# Patient Record
Sex: FEMALE | Race: WHITE | NOT HISPANIC OR LATINO | Employment: OTHER | URBAN - METROPOLITAN AREA
[De-identification: names, ages, dates, MRNs, and addresses within clinical notes are randomized per-mention and may not be internally consistent; named-entity substitution may affect disease eponyms.]

---

## 2017-12-12 NOTE — H&P
[facility]  TOTAL EYE CARE  [address]  [facility] [address]  [phone]  Dept: [phone]    OPHTHALMOLOGY PRE-OPERATIVE  HISTORY AND PHYSICAL    DATE OF H/P:  2017    DATE OF SURGERY:  2017  PROCEDURE:  Procedure(s):  PHACOEMULSIFICATION WITH STANDARD INTRAOCULAR LENS IMPLANT, Left Eye  LENS IMPLANT:  ZCB00 +24.0  REFRACTIVE GOAL:  PL Sph  SURGEON:  James Melendez MD    ANESTHESIA:  TOPICAL / MAC    OR CASE REQUIREMENTS:    DEMOGRAPHICS:  Demographic Information on Vy Dooley:    Vy Dooley  Gender: female  : 1950  [address] [facility] [address]  [phone] (home)     Medical Record: [id number]  Social Security Number: [id number]  Pharmacy: [facility] [address] [place] [address]  Primary Care Provider: Yael Calvin    Parent's names are: Data Unavailable (mother) and Data Unavailable (father).    Insurance: Payor: MEDICARE / Plan: MEDICARE FOR HB SUPPLEMENT / Product Type: Medicare /     OCULAR HISTORY:  Cataracts.    HISTORIES:  Past Medical History:   Diagnosis Date     COPD (chronic obstructive pulmonary disease) (H)        Past Surgical History:   Procedure Laterality Date     COLONOSCOPY  2011    Procedure:COLONOSCOPY; Colonoscopy  ; Surgeon:JACOB SCHULTZ; Location:[facility]       Family History   Problem Relation Age of Onset     Colon Cancer Father      LUNG DISEASE Mother      DIABETES Sister      DIABETES Brother        Social History   Substance Use Topics     Smoking status: Former Smoker     Packs/day: 2.00     Types: Cigarettes     Quit date: 2015     Smokeless tobacco: Not on file     Alcohol use 0.0 oz/week     0 Standard drinks or equivalent per week      Comment: rare       MEDICATIONS:  No current facility-administered medications for this encounter.      Current Outpatient Prescriptions   Medication Sig     Ipratropium-Albuterol (COMBIVENT RESPIMAT)  MCG/ACT inhaler Inhale  1 puff into the lungs 4 times daily     ESCITALOPRAM OXALATE PO      order for DME Oxygen 2 Li/min  continous     albuterol (PROAIR HFA, PROVENTIL HFA, VENTOLIN HFA) 108 (90 BASE) MCG/ACT inhaler Inhale 2 puffs into the lungs every 6 hours     omeprazole (PRILOSEC) 40 MG capsule Take by mouth daily     Cholecalciferol (VITAMIN D3) 2000 UNITS TABS      Pregabalin (LYRICA PO) Take 150 mg by mouth daily     Zolpidem Tartrate (AMBIEN PO) Take 5 mg by mouth nightly as needed for sleep     ALBUTEROL IN Inhale  into the lungs.     Fluticasone-Salmeterol (ADVAIR DISKUS IN) Inhale  into the lungs.     ranitidine (ZANTAC) 150 MG tablet Take 150 mg by mouth 2 times daily.       ALLERGIES:     Allergies   Allergen Reactions     Erythromycin Shortness Of Breath     Amoxicillin-Pot Clavulanate Itching       PERTINENT SYSTEMS REVIEW:    1. No - Do you have a history of heart attack, stroke, stent, bypass or surgery on an artery in the head, neck, heart or legs?  2. No - Do you ever have any pain or discomfort in your chest?  3. No - Do you have a history of  Heart Failure?  4. Yes: COPD - home oxygen - Are you troubled by shortness of breath when walking: On the level, up a slight hill or at night?  5. No - Do you currently have a cold, bronchitis or other respiratory infection?  6. Yes: COPD, home oxygen - Do you have a cough, shortness of breath or wheezing?  7. No - Do you sometimes get pains in the calves of your legs when you walk?  8. No - Do you or anyone in your family have previous history of blood clots?  9. No - Do you or does anyone in your family have a serious bleeding problem such as prolonged bleeding following surgeries or cuts?  10. No - Have you ever had problems with anemia or been told to take iron pills?  11. No - Have you had any abnormal blood loss such as black, tarry or bloody stools, or abnormal vaginal bleeding?  12. No - Have you ever had a blood transfusion?  13. No - Have you or any of your relatives  ever had problems with anesthesia?  14. No - Do you have sleep apnea, excessive snoring or daytime drowsiness?  15. No - Do you have any prosthetic heart valves?  16. No - Do you have prosthetic joints?    EXAMINATION:  Vitals were reviewed                       Vison:  Va, right - 20/160; left - 20/160  HEENT:  Cataract, otherwise unremarkable.  LUNGS:  Clear  CV:  Regular rate and rhythm without murmur  ABD:  Soft and nontender  NEURO:  Alert and nonfocal    IMPRESSION:  Patient cleared for ophthalmic surgery.  Low risk with monitored, light sedation.  I have assessed the patient's DVT risk, and no additional orders necessary.    PLAN:  Procedure(s):  PHACOEMULSIFICATION WITH STANDARD INTRAOCULAR LENS IMPLANT, Left Eye      James Melendez MD

## 2017-12-15 ENCOUNTER — ANESTHESIA EVENT (OUTPATIENT)
Dept: SURGERY | Facility: CLINIC | Age: 67
End: 2017-12-15
Payer: MEDICARE

## 2017-12-15 ASSESSMENT — COPD QUESTIONNAIRES: COPD: 1

## 2017-12-15 ASSESSMENT — LIFESTYLE VARIABLES: TOBACCO_USE: 1

## 2017-12-15 NOTE — ANESTHESIA PREPROCEDURE EVALUATION
Anesthesia Evaluation     .             ROS/MED HX    ENT/Pulmonary:     (+)tobacco use, Past use asthma moderate COPD, , . .    Neurologic:       Cardiovascular:         METS/Exercise Tolerance:     Hematologic:         Musculoskeletal:         GI/Hepatic:     (+) GERD       Renal/Genitourinary:         Endo:         Psychiatric:     (+) psychiatric history depression      Infectious Disease:         Malignancy:         Other:                     Physical Exam  Normal systems: cardiovascular, pulmonary and dental    Airway   Mallampati: II  TM distance: >3 FB  Neck ROM: full    Dental     Cardiovascular       Pulmonary                     Anesthesia Plan      History & Physical Review  History and physical reviewed and following examination; no interval change.    ASA Status:  3 .    NPO Status:  > 6 hours    Plan for MAC Reason for MAC:  Deep or markedly invasive procedure (G8)         Postoperative Care      Consents  Anesthetic plan, risks, benefits and alternatives discussed with:  Patient..                          .

## 2017-12-18 ENCOUNTER — SURGERY (OUTPATIENT)
Age: 67
End: 2017-12-18

## 2017-12-18 ENCOUNTER — HOSPITAL ENCOUNTER (OUTPATIENT)
Facility: CLINIC | Age: 67
Discharge: HOME | End: 2017-12-18
Attending: OPHTHALMOLOGY | Admitting: OPHTHALMOLOGY
Payer: MEDICARE

## 2017-12-18 ENCOUNTER — ANESTHESIA (OUTPATIENT)
Dept: SURGERY | Facility: CLINIC | Age: 67
End: 2017-12-18
Payer: MEDICARE

## 2017-12-18 VITALS
HEIGHT: 70 IN | RESPIRATION RATE: 16 BRPM | HEART RATE: 100 BPM | OXYGEN SATURATION: 93 % | BODY MASS INDEX: 24.34 KG/M2 | DIASTOLIC BLOOD PRESSURE: 67 MMHG | TEMPERATURE: 98.2 F | WEIGHT: 170 LBS | SYSTOLIC BLOOD PRESSURE: 123 MMHG

## 2017-12-18 PROCEDURE — 25000125 ZZHC RX 250: Performed by: NURSE ANESTHETIST, CERTIFIED REGISTERED

## 2017-12-18 PROCEDURE — 25000125 ZZHC RX 250: Performed by: OPHTHALMOLOGY

## 2017-12-18 PROCEDURE — 37000012 ZZH ANESTHESIA CATARACT PACKAGE: Performed by: OPHTHALMOLOGY

## 2017-12-18 PROCEDURE — 25000128 H RX IP 250 OP 636: Performed by: NURSE ANESTHETIST, CERTIFIED REGISTERED

## 2017-12-18 PROCEDURE — 71000022 ZZH RECOVERY CATRACT PACKAGE: Performed by: OPHTHALMOLOGY

## 2017-12-18 PROCEDURE — 36000025 ZZH CATARACT SURGICAL PACKAGE: Performed by: OPHTHALMOLOGY

## 2017-12-18 PROCEDURE — 25000128 H RX IP 250 OP 636: Performed by: OPHTHALMOLOGY

## 2017-12-18 PROCEDURE — V2632 POST CHMBR INTRAOCULAR LENS: HCPCS | Performed by: OPHTHALMOLOGY

## 2017-12-18 DEVICE — EYE IMP IOL AMO PCL TECNIS ZCB00 24.0: Type: IMPLANTABLE DEVICE | Site: EYE | Status: FUNCTIONAL

## 2017-12-18 RX ORDER — CYCLOPENTOLATE HYDROCHLORIDE 10 MG/ML
1 SOLUTION/ DROPS OPHTHALMIC
Status: COMPLETED | OUTPATIENT
Start: 2017-12-18 | End: 2017-12-18

## 2017-12-18 RX ORDER — TROPICAMIDE 10 MG/ML
1 SOLUTION/ DROPS OPHTHALMIC
Status: COMPLETED | OUTPATIENT
Start: 2017-12-18 | End: 2017-12-18

## 2017-12-18 RX ORDER — SODIUM CHLORIDE, SODIUM LACTATE, POTASSIUM CHLORIDE, CALCIUM CHLORIDE 600; 310; 30; 20 MG/100ML; MG/100ML; MG/100ML; MG/100ML
INJECTION, SOLUTION INTRAVENOUS CONTINUOUS
Status: DISCONTINUED | OUTPATIENT
Start: 2017-12-18 | End: 2017-12-18 | Stop reason: HOSPADM

## 2017-12-18 RX ORDER — LIDOCAINE 40 MG/G
CREAM TOPICAL
Status: DISCONTINUED | OUTPATIENT
Start: 2017-12-18 | End: 2017-12-18 | Stop reason: HOSPADM

## 2017-12-18 RX ORDER — DIPHENOXYLATE HYDROCHLORIDE AND ATROPINE SULFATE 2.5; .025 MG/1; MG/1
1 TABLET ORAL
COMMUNITY
Start: 2016-11-03

## 2017-12-18 RX ORDER — PHENYLEPHRINE HYDROCHLORIDE 25 MG/ML
1 SOLUTION/ DROPS OPHTHALMIC
Status: COMPLETED | OUTPATIENT
Start: 2017-12-18 | End: 2017-12-18

## 2017-12-18 RX ADMIN — SODIUM CHLORIDE, POTASSIUM CHLORIDE, SODIUM LACTATE AND CALCIUM CHLORIDE: 600; 310; 30; 20 INJECTION, SOLUTION INTRAVENOUS at 12:53

## 2017-12-18 RX ADMIN — TROPICAMIDE 1 DROP: 10 SOLUTION/ DROPS OPHTHALMIC at 12:53

## 2017-12-18 RX ADMIN — TROPICAMIDE 1 DROP: 10 SOLUTION/ DROPS OPHTHALMIC at 13:03

## 2017-12-18 RX ADMIN — PHENYLEPHRINE HYDROCHLORIDE 1 DROP: 25 SOLUTION/ DROPS OPHTHALMIC at 12:47

## 2017-12-18 RX ADMIN — MIDAZOLAM 2 MG: 1 INJECTION INTRAMUSCULAR; INTRAVENOUS at 13:32

## 2017-12-18 RX ADMIN — SODIUM CHLORIDE, POTASSIUM CHLORIDE, SODIUM LACTATE AND CALCIUM CHLORIDE: 600; 310; 30; 20 INJECTION, SOLUTION INTRAVENOUS at 13:32

## 2017-12-18 RX ADMIN — PHENYLEPHRINE HYDROCHLORIDE 1 DROP: 25 SOLUTION/ DROPS OPHTHALMIC at 12:54

## 2017-12-18 RX ADMIN — CYCLOPENTOLATE HYDROCHLORIDE 1 DROP: 10 SOLUTION OPHTHALMIC at 13:03

## 2017-12-18 RX ADMIN — TROPICAMIDE 1 DROP: 10 SOLUTION/ DROPS OPHTHALMIC at 12:46

## 2017-12-18 RX ADMIN — EPINEPHRINE 0.8 ML: 1 INJECTION, SOLUTION INTRAMUSCULAR; SUBCUTANEOUS at 13:46

## 2017-12-18 RX ADMIN — CYCLOPENTOLATE HYDROCHLORIDE 1 DROP: 10 SOLUTION OPHTHALMIC at 12:53

## 2017-12-18 RX ADMIN — LIDOCAINE HYDROCHLORIDE 1 TUBE: 20 JELLY TOPICAL at 13:46

## 2017-12-18 RX ADMIN — LIDOCAINE HYDROCHLORIDE 1 ML: 10 INJECTION, SOLUTION EPIDURAL; INFILTRATION; INTRACAUDAL; PERINEURAL at 12:48

## 2017-12-18 RX ADMIN — CYCLOPENTOLATE HYDROCHLORIDE 1 DROP: 10 SOLUTION OPHTHALMIC at 12:46

## 2017-12-18 RX ADMIN — PHENYLEPHRINE HYDROCHLORIDE 1 DROP: 25 SOLUTION/ DROPS OPHTHALMIC at 13:04

## 2017-12-18 ASSESSMENT — COPD QUESTIONNAIRES: CAT_SEVERITY: MODERATE

## 2017-12-18 NOTE — ANESTHESIA CARE TRANSFER NOTE
Patient: Vy Dooley    Procedure(s):  Left Cataract Removal with Implant - Wound Class: I-Clean    Diagnosis: cataract  Diagnosis Additional Information: No value filed.    Anesthesia Type:   MAC     Note:    Patient transferred to:Phase II  Handoff Report: Identifed the Patient, Identified the Reponsible Provider, Reviewed the pertinent medical history, Discussed the surgical course, Reviewed Intra-OP anesthesia mangement and issues during anesthesia, Set expectations for post-procedure period and Allowed opportunity for questions and acknowledgement of understanding      Vitals: (Last set prior to Anesthesia Care Transfer)    CRNA VITALS  12/18/2017 1321 - 12/18/2017 1351      12/18/2017             Pulse: 90    SpO2: (!)  84 %                Electronically Signed By: Nasima Murray CRNA, APRN CRNA  December 18, 2017  1:51 PM

## 2017-12-18 NOTE — OP NOTE
CATARACT OPERATIVE NOTE    PATIENT: Vy Dooley  DATE OF SURGERY: 12/18/2017  PREOPERATIVE DIAGNOSIS:  Senile Nuclear Cataract, Left eye  POSTOPERATIVE DIAGNOSIS:  Senile Nuclear Cataract, Left eye  OPERATIVE PROCEDURE:  Phacoemulsification and placement of intraocular lens  SURGEON:  James Melendez MD  ANESTHESIA:  Topical / MAC  EBL:  None  SPECIMENS:  None  COMPLICATIONS:  None    PROCEDURE:  The patient was brought to the operating room at [facility].  The left eye was prepped and draped in the usual fashion for cataract surgery.  A wire lid speculum was inserted.  A super sharp blade was used to make a paracentesis at the 5 O'clock position.  The super sharp blade was used to make a partial thickness temporal groove, which was 3 mm in length.  0.8 mL of non-preserved epi-Shugarcaine was injected into the anterior chamber.  Viscoelastic was used to inflate the anterior chamber through a cannula.  A 2.5 mm microkeratome was used to make a temporal clear corneal incision in a two-plane fashion.  A cystotome needle and forceps were used to make a capsulorrhexis.  Hydrodissection and hydrodelineation were performed with Balance Salt Solution.  The lens was then phacoemulsified and removed without complications.  The cortical material was removed with bimanual irrigation and aspiration.  The capsular bag was filled with viscoelastic.  A posterior chamber intraocular lens, preselected and recorded, was folded and inserted into the capsular bag.  The viscoelastic was removed with the irrigation and aspiration tip.  Balanced Salt Solution was used to refill the anterior chamber.  The wounds were checked for water tightness and required no suture.  The wire lid speculum was removed.  The patient's left eye was cleaned and a drop of each post-operative drop was placed, followed by a beavers shield.  The patient tolerated the procedure well, and there were no complications.      James STEINBERG  MD Nora

## 2017-12-18 NOTE — ANESTHESIA POSTPROCEDURE EVALUATION
Patient: Vy Dooley    Procedure(s):  Left Cataract Removal with Implant - Wound Class: I-Clean    Diagnosis:cataract  Diagnosis Additional Information: No value filed.    Anesthesia Type:  MAC    Note:  Anesthesia Post Evaluation    Patient location during evaluation: Bedside  Patient participation: Able to fully participate in evaluation  Level of consciousness: awake and alert  Pain management: adequate  Airway patency: patent  Cardiovascular status: acceptable  Respiratory status: acceptable  Hydration status: acceptable  PONV: none     Anesthetic complications: None          Last vitals:  Vitals:    12/18/17 1227   BP: 112/70   Pulse: 100   Resp: 20   Temp: 36.8  C (98.2  F)   SpO2: 92%         Electronically Signed By: Nasima Murray CRNA, APRN CRNA  December 18, 2017  1:51 PM

## 2018-01-16 NOTE — H&P
[facility]  TOTAL EYE CARE  [address]  [facility] [address]  [phone]  Dept: [phone]    OPHTHALMOLOGY PRE-OPERATIVE  HISTORY AND PHYSICAL    DATE OF H/P:  2018    DATE OF SURGERY:  2018  PROCEDURE:  Procedure(s):  PHACOEMULSIFICATION WITH STANDARD INTRAOCULAR LENS IMPLANT, Right Eye  LENS IMPLANT:  ZCB00 +24.5  REFRACTIVE GOAL:  PL Sph  SURGEON:  James Melendez MD    ANESTHESIA:  TOPICAL / MAC    OR CASE REQUIREMENTS:    DEMOGRAPHICS:  Demographic Information on Vy Freirend:    Vy LUCAS Renlund  Gender: female  : 1950  [address] [place] [address]  [phone] (home)     Medical Record: [id number]  Social Security Number: [id number]  Pharmacy: [id number] [address]  Primary Care Provider: Yael Calvin    Parent's names are: Data Unavailable (mother) and Data Unavailable (father).    Insurance: Payor: MEDICARE / Plan: MEDICARE FOR HB SUPPLEMENT / Product Type: Medicare /     OCULAR HISTORY:  Cataracts, s/p IOL OS.    HISTORIES:  Past Medical History:   Diagnosis Date     COPD (chronic obstructive pulmonary disease) (H)        Past Surgical History:   Procedure Laterality Date     COLONOSCOPY  2011    Procedure:COLONOSCOPY; Colonoscopy  ; Surgeon:JACOB SCHULTZ; Location:[facility]     EYE SURGERY       PHACOEMULSIFICATION WITH STANDARD INTRAOCULAR LENS IMPLANT Left 2017    Procedure: PHACOEMULSIFICATION WITH STANDARD INTRAOCULAR LENS IMPLANT;  Left Cataract Removal with Implant;  Surgeon: James Melendez MD;  Location: [facility]       Family History   Problem Relation Age of Onset     Colon Cancer Father      LUNG DISEASE Mother      DIABETES Sister      DIABETES Brother        Social History   Substance Use Topics     Smoking status: Former Smoker     Packs/day: 2.00     Types: Cigarettes     Quit date: 2015     Smokeless tobacco: Not on file     Alcohol use 0.0 oz/week      0 Standard drinks or equivalent per week      Comment: rare       MEDICATIONS:  No current facility-administered medications for this encounter.      Current Outpatient Prescriptions   Medication Sig     Multiple Vitamin (MULTI-VITAMINS) TABS Take 1 tablet by mouth     DULoxetine HCl (CYMBALTA PO) Take 60 mg by mouth daily     GABAPENTIN PO Take 300 mg by mouth At Bedtime     Eszopiclone (LUNESTA PO) Take 3 mg by mouth nightly as needed for sleep     Fexofenadine HCl (MUCINEX ALLERGY PO)      order for DME Oxygen 2 Li/min  continous     albuterol (PROAIR HFA, PROVENTIL HFA, VENTOLIN HFA) 108 (90 BASE) MCG/ACT inhaler Inhale 2 puffs into the lungs every 6 hours     omeprazole (PRILOSEC) 40 MG capsule Take by mouth daily     Fluticasone-Salmeterol (ADVAIR DISKUS IN) Inhale  into the lungs.     ranitidine (ZANTAC) 150 MG tablet Take 150 mg by mouth 2 times daily.       ALLERGIES:     Allergies   Allergen Reactions     Erythromycin Shortness Of Breath     Amoxicillin-Pot Clavulanate Itching       PERTINENT SYSTEMS REVIEW:    1. No - Do you have a history of heart attack, stroke, stent, bypass or surgery on an artery in the head, neck, heart or legs?  2. No - Do you ever have any pain or discomfort in your chest?  3. No - Do you have a history of  Heart Failure?  4. Yes: COPD, home oxygen - Are you troubled by shortness of breath when walking: On the level, up a slight hill or at night?  5. No - Do you currently have a cold, bronchitis or other respiratory infection?  6. Yes: COPD - Do you have a cough, shortness of breath or wheezing?  7. No - Do you sometimes get pains in the calves of your legs when you walk?  8. No - Do you or anyone in your family have previous history of blood clots?  9. No - Do you or does anyone in your family have a serious bleeding problem such as prolonged bleeding following surgeries or cuts?  10. No - Have you ever had problems with anemia or been told to take iron pills?  11. No - Have you  had any abnormal blood loss such as black, tarry or bloody stools, or abnormal vaginal bleeding?  12. No - Have you ever had a blood transfusion?  13. No - Have you or any of your relatives ever had problems with anesthesia?  14. Yes: daytime drowsiness - Do you have sleep apnea, excessive snoring or daytime drowsiness?  15. No - Do you have any prosthetic heart valves?  16. No - Do you have prosthetic joints?    EXAMINATION:  Vitals were reviewed                       Vison:  Va, right - 20/160;  HEENT:  Cataract, otherwise unremarkable.  LUNGS:  Clear  CV:  Regular rate and rhythm without murmur  ABD:  Soft and nontender  NEURO:  Alert and nonfocal    IMPRESSION:  Patient cleared for ophthalmic surgery.  Low risk with monitored, light sedation.  I have assessed the patient's DVT risk, and no additional orders necessary.    PLAN:  Procedure(s):  PHACOEMULSIFICATION WITH STANDARD INTRAOCULAR LENS IMPLANT, Right Eye      James Melendez MD

## 2018-01-18 ENCOUNTER — ANESTHESIA EVENT (OUTPATIENT)
Dept: SURGERY | Facility: CLINIC | Age: 68
End: 2018-01-18
Payer: MEDICARE

## 2018-01-18 ASSESSMENT — LIFESTYLE VARIABLES: TOBACCO_USE: 1

## 2018-01-18 ASSESSMENT — COPD QUESTIONNAIRES: COPD: 1

## 2018-01-18 NOTE — ANESTHESIA PREPROCEDURE EVALUATION
Anesthesia Evaluation     . Pt has had prior anesthetic. Type: MAC and General           ROS/MED HX    ENT/Pulmonary:     (+)tobacco use, Past use Mild Persistent asthma Treatment: Inhaler daily and Inhaler prn,  moderate COPD, O2 dependent, during Both 3-4 liters/min,  , . .    Neurologic:  - neg neurologic ROS     Cardiovascular:  - neg cardiovascular ROS       METS/Exercise Tolerance:     Hematologic:  - neg hematologic  ROS       Musculoskeletal:   (+) , , other musculoskeletal- Osteopenia      GI/Hepatic:     (+) GERD       Renal/Genitourinary:  - ROS Renal section negative       Endo:     (+) thyroid problem  Thyroid disease - Other Thyroid nodule, .      Psychiatric:  - neg psychiatric ROS       Infectious Disease:  - neg infectious disease ROS       Malignancy:      - no malignancy   Other:    - neg other ROS                 Physical Exam  Normal systems: cardiovascular, pulmonary and dental    Airway   Mallampati: I  TM distance: >3 FB  Neck ROM: full    Dental     Cardiovascular       Pulmonary                     Anesthesia Plan      History & Physical Review  History and physical reviewed and following examination; no interval change.    ASA Status:  3 .    NPO Status:  > 8 hours    Plan for MAC Reason for MAC:  Deep or markedly invasive procedure (G8)  PONV prophylaxis:  Ondansetron (or other 5HT-3) and Dexamethasone or Solumedrol       Postoperative Care  Postoperative pain management:  IV analgesics, Oral pain medications and Multi-modal analgesia.      Consents  Anesthetic plan, risks, benefits and alternatives discussed with:  Patient..                          .

## 2018-01-22 ENCOUNTER — HOSPITAL ENCOUNTER (OUTPATIENT)
Facility: CLINIC | Age: 68
Discharge: HOME | End: 2018-01-22
Attending: OPHTHALMOLOGY | Admitting: OPHTHALMOLOGY
Payer: MEDICARE

## 2018-01-22 ENCOUNTER — ANESTHESIA (OUTPATIENT)
Dept: SURGERY | Facility: CLINIC | Age: 68
End: 2018-01-22
Payer: MEDICARE

## 2018-01-22 ENCOUNTER — SURGERY (OUTPATIENT)
Age: 68
End: 2018-01-22

## 2018-01-22 VITALS
DIASTOLIC BLOOD PRESSURE: 75 MMHG | RESPIRATION RATE: 16 BRPM | HEIGHT: 69 IN | BODY MASS INDEX: 25.18 KG/M2 | SYSTOLIC BLOOD PRESSURE: 121 MMHG | WEIGHT: 170 LBS | TEMPERATURE: 96.8 F | HEART RATE: 112 BPM | OXYGEN SATURATION: 92 %

## 2018-01-22 PROCEDURE — 37000012 ZZH ANESTHESIA CATARACT PACKAGE: Performed by: OPHTHALMOLOGY

## 2018-01-22 PROCEDURE — 25000125 ZZHC RX 250: Performed by: NURSE ANESTHETIST, CERTIFIED REGISTERED

## 2018-01-22 PROCEDURE — 25000128 H RX IP 250 OP 636: Performed by: OPHTHALMOLOGY

## 2018-01-22 PROCEDURE — 25000125 ZZHC RX 250: Performed by: OPHTHALMOLOGY

## 2018-01-22 PROCEDURE — 36000025 ZZH CATARACT SURGICAL PACKAGE: Performed by: OPHTHALMOLOGY

## 2018-01-22 PROCEDURE — 71000022 ZZH RECOVERY CATRACT PACKAGE: Performed by: OPHTHALMOLOGY

## 2018-01-22 PROCEDURE — V2632 POST CHMBR INTRAOCULAR LENS: HCPCS | Performed by: OPHTHALMOLOGY

## 2018-01-22 PROCEDURE — 25000128 H RX IP 250 OP 636: Performed by: NURSE ANESTHETIST, CERTIFIED REGISTERED

## 2018-01-22 DEVICE — EYE IMP IOL AMO PCL TECNIS ZCB00 24.5: Type: IMPLANTABLE DEVICE | Site: POSTERIOR CHAMBER | Status: FUNCTIONAL

## 2018-01-22 RX ORDER — BALANCED SALT SOLUTION 6.4; .75; .48; .3; 3.9; 1.7 MG/ML; MG/ML; MG/ML; MG/ML; MG/ML; MG/ML
SOLUTION OPHTHALMIC PRN
Status: DISCONTINUED | OUTPATIENT
Start: 2018-01-22 | End: 2018-01-22 | Stop reason: HOSPADM

## 2018-01-22 RX ORDER — PROPARACAINE HYDROCHLORIDE 5 MG/ML
SOLUTION/ DROPS OPHTHALMIC PRN
Status: DISCONTINUED | OUTPATIENT
Start: 2018-01-22 | End: 2018-01-22 | Stop reason: HOSPADM

## 2018-01-22 RX ORDER — CYCLOPENTOLATE HYDROCHLORIDE 10 MG/ML
1 SOLUTION/ DROPS OPHTHALMIC
Status: COMPLETED | OUTPATIENT
Start: 2018-01-22 | End: 2018-01-22

## 2018-01-22 RX ORDER — PHENYLEPHRINE HYDROCHLORIDE 25 MG/ML
1 SOLUTION/ DROPS OPHTHALMIC
Status: COMPLETED | OUTPATIENT
Start: 2018-01-22 | End: 2018-01-22

## 2018-01-22 RX ORDER — SODIUM CHLORIDE, SODIUM LACTATE, POTASSIUM CHLORIDE, CALCIUM CHLORIDE 600; 310; 30; 20 MG/100ML; MG/100ML; MG/100ML; MG/100ML
INJECTION, SOLUTION INTRAVENOUS CONTINUOUS
Status: DISCONTINUED | OUTPATIENT
Start: 2018-01-22 | End: 2018-01-22 | Stop reason: HOSPADM

## 2018-01-22 RX ORDER — TROPICAMIDE 10 MG/ML
1 SOLUTION/ DROPS OPHTHALMIC
Status: COMPLETED | OUTPATIENT
Start: 2018-01-22 | End: 2018-01-22

## 2018-01-22 RX ORDER — LIDOCAINE 40 MG/G
CREAM TOPICAL
Status: DISCONTINUED | OUTPATIENT
Start: 2018-01-22 | End: 2018-01-22 | Stop reason: HOSPADM

## 2018-01-22 RX ADMIN — PHENYLEPHRINE HYDROCHLORIDE 1 DROP: 25 SOLUTION/ DROPS OPHTHALMIC at 09:52

## 2018-01-22 RX ADMIN — PHENYLEPHRINE HYDROCHLORIDE 1 DROP: 25 SOLUTION/ DROPS OPHTHALMIC at 09:58

## 2018-01-22 RX ADMIN — LIDOCAINE HYDROCHLORIDE 1 ML: 10 INJECTION, SOLUTION EPIDURAL; INFILTRATION; INTRACAUDAL; PERINEURAL at 09:53

## 2018-01-22 RX ADMIN — BALANCED SALT SOLUTION 15 ML: 6.4; .75; .48; .3; 3.9; 1.7 SOLUTION OPHTHALMIC at 11:00

## 2018-01-22 RX ADMIN — EPINEPHRINE 0.7 ML: 1 INJECTION, SOLUTION INTRAMUSCULAR; SUBCUTANEOUS at 10:59

## 2018-01-22 RX ADMIN — TROPICAMIDE 1 DROP: 10 SOLUTION/ DROPS OPHTHALMIC at 09:58

## 2018-01-22 RX ADMIN — MOXIFLOXACIN HYDROCHLORIDE 0.7 ML: 5 SOLUTION/ DROPS OPHTHALMIC at 11:08

## 2018-01-22 RX ADMIN — PROPARACAINE HYDROCHLORIDE 2 DROP: 5 SOLUTION/ DROPS OPHTHALMIC at 10:51

## 2018-01-22 RX ADMIN — LIDOCAINE HYDROCHLORIDE 1 TUBE: 20 JELLY TOPICAL at 11:00

## 2018-01-22 RX ADMIN — Medication 1.4 ML GIVEN: at 11:01

## 2018-01-22 RX ADMIN — CYCLOPENTOLATE HYDROCHLORIDE 1 DROP: 10 SOLUTION/ DROPS OPHTHALMIC at 09:57

## 2018-01-22 RX ADMIN — TROPICAMIDE 1 DROP: 10 SOLUTION/ DROPS OPHTHALMIC at 09:53

## 2018-01-22 RX ADMIN — PHENYLEPHRINE HYDROCHLORIDE 1 DROP: 25 SOLUTION/ DROPS OPHTHALMIC at 10:06

## 2018-01-22 RX ADMIN — BALANCED SALT SOLUTION 250 ML: 6.4; .75; .48; .3; 3.9; 1.7 SOLUTION OPHTHALMIC at 11:00

## 2018-01-22 RX ADMIN — CYCLOPENTOLATE HYDROCHLORIDE 1 DROP: 10 SOLUTION/ DROPS OPHTHALMIC at 10:05

## 2018-01-22 RX ADMIN — CYCLOPENTOLATE HYDROCHLORIDE 1 DROP: 10 SOLUTION/ DROPS OPHTHALMIC at 09:52

## 2018-01-22 RX ADMIN — TROPICAMIDE 1 DROP: 10 SOLUTION/ DROPS OPHTHALMIC at 10:07

## 2018-01-22 RX ADMIN — MIDAZOLAM 2 MG: 1 INJECTION INTRAMUSCULAR; INTRAVENOUS at 10:52

## 2018-01-22 ASSESSMENT — COPD QUESTIONNAIRES: CAT_SEVERITY: MODERATE

## 2018-01-22 NOTE — ANESTHESIA POSTPROCEDURE EVALUATION
Patient: Vy Dooley    Procedure(s):  Right Cataract Removal with Implant - Wound Class: I-Clean    Diagnosis:cataract  Diagnosis Additional Information: No value filed.    Anesthesia Type:  MAC    Note:  Anesthesia Post Evaluation    Patient location during evaluation: Phase 2 and Bedside  Patient participation: Able to fully participate in evaluation  Level of consciousness: awake and alert  Pain management: adequate  Airway patency: patent  Cardiovascular status: acceptable and hemodynamically stable  Respiratory status: acceptable and room air  Hydration status: acceptable  PONV: none     Anesthetic complications: None          Last vitals:  Vitals:    01/22/18 0933   BP: 149/83   Pulse: 112   Resp: 18   Temp: 36.4  C (97.5  F)   SpO2: 95%         Electronically Signed By: SHEREE Campos CRNA  January 22, 2018  11:12 AM

## 2018-01-22 NOTE — ANESTHESIA CARE TRANSFER NOTE
Patient: Vy Dooley    Procedure(s):  Right Cataract Removal with Implant - Wound Class: I-Clean    Diagnosis: cataract  Diagnosis Additional Information: No value filed.    Anesthesia Type:   MAC     Note:  Airway :Room Air  Patient transferred to:Phase II  Handoff Report: Identifed the Patient, Identified the Reponsible Provider, Reviewed the pertinent medical history, Discussed the surgical course, Reviewed Intra-OP anesthesia mangement and issues during anesthesia, Set expectations for post-procedure period and Allowed opportunity for questions and acknowledgement of understanding      Vitals: (Last set prior to Anesthesia Care Transfer)    CRNA VITALS  1/22/2018 1042 - 1/22/2018 1112      1/22/2018             Pulse: 88    SpO2: 96 %                Electronically Signed By: SHEREE Campos CRNA  January 22, 2018  11:12 AM

## 2018-01-22 NOTE — OP NOTE
OPHTHALMOLOGY OPERATIVE NOTE    PATIENT: Vy Dooley  DATE OF SURGERY: 1/22/2018  PREOPERATIVE DIAGNOSIS:  Senile Nuclear Cataract, Right eye  POSTOPERATIVE DIAGNOSIS:  Senile Nuclear Cataract, Right eye  OPERATIVE PROCEDURE:  Phacoemulsification with placement of intraocular lens  SURGEON:  James Melendez MD  ANESTHESIA:  Topical / MAC  EBL:  None  SPECIMENS:  None  COMPLICATIONS:  None    PROCEDURE:  The patient was brought to the operating room at [facility].  The right eye was prepped and draped in the usual fashion for cataract surgery.  A wire lid speculum was inserted.  A super sharp blade was used to make a paracentesis at the 11 O'clock position.  The super sharp blade was used to make a partial thickness temporal groove, which was 3 mm in length.  0.8 mL of non-preserved epi-Shugarcaine was injected into the anterior chamber.  Viscoelastic was used to inflate the anterior chamber through a cannula.  A 2.5 mm microkeratome was used to make a temporal clear corneal incision in a two-plane fashion.  A cystotome needle and forceps were used to make a capsulorrhexis.  Hydrodissection and hydrodelineation were performed with Balance Salt Solution.  The lens was then phacoemulsified and removed without complications.  The cortical material was removed with bimanual irrigation and aspiration.  The capsular bag was filled with viscoelastic.  A posterior chamber intraocular lens, preselected and recorded, was folded and inserted into the capsular bag.  The viscoelastic was removed with the irrigation and aspiration tip.  Balanced Salt Solution with Vigamox was used to refill the anterior chamber.  The wounds were checked for water tightness and required no suture.  The wire lid speculum was removed.  The patient's right eye was cleaned and a drop of each post-operative drop was placed, followed by a beavers shield.  The patient tolerated the procedure well, and there were no  complications.      James Melendez MD

## 2019-06-18 ENCOUNTER — HOSPITAL ENCOUNTER (OUTPATIENT)
Dept: CARDIAC REHAB | Facility: CLINIC | Age: 69
End: 2019-06-18
Attending: INTERNAL MEDICINE
Payer: COMMERCIAL

## 2019-06-18 PROCEDURE — G0238 OTH RESP PROC, INDIV: HCPCS

## 2019-06-18 PROCEDURE — 40000244 ZZH STATISTIC VISIT PULM REHAB

## 2019-06-25 ENCOUNTER — HOSPITAL ENCOUNTER (OUTPATIENT)
Dept: CARDIAC REHAB | Facility: CLINIC | Age: 69
End: 2019-06-25
Attending: INTERNAL MEDICINE
Payer: COMMERCIAL

## 2019-06-25 PROCEDURE — G0239 OTH RESP PROC, GROUP: HCPCS

## 2019-06-25 PROCEDURE — 40000244 ZZH STATISTIC VISIT PULM REHAB

## 2019-06-27 ENCOUNTER — HOSPITAL ENCOUNTER (OUTPATIENT)
Dept: CARDIAC REHAB | Facility: CLINIC | Age: 69
End: 2019-06-27
Attending: INTERNAL MEDICINE
Payer: COMMERCIAL

## 2019-06-27 PROCEDURE — 40000244 ZZH STATISTIC VISIT PULM REHAB

## 2019-06-27 PROCEDURE — G0239 OTH RESP PROC, GROUP: HCPCS

## 2019-07-30 ENCOUNTER — TELEPHONE (OUTPATIENT)
Dept: CARDIAC REHAB | Facility: CLINIC | Age: 69
End: 2019-07-30

## 2019-08-13 NOTE — PROGRESS NOTES
Respiratory Therapy Discharge Summary    Reason for discharge:    Patient/family request discontinuation of services.    Progress towards goals:  Goals not met.  Barriers to achieving goals:   discharge from facility. Pt attended 3 sessions and felt she was too weak to continue the program at this time.     Recommendation(s):    Continue home exercise program.

## 2019-08-13 NOTE — ADDENDUM NOTE
Encounter addended by: Mignon Villavicencio EP on: 8/13/2019 2:46 PM   Actions taken: Episode resolved, Sign clinical note

## 2020-12-03 ENCOUNTER — RECORDS - HEALTHEAST (OUTPATIENT)
Dept: ADMINISTRATIVE | Facility: OTHER | Age: 70
End: 2020-12-03

## 2020-12-03 ENCOUNTER — AMBULATORY - HEALTHEAST (OUTPATIENT)
Dept: SURGERY | Facility: HOSPITAL | Age: 70
End: 2020-12-03

## 2020-12-03 DIAGNOSIS — Z11.59 ENCOUNTER FOR SCREENING FOR OTHER VIRAL DISEASES: ICD-10-CM

## 2022-01-04 ENCOUNTER — APPOINTMENT (OUTPATIENT)
Dept: GENERAL RADIOLOGY | Facility: CLINIC | Age: 72
End: 2022-01-04
Attending: FAMILY MEDICINE
Payer: COMMERCIAL

## 2022-01-04 ENCOUNTER — HOSPITAL ENCOUNTER (EMERGENCY)
Facility: CLINIC | Age: 72
Discharge: HOME | End: 2022-01-04
Attending: FAMILY MEDICINE | Admitting: FAMILY MEDICINE
Payer: COMMERCIAL

## 2022-01-04 ENCOUNTER — NURSE TRIAGE (OUTPATIENT)
Dept: NURSING | Facility: CLINIC | Age: 72
End: 2022-01-04
Payer: COMMERCIAL

## 2022-01-04 VITALS
RESPIRATION RATE: 20 BRPM | HEART RATE: 73 BPM | DIASTOLIC BLOOD PRESSURE: 51 MMHG | SYSTOLIC BLOOD PRESSURE: 103 MMHG | OXYGEN SATURATION: 98 %

## 2022-01-04 DIAGNOSIS — J96.01 ACUTE RESPIRATORY FAILURE WITH HYPOXIA (H): ICD-10-CM

## 2022-01-04 DIAGNOSIS — J12.82 PNEUMONIA DUE TO 2019 NOVEL CORONAVIRUS: ICD-10-CM

## 2022-01-04 DIAGNOSIS — U07.1 PNEUMONIA DUE TO 2019 NOVEL CORONAVIRUS: ICD-10-CM

## 2022-01-04 LAB
ALBUMIN SERPL-MCNC: 2.9 G/DL (ref 3.4–5)
ALP SERPL-CCNC: 54 U/L (ref 40–150)
ALT SERPL W P-5'-P-CCNC: 30 U/L (ref 0–50)
ANION GAP SERPL CALCULATED.3IONS-SCNC: 8 MMOL/L (ref 3–14)
AST SERPL W P-5'-P-CCNC: 32 U/L (ref 0–45)
BASE EXCESS BLDV CALC-SCNC: 0.9 MMOL/L (ref -7.7–1.9)
BASOPHILS # BLD AUTO: 0 10E3/UL (ref 0–0.2)
BASOPHILS NFR BLD AUTO: 0 %
BILIRUB SERPL-MCNC: 0.3 MG/DL (ref 0.2–1.3)
BUN SERPL-MCNC: 18 MG/DL (ref 7–30)
CALCIUM SERPL-MCNC: 8.2 MG/DL (ref 8.5–10.1)
CHLORIDE BLD-SCNC: 109 MMOL/L (ref 94–109)
CO2 SERPL-SCNC: 25 MMOL/L (ref 20–32)
CREAT SERPL-MCNC: 1.23 MG/DL (ref 0.52–1.04)
CRP SERPL-MCNC: 56.4 MG/L (ref 0–8)
EOSINOPHIL # BLD AUTO: 0 10E3/UL (ref 0–0.7)
EOSINOPHIL NFR BLD AUTO: 1 %
ERYTHROCYTE [DISTWIDTH] IN BLOOD BY AUTOMATED COUNT: 12.4 % (ref 10–15)
FLUAV RNA SPEC QL NAA+PROBE: NEGATIVE
FLUBV RNA RESP QL NAA+PROBE: NEGATIVE
GFR SERPL CREATININE-BSD FRML MDRD: 47 ML/MIN/1.73M2
GLUCOSE BLD-MCNC: 110 MG/DL (ref 70–99)
HCO3 BLDV-SCNC: 28 MMOL/L (ref 21–28)
HCT VFR BLD AUTO: 40.7 % (ref 35–47)
HGB BLD-MCNC: 13 G/DL (ref 11.7–15.7)
IMM GRANULOCYTES # BLD: 0 10E3/UL
IMM GRANULOCYTES NFR BLD: 0 %
LACTATE SERPL-SCNC: 1 MMOL/L (ref 0.7–2)
LYMPHOCYTES # BLD AUTO: 1.3 10E3/UL (ref 0.8–5.3)
LYMPHOCYTES NFR BLD AUTO: 33 %
MAGNESIUM SERPL-MCNC: 1.9 MG/DL (ref 1.6–2.3)
MCH RBC QN AUTO: 30.8 PG (ref 26.5–33)
MCHC RBC AUTO-ENTMCNC: 31.9 G/DL (ref 31.5–36.5)
MCV RBC AUTO: 96 FL (ref 78–100)
MONOCYTES # BLD AUTO: 0.3 10E3/UL (ref 0–1.3)
MONOCYTES NFR BLD AUTO: 9 %
NEUTROPHILS # BLD AUTO: 2.2 10E3/UL (ref 1.6–8.3)
NEUTROPHILS NFR BLD AUTO: 57 %
NRBC # BLD AUTO: 0 10E3/UL
NRBC BLD AUTO-RTO: 0 /100
O2/TOTAL GAS SETTING VFR VENT: 30 %
PCO2 BLDV: 57 MM HG (ref 40–50)
PH BLDV: 7.31 [PH] (ref 7.32–7.43)
PLATELET # BLD AUTO: 148 10E3/UL (ref 150–450)
PO2 BLDV: 25 MM HG (ref 25–47)
POTASSIUM BLD-SCNC: 3.5 MMOL/L (ref 3.4–5.3)
PROCALCITONIN SERPL-MCNC: 0.05 NG/ML
PROT SERPL-MCNC: 6.7 G/DL (ref 6.8–8.8)
RBC # BLD AUTO: 4.22 10E6/UL (ref 3.8–5.2)
SARS-COV-2 RNA RESP QL NAA+PROBE: POSITIVE
SODIUM SERPL-SCNC: 142 MMOL/L (ref 133–144)
TROPONIN I SERPL HS-MCNC: 20 NG/L
WBC # BLD AUTO: 3.8 10E3/UL (ref 4–11)

## 2022-01-04 PROCEDURE — 99284 EMERGENCY DEPT VISIT MOD MDM: CPT | Performed by: FAMILY MEDICINE

## 2022-01-04 PROCEDURE — 36415 COLL VENOUS BLD VENIPUNCTURE: CPT | Performed by: FAMILY MEDICINE

## 2022-01-04 PROCEDURE — 83735 ASSAY OF MAGNESIUM: CPT | Performed by: FAMILY MEDICINE

## 2022-01-04 PROCEDURE — 84460 ALANINE AMINO (ALT) (SGPT): CPT | Performed by: FAMILY MEDICINE

## 2022-01-04 PROCEDURE — 84484 ASSAY OF TROPONIN QUANT: CPT | Performed by: FAMILY MEDICINE

## 2022-01-04 PROCEDURE — 96360 HYDRATION IV INFUSION INIT: CPT | Performed by: FAMILY MEDICINE

## 2022-01-04 PROCEDURE — 85025 COMPLETE CBC W/AUTO DIFF WBC: CPT | Performed by: FAMILY MEDICINE

## 2022-01-04 PROCEDURE — 86140 C-REACTIVE PROTEIN: CPT | Performed by: FAMILY MEDICINE

## 2022-01-04 PROCEDURE — 99284 EMERGENCY DEPT VISIT MOD MDM: CPT | Mod: 25 | Performed by: FAMILY MEDICINE

## 2022-01-04 PROCEDURE — 87636 SARSCOV2 & INF A&B AMP PRB: CPT | Performed by: FAMILY MEDICINE

## 2022-01-04 PROCEDURE — 82803 BLOOD GASES ANY COMBINATION: CPT | Performed by: FAMILY MEDICINE

## 2022-01-04 PROCEDURE — 71045 X-RAY EXAM CHEST 1 VIEW: CPT

## 2022-01-04 PROCEDURE — 258N000003 HC RX IP 258 OP 636: Performed by: FAMILY MEDICINE

## 2022-01-04 PROCEDURE — 84145 PROCALCITONIN (PCT): CPT | Performed by: FAMILY MEDICINE

## 2022-01-04 PROCEDURE — 250N000009 HC RX 250: Performed by: FAMILY MEDICINE

## 2022-01-04 PROCEDURE — C9803 HOPD COVID-19 SPEC COLLECT: HCPCS | Performed by: FAMILY MEDICINE

## 2022-01-04 PROCEDURE — 80053 COMPREHEN METABOLIC PANEL: CPT | Performed by: FAMILY MEDICINE

## 2022-01-04 PROCEDURE — 83605 ASSAY OF LACTIC ACID: CPT | Performed by: FAMILY MEDICINE

## 2022-01-04 RX ORDER — DEXAMETHASONE SODIUM PHOSPHATE 4 MG/ML
10 VIAL (ML) INJECTION ONCE
Status: COMPLETED | OUTPATIENT
Start: 2022-01-04 | End: 2022-01-04

## 2022-01-04 RX ORDER — DEXAMETHASONE 6 MG/1
6 TABLET ORAL
Qty: 5 TABLET | Refills: 0 | Status: SHIPPED | OUTPATIENT
Start: 2022-01-04 | End: 2024-09-26

## 2022-01-04 RX ADMIN — SODIUM CHLORIDE 1000 ML: 9 INJECTION, SOLUTION INTRAVENOUS at 20:12

## 2022-01-04 RX ADMIN — DEXAMETHASONE SODIUM PHOSPHATE 10 MG: 4 INJECTION, SOLUTION INTRA-ARTICULAR; INTRALESIONAL; INTRAMUSCULAR; INTRAVENOUS; SOFT TISSUE at 21:46

## 2022-01-04 NOTE — TELEPHONE ENCOUNTER
Has had a severe cough and other virus symptoms 1 week ago     Son has virus symptoms  -covid positive x2    Confused   Coughing fits   Very short of breath   Not eating or drinking   Chills  Bedwetting  Fatigue   Sore throat    History of COPD    Triaged to a disposition of Call . Patient and son are agreeable.     COVID 19 Nurse Triage Plan/Patient Instructions    Please be aware that novel coronavirus (COVID-19) may be circulating in the community. If you develop symptoms such as fever, cough, or SOB or if you have concerns about the presence of another infection including coronavirus (COVID-19), please contact your health care provider or visit [email or web address].     Disposition/Instructions    Call to EMS/911 recommended. Follow protocol based instructions.     Bring Your Own Device:  Please also bring your smart device(s) (smart phones, tablets, laptops) and their charging cables for your personal use and to communicate with your care team during your visit.    Thank you for taking steps to prevent the spread of this virus.  o Limit your contact with others.  o Wear a simple mask to cover your cough.  o Wash your hands well and often.    Resources    [facility]: About COVID-19: [email or web address]    CDC: What to Do If You're Sick: www.cdc.gov/coronavirus/2019-ncov/about/steps-when-sick.html    CDC: Ending Home Isolation: www.cdc.gov/coronavirus/2019-ncov/hcp/disposition-in-home-patients.html     CDC: Caring for Someone: www.cdc.gov/coronavirus/2019-ncov/if-you-are-sick/care-for-someone.html     [facility]: Interim Guidance for Hospital Discharge to Home: www.health.[facility].mn.us/diseases/coronavirus/hcp/hospdischarge.pdf    [facility] clinical trials (COVID-19 research studies): [email or web address]     Below are the COVID-19 hotlines at the [place] Department of Health ([facility]). Interpreters are available.   o For health questions: Call  210.483.7447 or 1-874.815.7433 (7 a.m. to 7 p.m.)  o For questions about schools and childcare: Call 122-157-7251 or 1-170.246.4637 (7 a.m. to 7 p.m.)     Reason for Disposition    SEVERE difficulty breathing (e.g., struggling for each breath, speaks in single words)    Difficult to awaken or acting confused (e.g., disoriented, slurred speech)    Protocols used: CORONAVIRUS (COVID-19) DIAGNOSED OR [id number] 8.25.2021    Keke Velasco RN on 1/4/2022 at 3:29 PM

## 2022-01-05 ENCOUNTER — PATIENT OUTREACH (OUTPATIENT)
Dept: CARE COORDINATION | Facility: CLINIC | Age: 72
End: 2022-01-05
Payer: COMMERCIAL

## 2022-01-05 NOTE — PROGRESS NOTES
Clinic Care Coordination Contact    Care Coordination ED Discharge Follow up Note    Patient referred for Virtual Home Monitoring Program for COVID-19.     Called and left message for patient encouraging them to schedule virtual visit with PCP and to watch for invitation from [employer]. Phone number to reach [facility] primary scheduling and Nurse Advisor line reviewed on message.      Amanda Bergman RN  [facility]  - Clinic Care Coordinator

## 2022-01-05 NOTE — DISCHARGE INSTRUCTIONS
"Return to the Emergency Room if the following occurs:     Worsened breathing, dehydration, or for any concern at anytime.    Medications:  Decadron.  First dose was given in the ED tonight.  Start the prescription tomorrow.  Ibuprofen / tylenol alternating every three hours for comfort, as needed.    Follow up:  Consider calling the [facility] to discuss available medications like ivermectin and hydroxychloroquine, etc.  See phone number and website below.   [phone]   [email or web address]  2. Consider calling the [facility] to discuss other treatment options.  See the web site below for contact information and education.   [email or web address]    Discharge Instructions for COVID-19 Patients  You have--or may have--COVID-19. Please follow the instructions listed below.   If you have a weakened immune system, discuss with your doctor any other actions you need to take.  How can I protect others?  If you have symptoms (fever, cough, body aches or trouble breathing):  Stay home and away from others (self-isolate) until:  At least 10 days have passed since your symptoms started, And   You've had no fever--and no medicine that reduces fever--for 1 full day (24 hours), And    Your other symptoms have resolved (gotten better).  If you don't show symptoms, but testing showed that you have COVID-19:  Stay home and away from others (self-isolate). Follow the tips under \"How do I self-isolate?\" below for 10 days (20 days if you have a weak immune system).  You don't need to be retested for COVID-19 before going back to school or work. As long as you're fever-free and feeling better, you can go back to school, work and other activities after waiting the 10 or 20 days.   How do I self-isolate?  Stay in your own room, even for meals. Use your own bathroom if you can.  Stay away from others in your home. No hugging, kissing or shaking hands. No " "visitors.  Don't go to work, school or anywhere else.  Clean \"high touch\" surfaces often (doorknobs, counters, handles). Use household cleaning spray or wipes. You'll find a full list of  on the EPA website: www.epa.gov/pesticide-registration/list-n-disinfectants-use-against-sars-cov-2.  Cover your mouth and nose with a mask or other face covering to avoid spreading germs.  Wash your hands and face often. Use soap and water.  Caregivers in these groups are at risk for severe illness due to COVID-19:  People 65 years and older  People who live in a nursing home or long-term care facility  People with chronic disease (lung, heart, cancer, diabetes, kidney, liver, immunologic)  People who have a weakened immune system, including those who:  Are in cancer treatment  Take medicine that weakens the immune system, such as corticosteroids  Had a bone marrow or organ transplant  Have an immune deficiency  Have poorly controlled HIV or AIDS  Are obese (body mass index of 40 or higher)  Smoke regularly  Caregivers should wear gloves while washing dishes, handling laundry and cleaning bedrooms and bathrooms.  Use caution when washing and drying laundry: Don't shake dirty laundry and use the warmest water setting that you can.  For more tips on managing your health at home, go to www.cdc.gov/coronavirus/2019-ncov/downloads/10Things.pdf.  How can I take care of myself at home?  Get lots of rest. Drink extra fluids (unless a doctor has told you not to).    Take Tylenol (acetaminophen) for fever or pain. If you have liver or kidney problems, ask your family doctor if it's okay to take Tylenol.     Adults can take either:  650 mg (two 325 mg pills) every 4 to 6 hours, or   1,000 mg (two 500 mg pills) every 8 hours as needed.  Note: Don't take more than 3,000 mg in one day. Acetaminophen is found in many medicines (both prescribed and over-the-counter medicines). Read all labels to be sure you don't take too much.   For " children, check the Tylenol bottle for the right dose. The dose is based on the child's age or weight.  If you have other health problems (like cancer, heart failure, an organ transplant or severe kidney disease): Call your specialty clinic if you don't feel better in the next 2 days.    Know when to call 911. Emergency warning signs include:  Trouble breathing or shortness of breath  Pain or pressure in the chest that doesn't go away  Feeling confused like you haven't felt before, or not being able to wake up  Bluish-colored lips or face    Your doctor may have prescribed a blood thinner medicine. Follow their instructions.  Where can I get more information?  [facility] - About COVID-19: [email or web address]  CDC - What to Do If You're Sick: www.cdc.gov/coronavirus/2019-ncov/about/steps-when-sick.html  [facility] - Ending Home Isolation: www.cdc.gov/coronavirus/2019-ncov/hcp/disposition-in-home-patients.html  CDC - Caring for Someone: www.cdc.gov/coronavirus/2019-ncov/if-you-are-sick/care-for-someone.html  [facility] - Interim Guidance for Hospital Discharge to Home: www.health.[facility].mn.us/diseases/coronavirus/hcp/hospdischarge.pdf  [facility] clinical trials (COVID-19 research studies): [email or web address].[facility]-clinical-trials  Below are the COVID-19 hotlines at the [place] Department of Health ([facility]). Interpreters are available.  For health questions: Call 853-165-2361 or 1-221.619.8472 (7 a.m. to 7 p.m.)  For questions about schools and childcare: Call 741-509-9843 or 1-170.881.7787 (7 a.m. to 7 p.m.)    For informational purposes only. Not to replace the advice of your health care provider. Clinically reviewed by the Infection Prevention Team. Copyright   2020 [employer]. All rights reserved. [employer] 610236 - REV 08/04/20.

## 2022-01-05 NOTE — ED TRIAGE NOTES
This RN received a call from the patient's sister alivia. She wanted to inform us that the patient is confused and has been incontinent over the past 2 weeks. The patient refuses to wear her oxygen at home.

## 2022-01-05 NOTE — ED PROVIDER NOTES
"  HPI   The patient is a 71-year-old female presenting by private car with her son for a cough and shortness of breath.  She did receive 2 Covid vaccinations, the last being in March, 2021.  She has not received her booster.  She did not receive the influenza vaccine.  She does have COPD and is supposed to be oxygen dependent, 3 L during the day and 2 L at night but she tells me that she does not use it anymore.  She stopped smoking 5 years ago.  She does not take her COPD medication on a regular basis.  She says that she stopped using most of her medicine over the past couple of weeks.  She does report an increased cough with production.  No chest pain.  She does feel more short of breath.  She does acknowledge that she does not feel herself.  No headache.  No sore throat.  No myalgia reported.  No nausea or vomiting.  She does report a couple of bouts of diarrhea.  No hematochezia or melena reported.  No dysuria but she does report increased urinary frequency.  No recent falls or injury.  No skin rash.  Decreased appetite.  I did speak with the patient's son after receiving approval from the patient.        Allergies:  Allergies   Allergen Reactions     Erythromycin Shortness Of Breath     Amoxicillin-Pot Clavulanate Itching     Problem List:    Patient Active Problem List    Diagnosis Date Noted     Chronic obstructive pulmonary disease (H) 12/09/2015     Priority: Medium     Overview:   Last PFTs 2002; pt has declined further PFTs b/c difficult for her to be off of inhalers for 8-10hrs as they require.         Gastroesophageal reflux disease 12/09/2015     Priority: Medium     Restless legs syndrome 12/09/2015     Priority: Medium     Osteopenia 01/22/2015     Priority: Medium     Overview:   DEXA 10/8/13 -> \"repeat in 2yrs\"       Vitamin D deficiency 10/09/2013     Priority: Medium     Family history of colon cancer 10/08/2013     Priority: Medium     Overview:   Father  Nml colonoscopy 9/2011 -> \"repeat in " "5yrs\"       Human papilloma virus (HPV) infection 10/01/2013     Priority: Medium     Overview:   Pt had [facility] bx in 1972. Pap 6/2012: ASCUS/+HPV. [place] 7/2012: \"normal. Repeat pap in 1yr\". Pap 10/2013: ASCUS/+HPV. [place] 11/2013: bx showed cervicitis. \"repeat pap in 1yr\". Pap 1/22/15: ASCUS/+HPV.  Colposcopy 2/3/15: HPV changes -> \"repeat pap and HPV in one year\"       Insomnia 06/05/2012     Priority: Medium     Overview:   Temazepam -> felt hungover  Seroquel -> \"wet the bed\"       Thyroid nodule 07/19/2010     Priority: Medium     Overview:   3mm right nodule -> repeat u/s in 1/2015 stable        Environmental allergies 06/02/2009     Priority: Medium     Nicotine dependence 10/15/2008     Priority: Medium     Impaired glucose tolerance 09/23/2008     Priority: Medium     Pure hyperglyceridemia 03/25/2008     Priority: Medium     Asthma 03/25/2008     Priority: Medium     Overview:   inactive icd-9 diagnosis auto replaced with icd-10, display name retained//mporz       Mild persistent asthma 03/25/2008     Priority: Medium     Overview:   PFTs 12/2015: Moderate Airflow Obstruction with reversibility and air-trapping       Dysthymic disorder 03/19/2008     Priority: Medium     Overview:   Lexapro, Paxil, Zoloft, Prozac, Wellbutrin, Effexor, Cymbalta and Pristiq without any benefit   -> referral to Psychiatry        Past Medical History:    Past Medical History:   Diagnosis Date     COPD (chronic obstructive pulmonary disease) (H)      Past Surgical History:    Past Surgical History:   Procedure Laterality Date     COLONOSCOPY  9/26/2011    Procedure:COLONOSCOPY; Colonoscopy  ; Surgeon:JACOB SCHULTZ; Location:[facility]     EYE SURGERY       PHACOEMULSIFICATION WITH STANDARD INTRAOCULAR LENS IMPLANT Left 12/18/2017    Procedure: PHACOEMULSIFICATION WITH STANDARD INTRAOCULAR LENS IMPLANT;  Left Cataract Removal with Implant;  Surgeon: James Melendez MD;  Location: [facility]     PHACOEMULSIFICATION WITH STANDARD " INTRAOCULAR LENS IMPLANT Right 2018    Procedure: PHACOEMULSIFICATION WITH STANDARD INTRAOCULAR LENS IMPLANT;  Right Cataract Removal with Implant;  Surgeon: James Melendez MD;  Location: [facility]     Family History:    Family History   Problem Relation Age of Onset     Colon Cancer Father      LUNG DISEASE Mother      Diabetes Sister      Diabetes Brother      Social History:  Marital Status:  Single [1]  Social History     Tobacco Use     Smoking status: Former Smoker     Packs/day: 2.00     Types: Cigarettes     Quit date: 2015     Years since quittin.9     Smokeless tobacco: Not on file   Substance Use Topics     Alcohol use: Yes     Alcohol/week: 0.0 standard drinks     Comment: rare     Drug use: Not on file      Medications:    dexamethasone (DECADRON) 6 MG tablet  albuterol (PROAIR HFA, PROVENTIL HFA, VENTOLIN HFA) 108 (90 BASE) MCG/ACT inhaler  DULoxetine HCl (CYMBALTA PO)  Eszopiclone (LUNESTA PO)  Fexofenadine HCl (MUCINEX ALLERGY PO)  Fluticasone-Salmeterol (ADVAIR DISKUS IN)  GABAPENTIN PO  Multiple Vitamin (MULTI-VITAMINS) TABS  omeprazole (PRILOSEC) 40 MG capsule  order for DME  ranitidine (ZANTAC) 150 MG tablet      Review of Systems   All other systems reviewed and are negative.      PE   BP: 103/51  Pulse: 72  Resp: 18  SpO2: (!) 86 % (79 with ambulating from WC to chair.  Placed on oxygen 2LPM via NC with sats up to 93)  Physical Exam  Vitals reviewed.   Constitutional:       Appearance: She is well-developed.      Comments: Cooperative, smiling intermittently.  Answering questions well.  She does appear tired.  She does appear almost flippant with her answers and her being here.   HENT:      Head: Normocephalic and atraumatic.      Right Ear: External ear normal.      Left Ear: External ear normal.      Nose: Nose normal.      Mouth/Throat:      Mouth: Mucous membranes are moist.      Pharynx: Oropharynx is clear.   Eyes:      Extraocular Movements: Extraocular movements  intact.      Conjunctiva/sclera: Conjunctivae normal.      Pupils: Pupils are equal, round, and reactive to light.   Cardiovascular:      Rate and Rhythm: Normal rate and regular rhythm.   Pulmonary:      Effort: Pulmonary effort is normal.      Comments: Congested cough.  Mild rhonchi.  Musculoskeletal:         General: Normal range of motion.      Cervical back: Normal range of motion.   Skin:     General: Skin is warm and dry.   Neurological:      Mental Status: She is alert and oriented to person, place, and time.      Cranial Nerves: No cranial nerve deficit.   Psychiatric:         Behavior: Behavior normal.         ED COURSE and [facility]   2000.  The patient has a cough with hypoxemia on arrival.  This may be worsened compared to her baseline.  She is not using oxygen at home though she has it.  She has been with diarrhea.  She has had some confusion.  The patient's son tells me that she filled the toilet with toilet paper and continue to use it and ended up flooding the bathroom which is very unlike her.  She has been repeating herself.  She was slow to answer his questions tonight.  She may wet her bed twice this past week.  Lab values pending.  Portable chest x-ray.  Covid.  Urine.  Fluid bolus.    2109.  The patient has COVID-19.  Likely pneumonia present on the x-ray.  Patient is at 96% O2 saturation with 3 L/min by nasal cannula.  She is very clear and answering questions well while on oxygen.  I suspect her confusion was related to her hypoxemia.  She has oxygen at home.  Her son lives with the patient.  I will provide Decadron here and then a prescription for more.  I will provide the Covid referral package.  I reviewed this with the patient's son and he is in agreement with the plan to keep an eye on the patient and bring her back for any worsening.    LABS  Labs Ordered and Resulted from Time of ED Arrival to Time of ED Departure   INFLUENZA A/B & SARS-COV2 PCR MULTIPLEX - Abnormal       Result Value     Influenza A PCR Negative      Influenza B PCR Negative      SARS CoV2 PCR Positive (*)    COMPREHENSIVE METABOLIC PANEL - Abnormal    Sodium 142      Potassium 3.5      Chloride 109      Carbon Dioxide (CO2) 25      Anion Gap 8      Urea Nitrogen 18      Creatinine 1.23 (*)     Calcium 8.2 (*)     Glucose 110 (*)     Alkaline Phosphatase 54      AST 32      ALT 30      Protein Total 6.7 (*)     Albumin 2.9 (*)     Bilirubin Total 0.3      GFR Estimate 47 (*)    CRP INFLAMMATION - Abnormal    CRP Inflammation 56.4 (*)    CBC WITH PLATELETS AND DIFFERENTIAL - Abnormal    WBC Count 3.8 (*)     RBC Count 4.22      Hemoglobin 13.0      Hematocrit 40.7      MCV 96      MCH 30.8      MCHC 31.9      RDW 12.4      Platelet Count 148 (*)     % Neutrophils 57      % Lymphocytes 33      % Monocytes 9      % Eosinophils 1      % Basophils 0      % Immature Granulocytes 0      NRBCs per 100 WBC 0      Absolute Neutrophils 2.2      Absolute Lymphocytes 1.3      Absolute Monocytes 0.3      Absolute Eosinophils 0.0      Absolute Basophils 0.0      Absolute Immature Granulocytes 0.0      Absolute NRBCs 0.0     BLOOD GAS VENOUS - Abnormal    pH Venous 7.31 (*)     pCO2 Venous 57 (*)     pO2 Venous 25      Bicarbonate Venous 28      Base Excess/Deficit (+/-) 0.9      FIO2 30     MAGNESIUM - Normal    Magnesium 1.9     LACTIC ACID WHOLE BLOOD - Normal    Lactic Acid 1.0     TROPONIN I - Normal    Troponin I High Sensitivity 20     PROCALCITONIN   ROUTINE UA WITH MICROSCOPIC REFLEX TO CULTURE       IMAGING  Images reviewed by me.  Radiology report also reviewed.  XR Chest Port 1 View   Preliminary Result   IMPRESSION: Mild interstitial prominence in both lower lungs may be related to infection or fibrosis. Hyperinflation both lungs is likely related to COPD. The lungs are otherwise clear. Pulmonary vascularity is within normal limits.          Procedures    Medications   sodium chloride (PF) 0.9% PF flush 3 mL (has no administration in  time range)   sodium chloride (PF) 0.9% PF flush 3 mL (has no administration in time range)   dexamethasone (DECADRON) alcohol-free oral solution 10 mg (has no administration in time range)   0.9% sodium chloride BOLUS (1,000 mLs Intravenous New Bag 1/4/22 2012)         IMPRESSION       ICD-10-CM    1. Pneumonia due to 2019 novel coronavirus  U07.1 COVID-19 GetWell Loop Referral    J12.82 Care Coordination Referral   2. Acute respiratory failure with hypoxia (H)  J96.01             Medication List      Started    dexamethasone 6 MG tablet  Commonly known as: DECADRON  6 mg, Oral, DAILY WITH BREAKFAST            Critical Care Documentation  My initial assessment included review of prehospital provider report, review of nursing observations, review of vital signs, focused history, physical exam and review of cardiac rhythm monitor.  It was determined that the patient had respiratory failure.  This required immediate intervention and critical care decision-making.     Critical care time: 72 minutes.                    Vijay Morgan MD  01/04/22 [id number]

## 2022-01-05 NOTE — ED NOTES
RN educated patient and pt son.  patient verbalized understanding.  No further questions or concerns.

## 2023-10-12 ENCOUNTER — LAB REQUISITION (OUTPATIENT)
Dept: LAB | Facility: CLINIC | Age: 73
End: 2023-10-12
Payer: COMMERCIAL

## 2023-10-12 DIAGNOSIS — N39.46 MIXED INCONTINENCE: ICD-10-CM

## 2023-10-12 PROCEDURE — 87086 URINE CULTURE/COLONY COUNT: CPT | Mod: ORL | Performed by: OBSTETRICS & GYNECOLOGY

## 2023-10-14 LAB
BACTERIA UR CULT: ABNORMAL
BACTERIA UR CULT: ABNORMAL

## 2024-09-23 ENCOUNTER — DOCUMENTATION ONLY (OUTPATIENT)
Dept: GERIATRICS | Facility: CLINIC | Age: 74
End: 2024-09-23
Payer: COMMERCIAL

## 2024-09-24 ENCOUNTER — TRANSITIONAL CARE UNIT VISIT (OUTPATIENT)
Dept: GERIATRICS | Facility: CLINIC | Age: 74
End: 2024-09-24
Payer: COMMERCIAL

## 2024-09-24 VITALS
OXYGEN SATURATION: 95 % | DIASTOLIC BLOOD PRESSURE: 52 MMHG | BODY MASS INDEX: 21.18 KG/M2 | SYSTOLIC BLOOD PRESSURE: 128 MMHG | HEART RATE: 66 BPM | TEMPERATURE: 97.5 F | RESPIRATION RATE: 20 BRPM | WEIGHT: 143 LBS | HEIGHT: 69 IN

## 2024-09-24 DIAGNOSIS — J44.9 CHRONIC OBSTRUCTIVE PULMONARY DISEASE, UNSPECIFIED COPD TYPE (H): Chronic | ICD-10-CM

## 2024-09-24 DIAGNOSIS — F51.02 ADJUSTMENT INSOMNIA: Chronic | ICD-10-CM
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
## 2024-09-24 DIAGNOSIS — S22.41XD CLOSED FRACTURE OF MULTIPLE RIBS OF RIGHT SIDE WITH ROUTINE HEALING, SUBSEQUENT ENCOUNTER: Primary | ICD-10-CM

## 2024-09-24 DIAGNOSIS — K21.9 GASTROESOPHAGEAL REFLUX DISEASE WITHOUT ESOPHAGITIS: Chronic | ICD-10-CM

## 2024-09-24 DIAGNOSIS — G25.81 RESTLESS LEGS SYNDROME: Chronic | ICD-10-CM

## 2024-09-24 DIAGNOSIS — J96.11 CHRONIC RESPIRATORY FAILURE WITH HYPOXIA (H): ICD-10-CM

## 2024-09-24 DIAGNOSIS — F33.1 MAJOR DEPRESSIVE DISORDER, RECURRENT, MODERATE (H): ICD-10-CM

## 2024-09-24 PROCEDURE — 99309 SBSQ NF CARE MODERATE MDM 30: CPT | Performed by: NURSE PRACTITIONER

## 2024-09-24 RX ORDER — ROSUVASTATIN CALCIUM 20 MG/1
20 TABLET, COATED ORAL DAILY
COMMUNITY

## 2024-09-24 RX ORDER — TRAZODONE HYDROCHLORIDE 50 MG/1
50 TABLET, FILM COATED ORAL DAILY PRN
COMMUNITY

## 2024-09-24 RX ORDER — OXYBUTYNIN CHLORIDE 10 MG/1
10 TABLET, EXTENDED RELEASE ORAL DAILY
COMMUNITY

## 2024-09-24 RX ORDER — LIDOCAINE 4 G/G
1 PATCH TOPICAL EVERY 24 HOURS
COMMUNITY

## 2024-09-24 NOTE — LETTER
9/24/2024      Vy Dooley  [address] [facility] [address]        [facility] [place] TCU Admission  PCP & CLINIC: Physician No Ref-Primary, No address on file  Chief Complaint   Patient presents with     Hospital F/U    [place] MRN: [id number]. Place of Service where encounter took place:  [facility] (TCU) [[id number]] Vy Dooley  is a 74 year old  (1950), admitted to the above facility from  [facility]. Hospital stay 9/16 through 9/21. Admitted to this facility for  rehab, medical management, and nursing care. HPI information obtained from: facility chart records, facility staff, patient report, [facility] chart review, and Care Everywhere [facility] chart review.     Brief Summary of Hospital Course: Vy presented to [place] on 9/16 s/p fall at home. She was found to have right side 10th & 11th rib fractures. She was treated for UTI despite negative culture.     Updates since admission to transitional care unit: Vy presented to TCU on 9/21 s/p the above hospitalization. Today, Vy reports that she is no longer having burning with urination.  Her appetite is not very good, and she notes that she does have headaches on and off a couple times a week.  She has a chronic cough and uses oxygen at home.  She reports having constipation before, and this has now resolved.  She denies any new shortness of breath, chest pain or palpitations.  She denies any new swelling.    CODE STATUS/ADVANCE DIRECTIVES DISCUSSION: CPR/Full code. Patient's living condition: lives with family, (son) . ALLERGIES: Erythromycin and Amoxicillin-pot clavulanate PAST MEDICAL HISTORY:  has a past medical history of COPD (chronic obstructive pulmonary disease) (H).. PAST SURGICAL HISTORY:   has a past surgical history that includes Colonoscopy (9/26/2011); Eye surgery; Phacoemulsification with standard intraocular lens implant (Left, 12/18/2017); and Phacoemulsification with standard intraocular lens implant  "(Right, 1/22/2018).. FAMILY HISTORY: family history includes Colon Cancer in her father; Diabetes in her brother and sister; LUNG DISEASE in her mother.. SOCIAL HISTORY:   reports that she quit smoking about 9 years ago. Her smoking use included cigarettes. She does not have any smokeless tobacco history on file. She reports current alcohol use.  Post Discharge Medication Reconciliation Status: discharge medications reconciled and changed, per note/orders.  Current Outpatient Medications   Medication Sig Dispense Refill     albuterol (PROAIR HFA, PROVENTIL HFA, VENTOLIN HFA) 108 (90 BASE) MCG/ACT inhaler Inhale 2 puffs into the lungs every 6 hours       GABAPENTIN PO Take 300 mg by mouth At Bedtime       Lidocaine (LIDOCARE) 4 % Patch Place 1 patch onto the skin every 24 hours. To prevent lidocaine toxicity, patient should be patch free for 12 hrs daily.       Multiple Vitamin (MULTI-VITAMINS) TABS Take 1 tablet by mouth       omeprazole (PRILOSEC) 20 MG DR capsule Take 20 mg by mouth daily.       oxyBUTYnin ER (DITROPAN XL) 10 MG 24 hr tablet Take 10 mg by mouth daily.       rosuvastatin (CRESTOR) 20 MG tablet Take 20 mg by mouth daily.       tiotropium (SPIRIVA RESPIMAT) 2.5 MCG/ACT inhaler Inhale 2 puffs into the lungs daily.       traZODone (DESYREL) 50 MG tablet Take 50 mg by mouth daily as needed for sleep.       ROS: 10 point ROS of systems including Constitutional, Eyes, Respiratory, Cardiovascular, Gastroenterology, Genitourinary, Integumentary, Musculoskeletal, Psychiatric were all negative except for pertinent positives noted in my HPI.  Vitals: /52   Pulse 66   Temp 97.5  F (36.4  C)   Resp 20   Ht 1.753 m (5' 9\")   Wt 64.9 kg (143 lb)   SpO2 95%   BMI 21.12 kg/m    Exam:  GENERAL APPEARANCE: Alert, in no distress, cooperative.   ENT: Mouth/posterior oropharynx intact w/ moist mucous membranes, hearing acuity [place].  EYES: EOM, conjunctivae, lids, pupils and irises normal, PERRL2.   RESP: " Respiratory effort good, no respiratory distress, Lung sounds clear. On 2LO2 via NC.  CV: Auscultation of heart reveals S1, S2, rate and rhythm regular, no murmur, no rub or gallop, Edema 0+ BLE. Peripheral pulses are 2+.  ABDOMEN: Normal bowel sounds, soft, non-tender abdomen, and no masses palpated.  SKIN: Inspection/Palpation of skin and subcutaneous tissue baseline w/ fragility. No wounds/rashes noted.   NEURO: CN II-XII at patient's baseline, sensation baseline PPS.  Noting very mild tremors in upper extremities intermittently.  PSYCH: Insight, judgement, and memory are impaired at likely baseline, affect and mood are happy/engaged, somewhat inattentive.    Lab/Diagnostic data: Recent labs in [facility] reviewed by me today.     ASSESSMENT/PLAN:  Closed fracture of multiple ribs of right side with routine healing, subsequent encounter  Fall, subsequent encounter  Major depressive disorder, recurrent, moderate (H)  Chronic obstructive pulmonary disease, unspecified COPD type (H)  Chronic respiratory failure w/ hypoxia (H)  Gastroesophageal reflux disease without esophagitis  Restless legs syndrome  Adjustment insomnia  Acute on chronic. Complex/Tenuous.   Provider reviewed records from hospitalization, facility, and interpreted most recent imaging/lab work (CBC, BMP), and vital signs, each with unique characteristics requiring MDM.   Provider discussed care and management with nursing, , and rehab team:  Rehab team reports slums of 20/30, and MoCA of 17/30.  They request further evaluation.  Consult SLP to identify functional abilities further.   notes that patient's son lives with her and the patient's son's girlfriend also lives in the dwelling.  They apparently help her with IADLs.   Nursing has no new concerns.  Noting polypharmacy, and potential prescription cascade.  Underlying major depressive disorder, but this may be superimposed on a mild cognitive impairment/early dementia.   Using Wellbutrin which can be stimulating, cause tremors (noted above), contribute to a curbed appetite, and cause insomnia.  After discussion of these items, Vy was agreeable to a trial discontinuation of Wellbutrin.  Of note, polypharmacy is known to contribute to fall risk.  Vy admittedly did not really know what she was taking.  Discontinue Wellbutrin XL.  Reduce trazodone, hopefully this will not be needed moving forward.  Noted initial PRN orders for non antipsychotic psychotropic medications are limited to 14 days. Start new psychotropic medication x 14 days for occasional insomnia.   Discontinue B complex, she is not sure why she is on this and there is no documented B vitamin deficiency.  Reduce omeprazole to lowest, most effective dose.  It may be possible to completely discontinue this after further investigation.  Switch gabapentin to bedtime dosing, this is how she takes this at home.  Blood work reviewed and appropriate, no need to recheck at this time.  Pain from rib fractures is improving.  Continue with regimen.  Provider personally initiated a CODE STATUS and advance care planning discussion.  Detailed chest compressions, defibrillation, intubation, and Vy elects to remain a full code at this time.  POLST signed at facility, and order entered into epic.    Follow up w/in 1 week or as needed.    Orders:  SLP eval/tx x1, routine. Dx: cognitive impairment.   Decrease Trazodone to 50mg PO at bedtime PRN x 14 days. Dx: insomnia.   Discontinue B-complex.   Decrease Omeprazole to 20mg PO Qday. Dx: GERD.   Change Gabapentin to HS dosing. Dx: neuropathy.   Discontinue Wellbutrin.    Electronically signed by:  SHEREE Cleveland CNP [facility]                        Sincerely,        SHEREE Kramer CNP

## 2024-09-24 NOTE — PROGRESS NOTES
[facility] [place] TCU Admission  PCP & CLINIC: Physician No Ref-Primary, No address on file  Chief Complaint   Patient presents with    Hospital F/U    [place] MRN: [id number]. Place of Service where encounter took place:  [facility] (TCU) [[id number]] Vy Dooley  is a 74 year old  (1950), admitted to the above facility from  [facility]. Hospital stay 9/16 through 9/21. Admitted to this facility for  rehab, medical management, and nursing care. HPI information obtained from: facility chart records, facility staff, patient report, [facility] chart review, and Care Everywhere [facility] chart review.     Brief Summary of Hospital Course: Vy presented to [place] on 9/16 s/p fall at home. She was found to have right side 10th & 11th rib fractures. She was treated for UTI despite negative culture.     Updates since admission to transitional care unit: Vy presented to TCU on 9/21 s/p the above hospitalization. Today, Vy reports that she is no longer having burning with urination.  Her appetite is not very good, and she notes that she does have headaches on and off a couple times a week.  She has a chronic cough and uses oxygen at home.  She reports having constipation before, and this has now resolved.  She denies any new shortness of breath, chest pain or palpitations.  She denies any new swelling.    CODE STATUS/ADVANCE DIRECTIVES DISCUSSION: CPR/Full code. Patient's living condition: lives with family, (son) . ALLERGIES: Erythromycin and Amoxicillin-pot clavulanate PAST MEDICAL HISTORY:  has a past medical history of COPD (chronic obstructive pulmonary disease) (H).. PAST SURGICAL HISTORY:   has a past surgical history that includes Colonoscopy (9/26/2011); Eye surgery; Phacoemulsification with standard intraocular lens implant (Left, 12/18/2017); and Phacoemulsification with standard intraocular lens implant (Right, 1/22/2018).. FAMILY HISTORY: family history includes Colon Cancer in her father;  "Diabetes in her brother and sister; LUNG DISEASE in her mother.. SOCIAL HISTORY:   reports that she quit smoking about 9 years ago. Her smoking use included cigarettes. She does not have any smokeless tobacco history on file. She reports current alcohol use.  Post Discharge Medication Reconciliation Status: discharge medications reconciled and changed, per note/orders.  Current Outpatient Medications   Medication Sig Dispense Refill    albuterol (PROAIR HFA, PROVENTIL HFA, VENTOLIN HFA) 108 (90 BASE) MCG/ACT inhaler Inhale 2 puffs into the lungs every 6 hours      GABAPENTIN PO Take 300 mg by mouth At Bedtime      Lidocaine (LIDOCARE) 4 % Patch Place 1 patch onto the skin every 24 hours. To prevent lidocaine toxicity, patient should be patch free for 12 hrs daily.      Multiple Vitamin (MULTI-VITAMINS) TABS Take 1 tablet by mouth      omeprazole (PRILOSEC) 20 MG DR capsule Take 20 mg by mouth daily.      oxyBUTYnin ER (DITROPAN XL) 10 MG 24 hr tablet Take 10 mg by mouth daily.      rosuvastatin (CRESTOR) 20 MG tablet Take 20 mg by mouth daily.      tiotropium (SPIRIVA RESPIMAT) 2.5 MCG/ACT inhaler Inhale 2 puffs into the lungs daily.      traZODone (DESYREL) 50 MG tablet Take 50 mg by mouth daily as needed for sleep.       ROS: 10 point ROS of systems including Constitutional, Eyes, Respiratory, Cardiovascular, Gastroenterology, Genitourinary, Integumentary, Musculoskeletal, Psychiatric were all negative except for pertinent positives noted in my HPI.  Vitals: /52   Pulse 66   Temp 97.5  F (36.4  C)   Resp 20   Ht 1.753 m (5' 9\")   Wt 64.9 kg (143 lb)   SpO2 95%   BMI 21.12 kg/m    Exam:  GENERAL APPEARANCE: Alert, in no distress, cooperative.   ENT: Mouth/posterior oropharynx intact w/ moist mucous membranes, hearing acuity [place].  EYES: EOM, conjunctivae, lids, pupils and irises normal, PERRL2.   RESP: Respiratory effort good, no respiratory distress, Lung sounds clear. On 2LO2 via NC.  CV: Auscultation " of heart reveals S1, S2, rate and rhythm regular, no murmur, no rub or gallop, Edema 0+ BLE. Peripheral pulses are 2+.  ABDOMEN: Normal bowel sounds, soft, non-tender abdomen, and no masses palpated.  SKIN: Inspection/Palpation of skin and subcutaneous tissue baseline w/ fragility. No wounds/rashes noted.   NEURO: CN II-XII at patient's baseline, sensation baseline PPS.  Noting very mild tremors in upper extremities intermittently.  PSYCH: Insight, judgement, and memory are impaired at likely baseline, affect and mood are happy/engaged, somewhat inattentive.    Lab/Diagnostic data: Recent labs in [facility] reviewed by me today.     ASSESSMENT/PLAN:  Closed fracture of multiple ribs of right side with routine healing, subsequent encounter  Fall, subsequent encounter  Major depressive disorder, recurrent, moderate (H)  Chronic obstructive pulmonary disease, unspecified COPD type (H)  Chronic respiratory failure w/ hypoxia (H)  Gastroesophageal reflux disease without esophagitis  Restless legs syndrome  Adjustment insomnia  Acute on chronic. Complex/Tenuous.   Provider reviewed records from hospitalization, facility, and interpreted most recent imaging/lab work (CBC, BMP), and vital signs, each with unique characteristics requiring MDM.   Provider discussed care and management with nursing, , and rehab team:  Rehab team reports slums of 20/30, and MoCA of 17/30.  They request further evaluation.  Consult SLP to identify functional abilities further.   notes that patient's son lives with her and the patient's son's girlfriend also lives in the dwelling.  They apparently help her with IADLs.   Nursing has no new concerns.  Noting polypharmacy, and potential prescription cascade.  Underlying major depressive disorder, but this may be superimposed on a mild cognitive impairment/early dementia.  Using Wellbutrin which can be stimulating, cause tremors (noted above), contribute to a curbed appetite,  and cause insomnia.  After discussion of these items, Vy was agreeable to a trial discontinuation of Wellbutrin.  Of note, polypharmacy is known to contribute to fall risk.  Vy admittedly did not really know what she was taking.  Discontinue Wellbutrin XL.  Reduce trazodone, hopefully this will not be needed moving forward.  Noted initial PRN orders for non antipsychotic psychotropic medications are limited to 14 days. Start new psychotropic medication x 14 days for occasional insomnia.   Discontinue B complex, she is not sure why she is on this and there is no documented B vitamin deficiency.  Reduce omeprazole to lowest, most effective dose.  It may be possible to completely discontinue this after further investigation.  Switch gabapentin to bedtime dosing, this is how she takes this at home.  Blood work reviewed and appropriate, no need to recheck at this time.  Pain from rib fractures is improving.  Continue with regimen.  Provider personally initiated a CODE STATUS and advance care planning discussion.  Detailed chest compressions, defibrillation, intubation, and Vy elects to remain a full code at this time.  POLST signed at facility, and order entered into epic.    Follow up w/in 1 week or as needed.    Orders:  SLP eval/tx x1, routine. Dx: cognitive impairment.   Decrease Trazodone to 50mg PO at bedtime PRN x 14 days. Dx: insomnia.   Discontinue B-complex.   Decrease Omeprazole to 20mg PO Qday. Dx: GERD.   Change Gabapentin to HS dosing. Dx: neuropathy.   Discontinue Wellbutrin.    Electronically signed by:  Dr. Olga Wei, APRN CNP DNP

## 2024-09-26 PROBLEM — J96.11 CHRONIC RESPIRATORY FAILURE WITH HYPOXIA (H): Status: ACTIVE | Noted: 2024-09-26

## 2024-09-26 PROBLEM — F33.1 MAJOR DEPRESSIVE DISORDER, RECURRENT, MODERATE (H): Status: ACTIVE | Noted: 2024-09-26

## 2024-09-30 ENCOUNTER — TRANSITIONAL CARE UNIT VISIT (OUTPATIENT)
Dept: GERIATRICS | Facility: CLINIC | Age: 74
End: 2024-09-30
Payer: COMMERCIAL

## 2024-09-30 VITALS
WEIGHT: 146.1 LBS | HEART RATE: 87 BPM | OXYGEN SATURATION: 94 % | SYSTOLIC BLOOD PRESSURE: 120 MMHG | TEMPERATURE: 97.1 F | BODY MASS INDEX: 21.64 KG/M2 | RESPIRATION RATE: 19 BRPM | DIASTOLIC BLOOD PRESSURE: 70 MMHG | HEIGHT: 69 IN

## 2024-09-30 DIAGNOSIS — J96.11 CHRONIC RESPIRATORY FAILURE WITH HYPOXIA (H): ICD-10-CM

## 2024-09-30 DIAGNOSIS — F33.1 MAJOR DEPRESSIVE DISORDER, RECURRENT, MODERATE (H): ICD-10-CM

## 2024-09-30 DIAGNOSIS — G25.81 RESTLESS LEGS SYNDROME: ICD-10-CM
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
## 2024-09-30 DIAGNOSIS — S22.41XD CLOSED FRACTURE OF MULTIPLE RIBS OF RIGHT SIDE WITH ROUTINE HEALING, SUBSEQUENT ENCOUNTER: Primary | ICD-10-CM

## 2024-09-30 DIAGNOSIS — J44.9 CHRONIC OBSTRUCTIVE PULMONARY DISEASE, UNSPECIFIED COPD TYPE (H): ICD-10-CM

## 2024-09-30 PROBLEM — N18.31 CKD STAGE 3A, GFR 45-59 ML/MIN (H): Status: RESOLVED | Noted: 2024-01-09 | Resolved: 2024-09-30

## 2024-09-30 PROBLEM — E78.2 MIXED HYPERLIPIDEMIA: Status: ACTIVE | Noted: 2020-09-21

## 2024-09-30 PROBLEM — M47.812 SPONDYLOSIS OF CERVICAL REGION WITHOUT MYELOPATHY OR RADICULOPATHY: Status: ACTIVE | Noted: 2020-07-06

## 2024-09-30 PROBLEM — R53.81 PHYSICAL DECONDITIONING: Status: ACTIVE | Noted: 2024-09-17

## 2024-09-30 PROBLEM — N17.9 ACUTE KIDNEY INJURY (H): Status: ACTIVE | Noted: 2024-08-31

## 2024-09-30 PROBLEM — N18.31 CKD STAGE 3A, GFR 45-59 ML/MIN (H): Status: ACTIVE | Noted: 2024-01-09

## 2024-09-30 PROBLEM — E55.9 VITAMIN D INSUFFICIENCY: Status: ACTIVE | Noted: 2019-04-24
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
## 2024-09-30 PROBLEM — A41.9 SEPSIS DUE TO URINARY TRACT INFECTION (H): Status: ACTIVE | Noted: 2024-08-31

## 2024-09-30 PROBLEM — N18.2 CHRONIC KIDNEY DISEASE (CKD), STAGE II (MILD): Status: ACTIVE | Noted: 2022-05-02

## 2024-09-30 PROBLEM — G47.34 NOCTURNAL HYPOXEMIA: Status: ACTIVE | Noted: 2024-09-30

## 2024-09-30 PROBLEM — N30.00 ACUTE CYSTITIS WITHOUT HEMATURIA: Status: ACTIVE | Noted: 2024-08-30

## 2024-09-30 PROBLEM — Z87.891 FORMER SMOKER: Status: ACTIVE | Noted: 2024-09-30

## 2024-09-30 PROBLEM — R91.1 SOLITARY PULMONARY NODULE: Status: ACTIVE | Noted: 2019-07-18

## 2024-09-30 PROBLEM — G31.84 MCI (MILD COGNITIVE IMPAIRMENT): Status: ACTIVE | Noted: 2022-10-06

## 2024-09-30 PROBLEM — G93.40 ENCEPHALOPATHY ACUTE: Status: ACTIVE | Noted: 2024-08-30

## 2024-09-30 PROBLEM — N39.0 SEPSIS DUE TO URINARY TRACT INFECTION (H): Status: ACTIVE | Noted: 2024-08-31

## 2024-09-30 PROCEDURE — 99316 NF DSCHRG MGMT 30 MIN+: CPT | Performed by: NURSE PRACTITIONER

## 2024-09-30 NOTE — PROGRESS NOTES
[facility] TCU DISCHARGE SUMMARY  PATIENT'S NAME: Vy Dooley : 1950 MRN: [id number] Place of Service where encounter took place:  [facility] (TCU) [[id number]] PRIMARY CARE PROVIDER AND CLINIC RESPONSIBLE AFTER TRANSFER: Yael Calvin [facility] Provider     Transferring providers: Dr. Olga Wei, APRN CNP DNP.  Recent Hospitalization/ED: [place] stay  to .  Date of TCU Admission: 24.  Date of TCU (anticipated) Discharge: 10/4/24.  Discharged to: previous home and with family (son).   Cognitive Scores: SLUMS 20/30, MOCA 17/30, ACL 4.4.   Physical Function: WBAT.   DME: None. O2 at home already.  CODE STATUS/ADVANCE DIRECTIVES DISCUSSION:  Full Code.  ALLERGIES: Erythromycin, Amoxicillin, and Amoxicillin-pot clavulanate    DISCHARGE DIAGNOSIS/NURSING FACILITY COURSE:   Closed fracture of multiple ribs of right side with routine healing, subsequent encounter  Fall, subsequent encounter  Chronic obstructive pulmonary disease, unspecified COPD type (H)  Chronic respiratory failure with hypoxia (H)  Major depressive disorder, recurrent, moderate (H)  Restless legs syndrome    Hospitalization:  Vy presented to [place] on  s/p fall at home. She was found to have right side 10th & 11th rib fractures. She was treated for UTI despite negative culture.     Rehab: Vy presented to TCU on  s/p the above hospitalization. Upon her initial evaluation, it was obvious that she didn't know her medication list, and initial cognitive scores correlated w/ MCI or mild dementia. There was some mild prescription cascade with Wellbutrin causing tremors, insomnia, and poor appetite, but then supplementing/adjuncting w/ trazodone, B vitamins/MVI. Throughout her stay, nursing reported her wandering w/o a walker, and taking off her O2 w/o good awareness of tubing location.     Today, Vy feels fine.  She is working on getting her oxygen saturation up during our visit, because she had her oxygen off.  " She is not really feeling shortness of breath except for this moment, and quickly regains her breath and her oxygen saturation.  She denies chest pain, new cough, new fever, wheezing.  She says she is sleeping well.  She denies headache, dizziness, nausea.  Her appetite is better when she is at home.    Recommendations to PCP:  Vy should not drive given her cognitive impairment and poor conditioning.  She is at high risk for falling.  Monitor mental health closely given the discontinuation of Wellbutrin.  Consider the discontinuation of trazodone given sleep is likely to improve without Wellbutrin.    Past Medical History:  has a past medical history of COPD (chronic obstructive pulmonary disease) (H).  Discharge Medications:  Current Outpatient Medications   Medication Sig Dispense Refill    albuterol (PROAIR HFA, PROVENTIL HFA, VENTOLIN HFA) 108 (90 BASE) MCG/ACT inhaler Inhale 2 puffs into the lungs every 6 hours      GABAPENTIN PO Take 300 mg by mouth At Bedtime      Lidocaine (LIDOCARE) 4 % Patch Place 1 patch onto the skin every 24 hours. To prevent lidocaine toxicity, patient should be patch free for 12 hrs daily.      Multiple Vitamin (MULTI-VITAMINS) TABS Take 1 tablet by mouth      omeprazole (PRILOSEC) 20 MG DR capsule Take 20 mg by mouth every other day.      oxyBUTYnin ER (DITROPAN XL) 10 MG 24 hr tablet Take 10 mg by mouth daily.      rosuvastatin (CRESTOR) 20 MG tablet Take 20 mg by mouth daily.      tiotropium (SPIRIVA RESPIMAT) 2.5 MCG/ACT inhaler Inhale 2 puffs into the lungs daily.      traZODone (DESYREL) 50 MG tablet Take 50 mg by mouth daily as needed for sleep.       ROS: Limited secondary to aphasia impairment but today pt reports the above and 4 point ROS including Respiratory, CV, GI and , other than that noted in the HPI,  is negative    Physical Exam: Vitals: /70   Pulse 87   Temp 97.1  F (36.2  C)   Resp 19   Ht 1.753 m (5' 9\")   Wt 66.3 kg (146 lb 1.6 oz)   SpO2 94%   " BMI 21.58 kg/m    GENERAL APPEARANCE: Alert, in no distress, cooperative.   ENT: Mouth/posterior oropharynx intact w/ moist mucous membranes, hearing acuity [place].  EYES: EOM, conjunctivae, lids, pupils and irises normal, PERRL2.   RESP: Respiratory effort good, no respiratory distress, Lung sounds clear/diminished. On 2LO2 via NC.  CV: Auscultation of heart reveals S1, S2, rate and rhythm regular, no murmur, no rub or gallop, Edema 0+ BLE. Peripheral pulses are 2+.  ABDOMEN: Normal bowel sounds, soft, non-tender abdomen, and no masses palpated.  SKIN: Inspection/Palpation of skin and subcutaneous tissue baseline w/ fragility. No wounds/rashes noted.   NEURO: CN II-XII at patient's baseline, sensation baseline PPS.  PSYCH: Insight, judgement, and memory are impaired at baseline, affect and mood are happy/engaged.    Facility Labs: Labs done in SNF are in [place] EPIC. Please refer to them using [employer]/Care Everywhere.    DISCHARGE PLAN:  Follow up labs: No labs orders/due.  Medical Follow Up:  Follow up with primary care provider in 1-4 weeks  MTM referral needed: Yes, recommended.   Current [place] scheduled appointments:  None.  Discharge Services: Home Care:  Occupational Therapy, Physical Therapy, and Registered Nurse    Orders:  Decrease Omeprazole to 20mg PO every other day. Dx: GERD.   BMP, B12 level x1 on 10/2. Dx: CKD.     TOTAL DISCHARGE TIME:   Greater than 30 minutes    Electronically signed by:  Dr. Olga Wei, APRN CNP DNP  ______________      Documentation of Face-to-Face and Certification for Home Health Services   Patient: Vy Dooley YOB: 1950  MRN: [id number]  Today's Date: 9/30/2024  I certify that patient: Vy Dooley is under my care and that I had a face-to-face encounter that meets the provider  face-to-face encounter requirements with this patient on: 9/30/2024. This encounter with the patient was in whole, or in part, for the following medical condition, which is  the primary reason for home health care: rib fxs. I certify that, based on my findings, the following services are medically necessary home health services: Nursing, Occupational Therapy, and Physical Therapy. My clinical findings support the need for the above services because: Nurse is needed: To provide assessment and oversight required in the home to assure adherence to the medical plan due to: cognitive impairment.., Occupational Therapy Services are needed to assess and treat cognitive ability and address ADL safety due to impairment in mobilty., and Physical Therapy Services are needed to assess and treat the following functional impairments: mobility.    Further, I certify that my clinical findings support that this patient is homebound (i.e. absences from home require considerable and taxing effort and are for medical reasons or Worship services or infrequently or of short duration when for other reasons) because: Requires assistance of another person or specialized equipment to access medical services because patient: Is unable to walk greater than 50 feet without rest...    Based on the above findings. I certify that this patient is confined to the home and needs intermittent skilled nursing care, physical therapy and/or speech therapy.  The patient is under my care, and I have initiated the establishment of the plan of care.  This patient will be followed by a provider who will periodically review the plan of care.    Provider to give follow up care: Yael Calvin    Responsible Medicare certified PECOS Provider: SHEREE Cleveland CNP [facility]  Provider Signature: See electronic signature associated with these discharge orders.  Date: 9/30/2024

## 2024-09-30 NOTE — LETTER
2024      Vy Dooley  [address] [facility] [address]        [facility] TCU DISCHARGE SUMMARY  PATIENT'S NAME: Vy Dooley : 1950 MRN: [id number] Place of Service where encounter took place:  [facility] (TCU) [[id number]] PRIMARY CARE PROVIDER AND CLINIC RESPONSIBLE AFTER TRANSFER: Yael Calvin [facility] Provider     Transferring providers: Dr. Olga Wei, APRN CNP DNP.  Recent Hospitalization/ED: [facility] stay  to .  Date of TCU Admission: 24.  Date of TCU (anticipated) Discharge: 10/4/24.  Discharged to: previous home and with family (son).   Cognitive Scores: SLUMS , MOCA , ACL 4.4.   Physical Function: WBAT.   DME: None. O2 at home already.  CODE STATUS/ADVANCE DIRECTIVES DISCUSSION:  Full Code.  ALLERGIES: Erythromycin, Amoxicillin, and Amoxicillin-pot clavulanate    DISCHARGE DIAGNOSIS/NURSING FACILITY COURSE:   Closed fracture of multiple ribs of right side with routine healing, subsequent encounter  Fall, subsequent encounter  Chronic obstructive pulmonary disease, unspecified COPD type (H)  Chronic respiratory failure with hypoxia (H)  Major depressive disorder, recurrent, moderate (H)  Restless legs syndrome    Hospitalization:  Vy presented to [facility] on  s/p fall at home. She was found to have right side 10th & 11th rib fractures. She was treated for UTI despite negative culture.     Rehab: Vy presented to TCU on  s/p the above hospitalization. Upon her initial evaluation, it was obvious that she didn't know her medication list, and initial cognitive scores correlated w/ MCI or mild dementia. There was some mild prescription cascade with Wellbutrin causing tremors, insomnia, and poor appetite, but then supplementing/adjuncting w/ trazodone, B vitamins/MVI. Throughout her stay, nursing reported her wandering w/o a walker, and taking off her O2 w/o good awareness of tubing location.     Today, Vy feels fine.  She is working  on getting her oxygen saturation up during our visit, because she had her oxygen off.  She is not really feeling shortness of breath except for this moment, and quickly regains her breath and her oxygen saturation.  She denies chest pain, new cough, new fever, wheezing.  She says she is sleeping well.  She denies headache, dizziness, nausea.  Her appetite is better when she is at home.    Recommendations to PCP:  Vy should not drive given her cognitive impairment and poor conditioning.  She is at high risk for falling.  Monitor mental health closely given the discontinuation of Wellbutrin.  Consider the discontinuation of trazodone given sleep is likely to improve without Wellbutrin.    Past Medical History:  has a past medical history of COPD (chronic obstructive pulmonary disease) (H).  Discharge Medications:  Current Outpatient Medications   Medication Sig Dispense Refill     albuterol (PROAIR HFA, PROVENTIL HFA, VENTOLIN HFA) 108 (90 BASE) MCG/ACT inhaler Inhale 2 puffs into the lungs every 6 hours       GABAPENTIN PO Take 300 mg by mouth At Bedtime       Lidocaine (LIDOCARE) 4 % Patch Place 1 patch onto the skin every 24 hours. To prevent lidocaine toxicity, patient should be patch free for 12 hrs daily.       Multiple Vitamin (MULTI-VITAMINS) TABS Take 1 tablet by mouth       omeprazole (PRILOSEC) 20 MG DR capsule Take 20 mg by mouth every other day.       oxyBUTYnin ER (DITROPAN XL) 10 MG 24 hr tablet Take 10 mg by mouth daily.       rosuvastatin (CRESTOR) 20 MG tablet Take 20 mg by mouth daily.       tiotropium (SPIRIVA RESPIMAT) 2.5 MCG/ACT inhaler Inhale 2 puffs into the lungs daily.       traZODone (DESYREL) 50 MG tablet Take 50 mg by mouth daily as needed for sleep.       ROS: Limited secondary to aphasia impairment but today pt reports the above and 4 point ROS including Respiratory, CV, GI and , other than that noted in the HPI,  is negative    Physical Exam: Vitals: /70   Pulse 87   Temp  "97.1  F (36.2  C)   Resp 19   Ht 1.753 m (5' 9\")   Wt 66.3 kg (146 lb 1.6 oz)   SpO2 94%   BMI 21.58 kg/m    GENERAL APPEARANCE: Alert, in no distress, cooperative.   ENT: Mouth/posterior oropharynx intact w/ moist mucous membranes, hearing acuity [place].  EYES: EOM, conjunctivae, lids, pupils and irises normal, PERRL2.   RESP: Respiratory effort good, no respiratory distress, Lung sounds clear/diminished. On 2LO2 via NC.  CV: Auscultation of heart reveals S1, S2, rate and rhythm regular, no murmur, no rub or gallop, Edema 0+ BLE. Peripheral pulses are 2+.  ABDOMEN: Normal bowel sounds, soft, non-tender abdomen, and no masses palpated.  SKIN: Inspection/Palpation of skin and subcutaneous tissue baseline w/ fragility. No wounds/rashes noted.   NEURO: CN II-XII at patient's baseline, sensation baseline PPS.  PSYCH: Insight, judgement, and memory are impaired at baseline, affect and mood are happy/engaged.    Facility Labs: Labs done in SNF are in [place] EPIC. Please refer to them using "[employer]/Care Everywhere.    DISCHARGE PLAN:  Follow up labs: No labs orders/due.  Medical Follow Up:  Follow up with primary care provider in 1-4 weeks  MTM referral needed: Yes, recommended.   Current [place] scheduled appointments:  None.  Discharge Services: Home Care:  Occupational Therapy, Physical Therapy, and Registered Nurse    Orders:  Decrease Omeprazole to 20mg PO every other day. Dx: GERD.   BMP, B12 level x1 on 10/2. Dx: CKD.     TOTAL DISCHARGE TIME:   Greater than 30 minutes    Electronically signed by:  Dr. Olga Wei, APRN CNP DNP  ______________      Documentation of Face-to-Face and Certification for Home Health Services   Patient: Vy Dooley YOB: 1950  MRN: [id number]  Today's Date: 9/30/2024  I certify that patient: Vy Dooley is under my care and that I had a face-to-face encounter that meets the provider  face-to-face encounter requirements with this patient on: 9/30/2024. This " encounter with the patient was in whole, or in part, for the following medical condition, which is the primary reason for home health care: rib fxs. I certify that, based on my findings, the following services are medically necessary home health services: Nursing, Occupational Therapy, and Physical Therapy. My clinical findings support the need for the above services because: Nurse is needed: To provide assessment and oversight required in the home to assure adherence to the medical plan due to: cognitive impairment.., Occupational Therapy Services are needed to assess and treat cognitive ability and address ADL safety due to impairment in mobilty., and Physical Therapy Services are needed to assess and treat the following functional impairments: mobility.    Further, I certify that my clinical findings support that this patient is homebound (i.e. absences from home require considerable and taxing effort and are for medical reasons or Alevism services or infrequently or of short duration when for other reasons) because: Requires assistance of another person or specialized equipment to access medical services because patient: Is unable to walk greater than 50 feet without rest...    Based on the above findings. I certify that this patient is confined to the home and needs intermittent skilled nursing care, physical therapy and/or speech therapy.  The patient is under my care, and I have initiated the establishment of the plan of care.  This patient will be followed by a provider who will periodically review the plan of care.    Provider to give follow up care: Yael Calvin    Responsible Medicare certified PECOS Provider: SHEREE Cleveland CNP [facility]  Provider Signature: See electronic signature associated with these discharge orders.  Date: 9/30/2024                   Sincerely,        SHEREE Kramer CNP

## 2024-10-01 ENCOUNTER — LAB REQUISITION (OUTPATIENT)
Dept: LAB | Facility: CLINIC | Age: 74
End: 2024-10-01

## 2024-10-01 DIAGNOSIS — N18.9 CHRONIC KIDNEY DISEASE, UNSPECIFIED: ICD-10-CM

## 2024-10-02 LAB
ANION GAP SERPL CALCULATED.3IONS-SCNC: 11 MMOL/L (ref 7–15)
BUN SERPL-MCNC: 16.6 MG/DL (ref 8–23)
CALCIUM SERPL-MCNC: 8.8 MG/DL (ref 8.8–10.4)
CHLORIDE SERPL-SCNC: 103 MMOL/L (ref 98–107)
CREAT SERPL-MCNC: 0.85 MG/DL (ref 0.51–0.95)
EGFRCR SERPLBLD CKD-EPI 2021: 72 ML/MIN/1.73M2
GLUCOSE SERPL-MCNC: 76 MG/DL (ref 70–99)
HCO3 SERPL-SCNC: 28 MMOL/L (ref 22–29)
POTASSIUM SERPL-SCNC: 4 MMOL/L (ref 3.4–5.3)
SODIUM SERPL-SCNC: 142 MMOL/L (ref 135–145)
VIT B12 SERPL-MCNC: 836 PG/ML (ref 232–1245)

## 2024-10-02 PROCEDURE — 80048 BASIC METABOLIC PNL TOTAL CA: CPT | Performed by: NURSE PRACTITIONER

## 2024-10-02 PROCEDURE — 82607 VITAMIN B-12: CPT | Performed by: NURSE PRACTITIONER

## 2024-10-02 PROCEDURE — P9603 ONE-WAY ALLOW PRORATED MILES: HCPCS | Performed by: NURSE PRACTITIONER

## 2024-10-02 PROCEDURE — 82310 ASSAY OF CALCIUM: CPT | Performed by: NURSE PRACTITIONER

## 2025-03-21 ENCOUNTER — HOSPITAL ENCOUNTER (INPATIENT)
Facility: HOSPITAL | Age: 75
End: 2025-03-21
Attending: EMERGENCY MEDICINE | Admitting: FAMILY MEDICINE
Payer: COMMERCIAL

## 2025-03-21 ENCOUNTER — APPOINTMENT (OUTPATIENT)
Dept: RADIOLOGY | Facility: HOSPITAL | Age: 75
End: 2025-03-21
Payer: COMMERCIAL

## 2025-03-21 ENCOUNTER — APPOINTMENT (OUTPATIENT)
Dept: RADIOLOGY | Facility: HOSPITAL | Age: 75
End: 2025-03-21
Attending: EMERGENCY MEDICINE
Payer: COMMERCIAL

## 2025-03-21 DIAGNOSIS — J96.11 CHRONIC RESPIRATORY FAILURE WITH HYPOXIA (H): ICD-10-CM

## 2025-03-21 DIAGNOSIS — J44.9 CHRONIC OBSTRUCTIVE PULMONARY DISEASE, UNSPECIFIED COPD TYPE (H): Primary | Chronic | ICD-10-CM

## 2025-03-21 DIAGNOSIS — F99 INSOMNIA DUE TO OTHER MENTAL DISORDER: ICD-10-CM

## 2025-03-21 DIAGNOSIS — N39.0 URINARY TRACT INFECTION WITHOUT HEMATURIA, SITE UNSPECIFIED: ICD-10-CM

## 2025-03-21 DIAGNOSIS — R50.9 FEVER, UNSPECIFIED FEVER CAUSE: ICD-10-CM

## 2025-03-21 DIAGNOSIS — G93.40 ACUTE ENCEPHALOPATHY: ICD-10-CM

## 2025-03-21 DIAGNOSIS — R09.02 HYPOXIA: ICD-10-CM

## 2025-03-21 DIAGNOSIS — G25.81 RESTLESS LEGS SYNDROME: Chronic | ICD-10-CM

## 2025-03-21 DIAGNOSIS — F51.05 INSOMNIA DUE TO OTHER MENTAL DISORDER: ICD-10-CM

## 2025-03-21 LAB
ALBUMIN SERPL BCG-MCNC: 4.1 G/DL (ref 3.5–5.2)
ALBUMIN UR-MCNC: 100 MG/DL
ALP SERPL-CCNC: 73 U/L (ref 40–150)
ALT SERPL W P-5'-P-CCNC: 11 U/L (ref 0–50)
AMMONIA PLAS-SCNC: 14 UMOL/L (ref 11–51)
ANION GAP SERPL CALCULATED.3IONS-SCNC: 15 MMOL/L (ref 7–15)
APPEARANCE UR: ABNORMAL
AST SERPL W P-5'-P-CCNC: 28 U/L (ref 0–45)
BACTERIA #/AREA URNS HPF: ABNORMAL /HPF
BASE EXCESS BLDV CALC-SCNC: -0.3 MMOL/L (ref -3–3)
BASOPHILS # BLD AUTO: 0 10E3/UL (ref 0–0.2)
BASOPHILS NFR BLD AUTO: 0 %
BILIRUB DIRECT SERPL-MCNC: 0.26 MG/DL (ref 0–0.3)
BILIRUB SERPL-MCNC: 1 MG/DL
BILIRUB UR QL STRIP: NEGATIVE
BUN SERPL-MCNC: 19.9 MG/DL (ref 8–23)
CALCIUM SERPL-MCNC: 9 MG/DL (ref 8.8–10.4)
CHLORIDE SERPL-SCNC: 101 MMOL/L (ref 98–107)
COLOR UR AUTO: YELLOW
CREAT SERPL-MCNC: 1.15 MG/DL (ref 0.51–0.95)
CREAT SERPL-MCNC: 1.18 MG/DL (ref 0.51–0.95)
EGFRCR SERPLBLD CKD-EPI 2021: 49 ML/MIN/1.73M2
EGFRCR SERPLBLD CKD-EPI 2021: 50 ML/MIN/1.73M2
EOSINOPHIL # BLD AUTO: 0.2 10E3/UL (ref 0–0.7)
EOSINOPHIL NFR BLD AUTO: 2 %
ERYTHROCYTE [DISTWIDTH] IN BLOOD BY AUTOMATED COUNT: 12.7 % (ref 10–15)
FLUAV RNA SPEC QL NAA+PROBE: NEGATIVE
FLUBV RNA RESP QL NAA+PROBE: NEGATIVE
GLUCOSE SERPL-MCNC: 142 MG/DL (ref 70–99)
GLUCOSE UR STRIP-MCNC: NEGATIVE MG/DL
HCO3 BLDV-SCNC: 24 MMOL/L (ref 21–28)
HCO3 SERPL-SCNC: 23 MMOL/L (ref 22–29)
HCT VFR BLD AUTO: 39.1 % (ref 35–47)
HGB BLD-MCNC: 13.3 G/DL (ref 11.7–15.7)
HGB UR QL STRIP: ABNORMAL
HOLD SPECIMEN: NORMAL
IMM GRANULOCYTES # BLD: 0.1 10E3/UL
IMM GRANULOCYTES NFR BLD: 1 %
KETONES UR STRIP-MCNC: ABNORMAL MG/DL
LACTATE SERPL-SCNC: 1.7 MMOL/L (ref 0.7–2)
LACTATE SERPL-SCNC: 2.3 MMOL/L (ref 0.7–2)
LEUKOCYTE ESTERASE UR QL STRIP: ABNORMAL
LYMPHOCYTES # BLD AUTO: 1 10E3/UL (ref 0.8–5.3)
LYMPHOCYTES NFR BLD AUTO: 9 %
MCH RBC QN AUTO: 33.5 PG (ref 26.5–33)
MCHC RBC AUTO-ENTMCNC: 34 G/DL (ref 31.5–36.5)
MCV RBC AUTO: 99 FL (ref 78–100)
MONOCYTES # BLD AUTO: 1.1 10E3/UL (ref 0–1.3)
MONOCYTES NFR BLD AUTO: 10 %
MUCOUS THREADS #/AREA URNS LPF: PRESENT /LPF
NEUTROPHILS # BLD AUTO: 8.7 10E3/UL (ref 1.6–8.3)
NEUTROPHILS NFR BLD AUTO: 79 %
NITRATE UR QL: NEGATIVE
NRBC # BLD AUTO: 0 10E3/UL
NRBC BLD AUTO-RTO: 0 /100
O2/TOTAL GAS SETTING VFR VENT: 28 %
OXYHGB MFR BLDV: 82 % (ref 70–75)
PCO2 BLDV: 36 MM HG (ref 40–50)
PH BLDV: 7.43 [PH] (ref 7.32–7.43)
PH UR STRIP: 6 [PH] (ref 5–7)
PLATELET # BLD AUTO: 197 10E3/UL (ref 150–450)
PO2 BLDV: 48 MM HG (ref 25–47)
POTASSIUM SERPL-SCNC: 3.8 MMOL/L (ref 3.4–5.3)
PROT SERPL-MCNC: 7.4 G/DL (ref 6.4–8.3)
RBC # BLD AUTO: 3.97 10E6/UL (ref 3.8–5.2)
RBC URINE: 11 /HPF
RSV RNA SPEC NAA+PROBE: NEGATIVE
SAO2 % BLDV: 83.7 % (ref 70–75)
SARS-COV-2 RNA RESP QL NAA+PROBE: NEGATIVE
SODIUM SERPL-SCNC: 139 MMOL/L (ref 135–145)
SP GR UR STRIP: 1.02 (ref 1–1.03)
TROPONIN T SERPL HS-MCNC: 32 NG/L
TROPONIN T SERPL HS-MCNC: 35 NG/L
UROBILINOGEN UR STRIP-MCNC: <2 MG/DL
WBC # BLD AUTO: 11 10E3/UL (ref 4–11)
WBC CLUMPS #/AREA URNS HPF: PRESENT /HPF
WBC URINE: >182 /HPF

## 2025-03-21 PROCEDURE — 87637 SARSCOV2&INF A&B&RSV AMP PRB: CPT | Performed by: EMERGENCY MEDICINE

## 2025-03-21 PROCEDURE — 83605 ASSAY OF LACTIC ACID: CPT

## 2025-03-21 PROCEDURE — 81001 URINALYSIS AUTO W/SCOPE: CPT | Performed by: EMERGENCY MEDICINE

## 2025-03-21 PROCEDURE — 94660 CPAP INITIATION&MGMT: CPT

## 2025-03-21 PROCEDURE — 250N000013 HC RX MED GY IP 250 OP 250 PS 637

## 2025-03-21 PROCEDURE — 71045 X-RAY EXAM CHEST 1 VIEW: CPT | Mod: 77

## 2025-03-21 PROCEDURE — 82805 BLOOD GASES W/O2 SATURATION: CPT | Performed by: EMERGENCY MEDICINE

## 2025-03-21 PROCEDURE — 87086 URINE CULTURE/COLONY COUNT: CPT | Performed by: EMERGENCY MEDICINE

## 2025-03-21 PROCEDURE — 82565 ASSAY OF CREATININE: CPT

## 2025-03-21 PROCEDURE — 80053 COMPREHEN METABOLIC PANEL: CPT | Performed by: EMERGENCY MEDICINE

## 2025-03-21 PROCEDURE — 87186 SC STD MICRODIL/AGAR DIL: CPT | Performed by: EMERGENCY MEDICINE

## 2025-03-21 PROCEDURE — 96360 HYDRATION IV INFUSION INIT: CPT | Mod: 59

## 2025-03-21 PROCEDURE — 71045 X-RAY EXAM CHEST 1 VIEW: CPT

## 2025-03-21 PROCEDURE — 82805 BLOOD GASES W/O2 SATURATION: CPT

## 2025-03-21 PROCEDURE — 84484 ASSAY OF TROPONIN QUANT: CPT

## 2025-03-21 PROCEDURE — 258N000003 HC RX IP 258 OP 636: Performed by: EMERGENCY MEDICINE

## 2025-03-21 PROCEDURE — 84484 ASSAY OF TROPONIN QUANT: CPT | Performed by: EMERGENCY MEDICINE

## 2025-03-21 PROCEDURE — 96361 HYDRATE IV INFUSION ADD-ON: CPT | Mod: 59

## 2025-03-21 PROCEDURE — 84100 ASSAY OF PHOSPHORUS: CPT

## 2025-03-21 PROCEDURE — 120N000001 HC R&B MED SURG/OB

## 2025-03-21 PROCEDURE — 36415 COLL VENOUS BLD VENIPUNCTURE: CPT | Performed by: EMERGENCY MEDICINE

## 2025-03-21 PROCEDURE — 87040 BLOOD CULTURE FOR BACTERIA: CPT | Performed by: EMERGENCY MEDICINE

## 2025-03-21 PROCEDURE — 83605 ASSAY OF LACTIC ACID: CPT | Performed by: EMERGENCY MEDICINE

## 2025-03-21 PROCEDURE — 82140 ASSAY OF AMMONIA: CPT | Performed by: EMERGENCY MEDICINE

## 2025-03-21 PROCEDURE — 80048 BASIC METABOLIC PNL TOTAL CA: CPT | Performed by: EMERGENCY MEDICINE

## 2025-03-21 PROCEDURE — 85027 COMPLETE CBC AUTOMATED: CPT

## 2025-03-21 PROCEDURE — 5A09357 ASSISTANCE WITH RESPIRATORY VENTILATION, LESS THAN 24 CONSECUTIVE HOURS, CONTINUOUS POSITIVE AIRWAY PRESSURE: ICD-10-PCS | Performed by: FAMILY MEDICINE

## 2025-03-21 PROCEDURE — 999N000157 HC STATISTIC RCP TIME EA 10 MIN

## 2025-03-21 PROCEDURE — 87149 DNA/RNA DIRECT PROBE: CPT | Performed by: EMERGENCY MEDICINE

## 2025-03-21 PROCEDURE — 99285 EMERGENCY DEPT VISIT HI MDM: CPT | Mod: 25

## 2025-03-21 PROCEDURE — 83735 ASSAY OF MAGNESIUM: CPT

## 2025-03-21 PROCEDURE — 85025 COMPLETE CBC W/AUTO DIFF WBC: CPT | Performed by: EMERGENCY MEDICINE

## 2025-03-21 PROCEDURE — 36415 COLL VENOUS BLD VENIPUNCTURE: CPT

## 2025-03-21 PROCEDURE — 82248 BILIRUBIN DIRECT: CPT

## 2025-03-21 PROCEDURE — 250N000011 HC RX IP 250 OP 636: Performed by: EMERGENCY MEDICINE

## 2025-03-21 PROCEDURE — 250N000011 HC RX IP 250 OP 636

## 2025-03-21 PROCEDURE — 250N000013 HC RX MED GY IP 250 OP 250 PS 637: Performed by: EMERGENCY MEDICINE

## 2025-03-21 RX ORDER — OMEPRAZOLE 20 MG/1
40 CAPSULE, DELAYED RELEASE ORAL DAILY
Status: DISCONTINUED | OUTPATIENT
Start: 2025-03-22 | End: 2025-03-21

## 2025-03-21 RX ORDER — VIBEGRON 75 MG/1
75 TABLET, FILM COATED ORAL DAILY
COMMUNITY
End: 2025-04-10

## 2025-03-21 RX ORDER — KETOROLAC TROMETHAMINE 15 MG/ML
15 INJECTION, SOLUTION INTRAMUSCULAR; INTRAVENOUS ONCE
Status: COMPLETED | OUTPATIENT
Start: 2025-03-22 | End: 2025-03-21

## 2025-03-21 RX ORDER — MEROPENEM 1 G/1
1 INJECTION, POWDER, FOR SOLUTION INTRAVENOUS EVERY 12 HOURS
Status: DISCONTINUED | OUTPATIENT
Start: 2025-03-22 | End: 2025-03-23

## 2025-03-21 RX ORDER — PANTOPRAZOLE SODIUM 40 MG/1
40 TABLET, DELAYED RELEASE ORAL
Status: DISCONTINUED | OUTPATIENT
Start: 2025-03-22 | End: 2025-04-01 | Stop reason: HOSPADM

## 2025-03-21 RX ORDER — ENOXAPARIN SODIUM 100 MG/ML
40 INJECTION SUBCUTANEOUS EVERY 24 HOURS
Status: DISCONTINUED | OUTPATIENT
Start: 2025-03-21 | End: 2025-04-01 | Stop reason: HOSPADM

## 2025-03-21 RX ORDER — CALCIUM CARBONATE 500 MG/1
1000 TABLET, CHEWABLE ORAL 4 TIMES DAILY PRN
Status: DISCONTINUED | OUTPATIENT
Start: 2025-03-21 | End: 2025-04-01 | Stop reason: HOSPADM

## 2025-03-21 RX ORDER — PROCHLORPERAZINE MALEATE 5 MG/1
5 TABLET ORAL EVERY 6 HOURS PRN
Status: DISCONTINUED | OUTPATIENT
Start: 2025-03-21 | End: 2025-04-01 | Stop reason: HOSPADM

## 2025-03-21 RX ORDER — ACETAMINOPHEN 325 MG/1
975 TABLET ORAL ONCE
Status: DISCONTINUED | OUTPATIENT
Start: 2025-03-21 | End: 2025-03-21

## 2025-03-21 RX ORDER — ACETAMINOPHEN 650 MG/1
650 SUPPOSITORY RECTAL ONCE
Status: COMPLETED | OUTPATIENT
Start: 2025-03-21 | End: 2025-03-21

## 2025-03-21 RX ORDER — AMOXICILLIN 250 MG
2 CAPSULE ORAL 2 TIMES DAILY PRN
Status: DISCONTINUED | OUTPATIENT
Start: 2025-03-21 | End: 2025-04-01 | Stop reason: HOSPADM

## 2025-03-21 RX ORDER — OMEPRAZOLE 40 MG/1
40 CAPSULE, DELAYED RELEASE ORAL DAILY
COMMUNITY
End: 2025-04-10

## 2025-03-21 RX ORDER — MEROPENEM 1 G/1
1 INJECTION, POWDER, FOR SOLUTION INTRAVENOUS EVERY 8 HOURS
Status: DISCONTINUED | OUTPATIENT
Start: 2025-03-22 | End: 2025-03-21 | Stop reason: DRUGHIGH

## 2025-03-21 RX ORDER — ONDANSETRON 4 MG/1
4 TABLET, ORALLY DISINTEGRATING ORAL EVERY 6 HOURS PRN
Status: DISCONTINUED | OUTPATIENT
Start: 2025-03-21 | End: 2025-04-01 | Stop reason: HOSPADM

## 2025-03-21 RX ORDER — LIDOCAINE 40 MG/G
CREAM TOPICAL
Status: DISCONTINUED | OUTPATIENT
Start: 2025-03-21 | End: 2025-04-01 | Stop reason: HOSPADM

## 2025-03-21 RX ORDER — TRAZODONE HYDROCHLORIDE 50 MG/1
50-100 TABLET ORAL
Status: DISCONTINUED | OUTPATIENT
Start: 2025-03-21 | End: 2025-03-22

## 2025-03-21 RX ORDER — MEROPENEM 1 G/1
1 INJECTION, POWDER, FOR SOLUTION INTRAVENOUS ONCE
Status: COMPLETED | OUTPATIENT
Start: 2025-03-21 | End: 2025-03-21

## 2025-03-21 RX ORDER — ROPINIROLE 0.5 MG/1
3-4 TABLET, FILM COATED ORAL
Status: ON HOLD | COMMUNITY
End: 2025-04-01

## 2025-03-21 RX ORDER — ACETAMINOPHEN 650 MG/1
650 SUPPOSITORY RECTAL ONCE
Status: COMPLETED | OUTPATIENT
Start: 2025-03-22 | End: 2025-03-21

## 2025-03-21 RX ORDER — ROPINIROLE 0.25 MG/1
1.5-2 TABLET, FILM COATED ORAL
Status: DISCONTINUED | OUTPATIENT
Start: 2025-03-21 | End: 2025-03-22

## 2025-03-21 RX ORDER — AMOXICILLIN 250 MG
1 CAPSULE ORAL 2 TIMES DAILY PRN
Status: DISCONTINUED | OUTPATIENT
Start: 2025-03-21 | End: 2025-04-01 | Stop reason: HOSPADM

## 2025-03-21 RX ORDER — IPRATROPIUM BROMIDE AND ALBUTEROL SULFATE 2.5; .5 MG/3ML; MG/3ML
3 SOLUTION RESPIRATORY (INHALATION)
Status: DISCONTINUED | OUTPATIENT
Start: 2025-03-22 | End: 2025-03-22

## 2025-03-21 RX ORDER — ONDANSETRON 2 MG/ML
4 INJECTION INTRAMUSCULAR; INTRAVENOUS EVERY 6 HOURS PRN
Status: DISCONTINUED | OUTPATIENT
Start: 2025-03-21 | End: 2025-04-01 | Stop reason: HOSPADM

## 2025-03-21 RX ADMIN — TRAZODONE HYDROCHLORIDE 100 MG: 50 TABLET ORAL at 22:16

## 2025-03-21 RX ADMIN — MEROPENEM 1 G: 1 INJECTION, POWDER, FOR SOLUTION INTRAVENOUS at 20:05

## 2025-03-21 RX ADMIN — ACETAMINOPHEN 650 MG: 650 SUPPOSITORY RECTAL at 17:23

## 2025-03-21 RX ADMIN — ENOXAPARIN SODIUM 40 MG: 40 INJECTION SUBCUTANEOUS at 22:17

## 2025-03-21 RX ADMIN — Medication 1 MG: at 22:16

## 2025-03-21 RX ADMIN — SODIUM CHLORIDE 1000 ML: 0.9 INJECTION, SOLUTION INTRAVENOUS at 16:53

## 2025-03-21 RX ADMIN — KETOROLAC TROMETHAMINE 15 MG: 15 INJECTION, SOLUTION INTRAMUSCULAR; INTRAVENOUS at 23:55

## 2025-03-21 ASSESSMENT — ACTIVITIES OF DAILY LIVING (ADL)
CHANGE_IN_FUNCTIONAL_STATUS_SINCE_ONSET_OF_CURRENT_ILLNESS/INJURY: YES
ADLS_ACUITY_SCORE: 41
DIFFICULTY_COMMUNICATING: NO
ADLS_ACUITY_SCORE: 41
DIFFICULTY_EATING/SWALLOWING: NO
ADLS_ACUITY_SCORE: 29
ADLS_ACUITY_SCORE: 41
DRESSING/BATHING_DIFFICULTY: NO
TOILETING: 1-->ASSISTANCE (EQUIPMENT/PERSON) NEEDED (NOT DEVELOPMENTALLY APPROPRIATE)
DOING_ERRANDS_INDEPENDENTLY_DIFFICULTY: YES
ADLS_ACUITY_SCORE: 29
WEAR_GLASSES_OR_BLIND: YES
NUMBER_OF_TIMES_PATIENT_HAS_FALLEN_WITHIN_LAST_SIX_MONTHS: 3
HEARING_DIFFICULTY_OR_DEAF: NO
EQUIPMENT_CURRENTLY_USED_AT_HOME: WALKER, ROLLING;CANE, QUAD
ADLS_ACUITY_SCORE: 41
TOILETING: 1-->ASSISTANCE (EQUIPMENT/PERSON) NEEDED
FALL_HISTORY_WITHIN_LAST_SIX_MONTHS: YES
ADLS_ACUITY_SCORE: 41
ADLS_ACUITY_SCORE: 41
WALKING_OR_CLIMBING_STAIRS_DIFFICULTY: YES
WALKING_OR_CLIMBING_STAIRS: AMBULATION DIFFICULTY, ASSISTANCE 1 PERSON
CONCENTRATING,_REMEMBERING_OR_MAKING_DECISIONS_DIFFICULTY: YES
TOILETING_ISSUES: YES

## 2025-03-21 ASSESSMENT — COLUMBIA-SUICIDE SEVERITY RATING SCALE - C-SSRS: IS THE PATIENT NOT ABLE TO COMPLETE C-SSRS: UNABLE TO VERBALIZE

## 2025-03-21 NOTE — ED NOTES
Bed: [facility]18  Expected date:   Expected time:   Means of arrival:   Comments:  [facility]- 73yo F AMS, hot to touch, frequent UTIs

## 2025-03-21 NOTE — ED PROVIDER NOTES
eMERGENCY dEPARTMENT PROGRESS NOTE        ED COURSE AND MEDICAL DECISION MAKING  Patient was signed out to me by Dr South at change of shift (5:57 PM)      Vy Dooley is a 74 year old female who presents for AMS/delerium.  Pending at the time of signout is results of urinalysis and determination of disposition  7:46 PM urinalysis with pyuria and bacteria consistent with urinary tract infection.  Based on prior urine culture results with ESBL will initiate meropenem.  With patient having reported prior symptoms of encephalopathy with urinary tract infection we will proceed with admission with IV antibiotics and defer any further imaging.  Case was discussed with the family with the residency service.  LAB  Pertinent labs results reviewed   Results for orders placed or performed during the hospital encounter of 03/21/25   XR Chest Port 1 View    Impression    IMPRESSION: Negative chest.   Extra Blue Top Tube   Result Value Ref Range    Hold Specimen JIC    Extra Green Top (Lithium Heparin) Tube   Result Value Ref Range    Hold Specimen JIC    Extra Purple Top Tube   Result Value Ref Range    Hold Specimen JIC    Extra Green Top (Lithium Heparin) ON ICE   Result Value Ref Range    Hold Specimen JIC    Basic metabolic panel   Result Value Ref Range    Sodium 139 135 - 145 mmol/L    Potassium 3.8 3.4 - 5.3 mmol/L    Chloride 101 98 - 107 mmol/L    Carbon Dioxide (CO2) 23 22 - 29 mmol/L    Anion Gap 15 7 - 15 mmol/L    Urea Nitrogen 19.9 8.0 - 23.0 mg/dL    Creatinine 1.15 (H) 0.51 - 0.95 mg/dL    GFR Estimate 50 (L) >60 mL/min/1.73m2    Calcium 9.0 8.8 - 10.4 mg/dL    Glucose 142 (H) 70 - 99 mg/dL   Hepatic function panel   Result Value Ref Range    Protein Total 7.4 6.4 - 8.3 g/dL    Albumin 4.1 3.5 - 5.2 g/dL    Bilirubin Total 1.0 <=1.2 mg/dL    Alkaline Phosphatase 73 40 - 150 U/L    AST 28 0 - 45 U/L    ALT 11 0 - 50 U/L    Bilirubin Direct 0.26 0.00 - 0.30 mg/dL   Lactic Acid Whole Blood with 1X Repeat in 2  HR when >2   Result Value Ref Range    Lactic Acid, Initial 2.3 (H) 0.7 - 2.0 mmol/L   Influenza A/B, RSV and SARS-CoV2 PCR (COVID-19) Nasopharyngeal    Specimen: Nasopharyngeal; Swab   Result Value Ref Range    Influenza A PCR Negative Negative    Influenza B PCR Negative Negative    RSV PCR Negative Negative    SARS CoV2 PCR Negative Negative   Result Value Ref Range    Troponin T, High Sensitivity 32 (H) <=14 ng/L   Ammonia (on ice)   Result Value Ref Range    Ammonia 14 11 - 51 umol/L   CBC with platelets and differential   Result Value Ref Range    WBC Count 11.0 4.0 - 11.0 10e3/uL    RBC Count 3.97 3.80 - 5.20 10e6/uL    Hemoglobin 13.3 11.7 - 15.7 g/dL    Hematocrit 39.1 35.0 - 47.0 %    MCV 99 78 - 100 fL    MCH 33.5 (H) 26.5 - 33.0 pg    MCHC 34.0 31.5 - 36.5 g/dL    RDW 12.7 10.0 - 15.0 %    Platelet Count 197 150 - 450 10e3/uL    % Neutrophils 79 %    % Lymphocytes 9 %    % Monocytes 10 %    % Eosinophils 2 %    % Basophils 0 %    % Immature Granulocytes 1 %    NRBCs per 100 WBC 0 <1 /100    Absolute Neutrophils 8.7 (H) 1.6 - 8.3 10e3/uL    Absolute Lymphocytes 1.0 0.8 - 5.3 10e3/uL    Absolute Monocytes 1.1 0.0 - 1.3 10e3/uL    Absolute Eosinophils 0.2 0.0 - 0.7 10e3/uL    Absolute Basophils 0.0 0.0 - 0.2 10e3/uL    Absolute Immature Granulocytes 0.1 <=0.4 10e3/uL    Absolute NRBCs 0.0 10e3/uL   Lactic acid whole blood   Result Value Ref Range    Lactic Acid 1.7 0.7 - 2.0 mmol/L         RADIOLOGY    Pertinent imaging reviewed   Please see official radiology report.  XR Chest Port 1 View   Final Result   IMPRESSION: Negative chest.          FINAL IMPRESSION    1.  Urinary tract infection  2.  Encephalopathy          Evgeny Galloway DO  03/21/25 1947

## 2025-03-21 NOTE — ED PROVIDER NOTES
EMERGENCY DEPARTMENT ENCOUNTER      NAME: Vy Dooley  AGE: 74 year old female  YOB: 1950  MRN: [id number]  EVALUATION DATE & TIME: 3/21/2025  3:51 PM    PCP: No Ref-Primary, Physician    ED PROVIDER: Gilberto Almeida M.D.      Chief Complaint   Patient presents with    Altered Mental Status         FINAL IMPRESSION:  1. Acute encephalopathy    2. Fever, unspecified fever cause    3. Hypoxia          ED COURSE & MEDICAL DECISION MAKING:    Pertinent Labs & Imaging studies reviewed below.  All EKGs below represent my independent interpretation.   ED Course as of 03/21/25 1802   Fri Mar 21, 2025   1749 Patient is a 74-year-old woman with a history of frequent UTIs, ESBL UTIs, here with confusion.  She was mildly hypoxic in the 80s, placed on 2 L nasal cannula oxygen on arrival.  She has temperature of 100.9, but normal blood pressure and heart rate.  She is confused, has poor attention, consistent with delirium.  Based on my chart review, she was admitted to  on 1/13/2025 for similar presentation, found to have UTI and her confusion cleared hospital day 1.   1750 So far lab work shows mildly elevated lactic acid of 2.3, but normal white blood cell count, normal hepatic panel, normal viral panel.  Ammonia is also normal.  Repeat lactate after bolus of normal saline came back at 1.7.   1751 She was given 650 mg suppository Tylenol.   1751 Chest x-ray is clear.   UA pending, straight cath will be needed given her encephalopathy.  Her presentation is very consistent with prior documented UTIs, and UA is convincing then will recommend starting meropenem, admit.  Care was signed out to the oncoming ED physician Dr. Galloway.    If UA is negative, broader infectious workup needs to be started including CT of the chest, abdomen.         Additional ED Course timestamps entered by medical scribe:  4:06 PM I met with the patient, obtained history, performed an initial exam, and discussed  options and plan for diagnostics and treatment here in the ED.   6:06 PM initial attempted straight cath was unsuccessful, delays in antibiotics are due to this.  Second attempt undergoing now.    Medical Decision Making  Obtained supplemental history:Supplemental history obtained?: EMS  Reviewed external records: External records reviewed?: Inpatient Record: 1/13/2025 [facility] Admission  Care impacted by chronic illness: Mild cognitive impairment, asthma, stage 2 chronic kidney disease, COPD, gastroesophageal reflux disease, hyperlipidemia, osteopenia   Did you consider but not order tests?: Work up considered but not performed and documented in chart, if applicable  Did you interpret images independently?: Independent interpretation of ECG and images noted in documentation, when applicable.  Consultation discussion with other provider:Did you involve another provider (consultant, MH, pharmacy, etc.)?: I discussed the care with another health care provider, see documentation for details.  Admission considered. Patient was signed out to the oncoming physician, disposition pending.    MIPS (CTPE, Dental pain, [place], Sinusitis, Asthma/COPD, Head Trauma): Not Applicable    SEPSIS: The patient has signs of sepsis   Sepsis ED evaluation   The patient has signs of sepsis as evidenced by:  1. Presence of 2 SIRS criteria, suspected infection, AND  2. Organ dysfunction: Lactic Acidosis with value >2.0 due to sepsis and Acute encephalopathy due to sepsis    Sepsis Care Initiation: Starting at  4:05 PM on 03/21/25, until specified. Prior to this documentation, sepsis, severe sepsis, or septic shock was NOT thought to be a significant cause of illness. This order represents the first time infection was seriously considered to be affecting the patient.    Lactic Acid Results:  Recent Labs   Lab Test 03/21/25  1650 03/21/25  1605 01/04/22 2011   LACT 1.7 2.3* 1.0       3 Hour Bundle 6 Hour Bundle (Reassessment)   Blood  Cultures before IV Antibiotics: Yes  Antibiotics given: see below  Prehospital fluid volume (mL):                     Total fluids given (ED +Pre-hospital):  The patient responded to a lesser volume of IV fluids. The initial volume ordered was 1000 mL.    Repeat Lactic Acid Level: Ordered by reflex for 2 hours after initial lactic acid collection.  Vasopressors: MAP>65 after initial IVF bolus, will continue to monitor fluid status and vital signs.  Repeat perfusion exam: I attest to having performed a repeat sepsis exam and assessment of perfusion at  4:50 PM.   BMI Readings from Last 1 Encounters:   09/30/24 21.58 kg/m        Anti-infectives (From admission through now)      None                  MEDICATIONS GIVEN IN THE EMERGENCY:  Medications   sodium chloride 0.9% BOLUS 1,000 mL (1,000 mLs Intravenous $New Bag 3/21/25 5750)   acetaminophen (TYLENOL) Suppository 650 mg (650 mg Rectal $Given 3/21/25 5762)         NEW PRESCRIPTIONS STARTED AT TODAY'S ER VISIT  New Prescriptions    No medications on file          =================================================================    HPI    Patient's history and review of systems limited by altered mental status.     Vy Dooley is a 74 year old female with a pertinent medical history of recurrent UTI secondary to ESBL, mild cognitive impairment, asthma, stage 2 chronic kidney disease, COPD, gastroesophageal reflux disease, hyperlipidemia, osteopenia and prediabetes, who presents to this ED for evaluation of altered mental status.     Per EMS personnel, the patient is normally alert and oriented x4; However, since about 4:00 AM today (3/21/2025), she has been confused. At this time, she is only oriented to self. There are concerns for a urinary tract infection as she does have recurrent UTIs. EMS personnel administered IV fluids as she appeared to be dry, but she became hypertensive-fluids were discontinued after 100 mL. SpO2 was in the 80s upon EMS arrival and she  was placed on 2 liters of oxygen. The patient is on supplemental oxygen at home. Her blood glucose was 191 mg/dL en route.     Per the patient, she denies having a headache, abdominal pain or any other pain-related complaints.     Per Chart Review, the patient was admitted to [facility] on 1/13/2025 with generalized weakness, urinary frequency and a fall. Labs in the emergency department were unremarkable. UA was suggestive of a UTI. She was started on IV meropeneum based on prior urine cultures. Her confusion resolved the day after she was admitted. A urine culture grew out Klebsiella variicola resistant to ampicillin. The patient was transitioned to seven-day course of p.o cefdinir. She was started on vaginal estrogen and discharged with p.o vancomycin prophylaxis due to history of C. diff in the past six months. Patient declined TCU and was discharged with [facility].       VITALS:  /62   Pulse 95   Temp 100.9  F (38.3  C) (Oral)   Resp 20   SpO2 94%     PHYSICAL EXAM    Constitutional: Uncomfortable-appearing, confused. On 2 L nasal canula.   HENT: Atraumatic, nose normal. Neck- Supple, gross ROM intact. Dry mucous membranes.   Eyes: Pupils mid-range, conjunctiva without injection, no discharge.   Respiratory: Clear to auscultation bilaterally, no respiratory distress, no wheezing, speaks full sentences easily. No cough.  Cardiovascular: Normal heart rate, regular rhythm, no murmurs.   GI: Soft, no tenderness to deep palpation in all quadrants, no masses.  Musculoskeletal: Moving all 4 extremities intentionally and without pain. No obvious deformity.  Skin: Dry, no rash. Skin is hot to touch.   Neurologic: Alert & oriented to person only, cranial nerves grossly intact.  Psychiatric: Affect normal, cooperative.    EKG: Sinus rhythm with PACs, rate of 99.  No acute ischemic ST elevation or depression.  Right bundle branch block.  Left anterior second block.  QTc prolongation of 5 to 5 ms QRS prolongation  124 ms, normal TX.  No prior EKG for comparison.  Impression: Sinus rhythm, rate bundle-branch block, left anterior fascicular block.        I, Ashlie Phelps am serving as a scribe to document services personally performed by Dr. Gilberto Almeida based on my observation and the provider's statements to me. I, Gilberto Almeida MD attest that Ashlie Phelps is acting in a scribe capacity, has observed my performance of the services and has documented them in accordance with my direction.    Gilberto Almeida M.D.  Emergency Medicine  [facility] EMERGENCY DEPARTMENT  [facility] [facility] [address] [phone]  Dept: [phone]       Gilberto Almeida MD  03/21/25 1800       Gilberto Almeida MD  03/21/25 2199

## 2025-03-21 NOTE — MEDICATION SCRIBE - ADMISSION MEDICATION HISTORY
Medication Scribe Admission Medication History    Admission medication history is complete. The information provided in this note is only as accurate as the sources available at the time of the update.    Information Source(s): Family member, Prescription bottles, and CareEverywhere/SureScripts via phone    Pertinent Information: Patient unable to be interviewed. Called patient's son, Jose @ [phone] and conducted phone interview with Jose and his girlfriend.     Patient reported to self manage medications. Jose and girlfriend live with patient and they both spent 30 minutes going through the entire house looking for any and all medications. Only 4 medications were found. These were the only medications kept on PTA list. All other medications were delete from profile. PTA meds were marked as taking and with an unknown time.     Sure script data was accurate for pill bottles that were found. For example: Gemtesa bottle was found that was dated 24. Sure scripts has 90 day supply filled on that date. (Only one dispensed in the last 12 months.) There were about 50% of pills left in the the bottle.     Ropinirole: Patient had prescription bottle that had  9/15/22. Jose's girlfriend reported that she has active prescription for Ropinirole and that patient doses from this supply. Patient reported to take 3-4 (0.5 mg) tablets as needed at bedtime.     Changes made to PTA medication list:  Added: Requip  Deleted: Albuterol, Gabapentin, Lidocaine, Mult Vit, Oxybutynin, Crestor, Spiriva,   Changed: Prilosec to 40 mg, Trazodone to 1-2 tabs PRN    Allergies reviewed with patient and updates made in EHR: yes    Medication History Completed By: Bishop Tobin 3/21/2025 5:53 PM    PTA Med List   Medication Sig Last Dose/Taking    omeprazole (PRILOSEC) 40 MG DR capsule Take 40 mg by mouth daily. Unknown    rOPINIRole (REQUIP) 0.5 MG tablet Take 3-4 tablets by mouth nightly as needed. 3/20/2025 Bedtime    traZODone  (DESYREL) 50 MG tablet Take 1-2 tablets by mouth nightly as needed for sleep. Unknown    vibegron (GEMTESA) 75 MG TABS tablet Take 75 mg by mouth daily. Unknown
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
## 2025-03-22 PROBLEM — A41.51 SEPSIS DUE TO ESCHERICHIA COLI WITH ENCEPHALOPATHY WITHOUT SEPTIC SHOCK (H): Status: ACTIVE | Noted: 2025-03-22

## 2025-03-22 PROBLEM — R65.20 SEPSIS DUE TO ESCHERICHIA COLI WITH ENCEPHALOPATHY WITHOUT SEPTIC SHOCK (H): Status: ACTIVE | Noted: 2025-03-22

## 2025-03-22 PROBLEM — G93.41 SEPSIS DUE TO ESCHERICHIA COLI WITH ENCEPHALOPATHY WITHOUT SEPTIC SHOCK (H): Status: ACTIVE | Noted: 2025-03-22

## 2025-03-22 LAB
ACINETOBACTER SPECIES: NOT DETECTED
ALBUMIN SERPL BCG-MCNC: 3.8 G/DL (ref 3.5–5.2)
ALP SERPL-CCNC: 70 U/L (ref 40–150)
ALT SERPL W P-5'-P-CCNC: 14 U/L (ref 0–50)
ANION GAP SERPL CALCULATED.3IONS-SCNC: 10 MMOL/L (ref 7–15)
ANION GAP SERPL CALCULATED.3IONS-SCNC: 11 MMOL/L (ref 7–15)
AST SERPL W P-5'-P-CCNC: 40 U/L (ref 0–45)
ATRIAL RATE - MUSE: 140 BPM
ATRIAL RATE - MUSE: 85 BPM
BASE EXCESS BLDV CALC-SCNC: -3.4 MMOL/L (ref -3–3)
BILIRUB DIRECT SERPL-MCNC: 0.42 MG/DL (ref 0–0.3)
BILIRUB SERPL-MCNC: 1.1 MG/DL
BLACTX-M ISLT/SPM QL: NOT DETECTED
BLAIMP ISLT/SPM QL: NOT DETECTED
BLAKPC ISLT/SPM QL: NOT DETECTED
BLAVIM ISLT/SPM QL: NOT DETECTED
BUN SERPL-MCNC: 23.2 MG/DL (ref 8–23)
BUN SERPL-MCNC: 26.4 MG/DL (ref 8–23)
CALCIUM SERPL-MCNC: 7.9 MG/DL (ref 8.8–10.4)
CALCIUM SERPL-MCNC: 8.4 MG/DL (ref 8.8–10.4)
CHLORIDE SERPL-SCNC: 104 MMOL/L (ref 98–107)
CHLORIDE SERPL-SCNC: 106 MMOL/L (ref 98–107)
CITROBACTER SPECIES: NOT DETECTED
CREAT SERPL-MCNC: 1.18 MG/DL (ref 0.51–0.95)
CREAT SERPL-MCNC: 1.34 MG/DL (ref 0.51–0.95)
DIASTOLIC BLOOD PRESSURE - MUSE: NORMAL MMHG
DIASTOLIC BLOOD PRESSURE - MUSE: NORMAL MMHG
E COLI DNA BLD POS QL NAA+NON-PROBE: DETECTED
EGFRCR SERPLBLD CKD-EPI 2021: 42 ML/MIN/1.73M2
EGFRCR SERPLBLD CKD-EPI 2021: 49 ML/MIN/1.73M2
ENTEROBACTER SPECIES: NOT DETECTED
ERYTHROCYTE [DISTWIDTH] IN BLOOD BY AUTOMATED COUNT: 12.7 % (ref 10–15)
ERYTHROCYTE [DISTWIDTH] IN BLOOD BY AUTOMATED COUNT: 12.7 % (ref 10–15)
GLUCOSE BLDC GLUCOMTR-MCNC: 135 MG/DL (ref 70–99)
GLUCOSE SERPL-MCNC: 130 MG/DL (ref 70–99)
GLUCOSE SERPL-MCNC: 133 MG/DL (ref 70–99)
HCO3 BLDV-SCNC: 23 MMOL/L (ref 21–28)
HCO3 SERPL-SCNC: 21 MMOL/L (ref 22–29)
HCO3 SERPL-SCNC: 22 MMOL/L (ref 22–29)
HCT VFR BLD AUTO: 34.3 % (ref 35–47)
HCT VFR BLD AUTO: 39.7 % (ref 35–47)
HGB BLD-MCNC: 11.4 G/DL (ref 11.7–15.7)
HGB BLD-MCNC: 12.8 G/DL (ref 11.7–15.7)
INTERPRETATION ECG - MUSE: NORMAL
INTERPRETATION ECG - MUSE: NORMAL
K OXYTOCA DNA BLD POS QL NAA+NON-PROBE: NOT DETECTED
KLEBSIELLA PNEUMONIAE: NOT DETECTED
LACTATE SERPL-SCNC: 1.2 MMOL/L (ref 0.7–2)
LACTATE SERPL-SCNC: 2.7 MMOL/L (ref 0.7–2)
MAGNESIUM SERPL-MCNC: 1.8 MG/DL (ref 1.7–2.3)
MCH RBC QN AUTO: 31.8 PG (ref 26.5–33)
MCH RBC QN AUTO: 32.2 PG (ref 26.5–33)
MCHC RBC AUTO-ENTMCNC: 32.2 G/DL (ref 31.5–36.5)
MCHC RBC AUTO-ENTMCNC: 33.2 G/DL (ref 31.5–36.5)
MCV RBC AUTO: 97 FL (ref 78–100)
MCV RBC AUTO: 99 FL (ref 78–100)
NDM: NOT DETECTED
O2/TOTAL GAS SETTING VFR VENT: 60 %
OXA (DETECTED/NOT DETECTED): NOT DETECTED
OXYHGB MFR BLDV: 41 % (ref 70–75)
P AERUGINOSA DNA BLD POS NAA+NON-PROBE: NOT DETECTED
P AXIS - MUSE: 84 DEGREES
P AXIS - MUSE: 85 DEGREES
PCO2 BLDV: 46 MM HG (ref 40–50)
PH BLDV: 7.31 [PH] (ref 7.32–7.43)
PHOSPHATE SERPL-MCNC: 2.7 MG/DL (ref 2.5–4.5)
PLATELET # BLD AUTO: 154 10E3/UL (ref 150–450)
PLATELET # BLD AUTO: 158 10E3/UL (ref 150–450)
PO2 BLDV: 27 MM HG (ref 25–47)
POTASSIUM SERPL-SCNC: 3.4 MMOL/L (ref 3.4–5.3)
POTASSIUM SERPL-SCNC: 3.8 MMOL/L (ref 3.4–5.3)
PR INTERVAL - MUSE: 136 MS
PR INTERVAL - MUSE: 138 MS
PROT SERPL-MCNC: 6.9 G/DL (ref 6.4–8.3)
PROTEUS SP DNA BLD POS QL NAA+NON-PROBE: NOT DETECTED
QRS DURATION - MUSE: 128 MS
QRS DURATION - MUSE: 128 MS
QT - MUSE: 280 MS
QT - MUSE: 446 MS
QTC - MUSE: 427 MS
QTC - MUSE: 530 MS
R AXIS - MUSE: -81 DEGREES
R AXIS - MUSE: -82 DEGREES
RBC # BLD AUTO: 3.54 10E6/UL (ref 3.8–5.2)
RBC # BLD AUTO: 4.02 10E6/UL (ref 3.8–5.2)
SAO2 % BLDV: 41.5 % (ref 70–75)
SODIUM SERPL-SCNC: 137 MMOL/L (ref 135–145)
SODIUM SERPL-SCNC: 137 MMOL/L (ref 135–145)
SYSTOLIC BLOOD PRESSURE - MUSE: NORMAL MMHG
SYSTOLIC BLOOD PRESSURE - MUSE: NORMAL MMHG
T AXIS - MUSE: 44 DEGREES
T AXIS - MUSE: 89 DEGREES
TROPONIN T SERPL HS-MCNC: 29 NG/L
TROPONIN T SERPL HS-MCNC: 66 NG/L
TROPONIN T SERPL HS-MCNC: 71 NG/L
TROPONIN T SERPL HS-MCNC: 74 NG/L
VENTRICULAR RATE- MUSE: 140 BPM
VENTRICULAR RATE- MUSE: 85 BPM
WBC # BLD AUTO: 7.3 10E3/UL (ref 4–11)
WBC # BLD AUTO: 8.2 10E3/UL (ref 4–11)

## 2025-03-22 PROCEDURE — 250N000011 HC RX IP 250 OP 636

## 2025-03-22 PROCEDURE — 36415 COLL VENOUS BLD VENIPUNCTURE: CPT

## 2025-03-22 PROCEDURE — 85027 COMPLETE CBC AUTOMATED: CPT

## 2025-03-22 PROCEDURE — 250N000013 HC RX MED GY IP 250 OP 250 PS 637

## 2025-03-22 PROCEDURE — 93005 ELECTROCARDIOGRAM TRACING: CPT

## 2025-03-22 PROCEDURE — 250N000009 HC RX 250

## 2025-03-22 PROCEDURE — 999N000157 HC STATISTIC RCP TIME EA 10 MIN

## 2025-03-22 PROCEDURE — 99223 1ST HOSP IP/OBS HIGH 75: CPT | Mod: AI

## 2025-03-22 PROCEDURE — 120N000001 HC R&B MED SURG/OB

## 2025-03-22 PROCEDURE — 94640 AIRWAY INHALATION TREATMENT: CPT

## 2025-03-22 PROCEDURE — 250N000009 HC RX 250: Performed by: FAMILY MEDICINE

## 2025-03-22 PROCEDURE — 94640 AIRWAY INHALATION TREATMENT: CPT | Mod: 76

## 2025-03-22 PROCEDURE — 93010 ELECTROCARDIOGRAM REPORT: CPT | Performed by: INTERNAL MEDICINE

## 2025-03-22 PROCEDURE — 258N000003 HC RX IP 258 OP 636

## 2025-03-22 PROCEDURE — 999N000156 HC STATISTIC RCP CONSULT EA 30 MIN

## 2025-03-22 PROCEDURE — 272N000202 HC AEROBIKA WITH MANOMETER

## 2025-03-22 PROCEDURE — 80048 BASIC METABOLIC PNL TOTAL CA: CPT

## 2025-03-22 PROCEDURE — 83605 ASSAY OF LACTIC ACID: CPT

## 2025-03-22 PROCEDURE — 84484 ASSAY OF TROPONIN QUANT: CPT

## 2025-03-22 PROCEDURE — 94660 CPAP INITIATION&MGMT: CPT

## 2025-03-22 RX ORDER — ROPINIROLE 1 MG/1
2 TABLET, FILM COATED ORAL
Status: DISCONTINUED | OUTPATIENT
Start: 2025-03-22 | End: 2025-04-01 | Stop reason: HOSPADM

## 2025-03-22 RX ORDER — ACETAMINOPHEN 650 MG/1
650 SUPPOSITORY RECTAL ONCE
Status: COMPLETED | OUTPATIENT
Start: 2025-03-22 | End: 2025-03-22

## 2025-03-22 RX ORDER — ACETAMINOPHEN 325 MG/1
650 TABLET ORAL EVERY 6 HOURS PRN
Status: DISCONTINUED | OUTPATIENT
Start: 2025-03-22 | End: 2025-04-01 | Stop reason: HOSPADM

## 2025-03-22 RX ORDER — ALBUTEROL SULFATE 90 UG/1
2 INHALANT RESPIRATORY (INHALATION) EVERY 6 HOURS PRN
Status: DISCONTINUED | OUTPATIENT
Start: 2025-03-22 | End: 2025-04-01 | Stop reason: HOSPADM

## 2025-03-22 RX ORDER — OLANZAPINE 10 MG/2ML
2.5 INJECTION, POWDER, FOR SOLUTION INTRAMUSCULAR
Status: COMPLETED | OUTPATIENT
Start: 2025-03-22 | End: 2025-03-22

## 2025-03-22 RX ORDER — TRAZODONE HYDROCHLORIDE 50 MG/1
100 TABLET ORAL
Status: DISCONTINUED | OUTPATIENT
Start: 2025-03-22 | End: 2025-04-01 | Stop reason: HOSPADM

## 2025-03-22 RX ORDER — SODIUM CHLORIDE, SODIUM LACTATE, POTASSIUM CHLORIDE, CALCIUM CHLORIDE 600; 310; 30; 20 MG/100ML; MG/100ML; MG/100ML; MG/100ML
INJECTION, SOLUTION INTRAVENOUS CONTINUOUS
Status: DISCONTINUED | OUTPATIENT
Start: 2025-03-22 | End: 2025-03-23

## 2025-03-22 RX ORDER — ACETAMINOPHEN 650 MG/1
650 SUPPOSITORY RECTAL EVERY 6 HOURS PRN
Status: DISCONTINUED | OUTPATIENT
Start: 2025-03-22 | End: 2025-04-01 | Stop reason: HOSPADM

## 2025-03-22 RX ORDER — HYDROXYZINE HYDROCHLORIDE 25 MG/ML
25 INJECTION, SOLUTION INTRAMUSCULAR ONCE
Status: COMPLETED | OUTPATIENT
Start: 2025-03-22 | End: 2025-03-22

## 2025-03-22 RX ORDER — IPRATROPIUM BROMIDE AND ALBUTEROL SULFATE 2.5; .5 MG/3ML; MG/3ML
3 SOLUTION RESPIRATORY (INHALATION)
Status: DISCONTINUED | OUTPATIENT
Start: 2025-03-22 | End: 2025-03-25

## 2025-03-22 RX ADMIN — IPRATROPIUM BROMIDE AND ALBUTEROL SULFATE 3 ML: .5; 3 SOLUTION RESPIRATORY (INHALATION) at 19:29

## 2025-03-22 RX ADMIN — IPRATROPIUM BROMIDE AND ALBUTEROL SULFATE 3 ML: .5; 3 SOLUTION RESPIRATORY (INHALATION) at 15:31

## 2025-03-22 RX ADMIN — IPRATROPIUM BROMIDE AND ALBUTEROL SULFATE 3 ML: .5; 3 SOLUTION RESPIRATORY (INHALATION) at 11:45

## 2025-03-22 RX ADMIN — SODIUM CHLORIDE, SODIUM LACTATE, POTASSIUM CHLORIDE, AND CALCIUM CHLORIDE: .6; .31; .03; .02 INJECTION, SOLUTION INTRAVENOUS at 15:01

## 2025-03-22 RX ADMIN — ACETAMINOPHEN 650 MG: 650 SUPPOSITORY RECTAL at 00:42

## 2025-03-22 RX ADMIN — SODIUM CHLORIDE, SODIUM LACTATE, POTASSIUM CHLORIDE, AND CALCIUM CHLORIDE 500 ML: .6; .31; .03; .02 INJECTION, SOLUTION INTRAVENOUS at 14:02

## 2025-03-22 RX ADMIN — MEROPENEM 1 G: 1 INJECTION, POWDER, FOR SOLUTION INTRAVENOUS at 09:39

## 2025-03-22 RX ADMIN — ACETAMINOPHEN 650 MG: 325 TABLET ORAL at 20:32

## 2025-03-22 RX ADMIN — MEROPENEM 1 G: 1 INJECTION, POWDER, FOR SOLUTION INTRAVENOUS at 19:54

## 2025-03-22 RX ADMIN — OLANZAPINE 2.5 MG: 10 INJECTION, POWDER, FOR SOLUTION INTRAMUSCULAR at 02:13

## 2025-03-22 RX ADMIN — ENOXAPARIN SODIUM 40 MG: 40 INJECTION SUBCUTANEOUS at 20:32

## 2025-03-22 RX ADMIN — IPRATROPIUM BROMIDE AND ALBUTEROL SULFATE 3 ML: .5; 3 SOLUTION RESPIRATORY (INHALATION) at 08:07

## 2025-03-22 RX ADMIN — SODIUM CHLORIDE, SODIUM LACTATE, POTASSIUM CHLORIDE, AND CALCIUM CHLORIDE 500 ML: .6; .31; .03; .02 INJECTION, SOLUTION INTRAVENOUS at 09:38

## 2025-03-22 RX ADMIN — HYDROXYZINE HYDROCHLORIDE 25 MG: 25 INJECTION, SOLUTION INTRAMUSCULAR at 00:48

## 2025-03-22 ASSESSMENT — ACTIVITIES OF DAILY LIVING (ADL)
ADLS_ACUITY_SCORE: 29
ADLS_ACUITY_SCORE: 34
ADLS_ACUITY_SCORE: 34
ADLS_ACUITY_SCORE: 44
ADLS_ACUITY_SCORE: 44
ADLS_ACUITY_SCORE: 45
ADLS_ACUITY_SCORE: 34
ADLS_ACUITY_SCORE: 29
ADLS_ACUITY_SCORE: 44
ADLS_ACUITY_SCORE: 45
ADLS_ACUITY_SCORE: 29
ADLS_ACUITY_SCORE: 45
ADLS_ACUITY_SCORE: 45
ADLS_ACUITY_SCORE: 44
ADLS_ACUITY_SCORE: 44
ADLS_ACUITY_SCORE: 34
ADLS_ACUITY_SCORE: 34
ADLS_ACUITY_SCORE: 44
ADLS_ACUITY_SCORE: 45
ADLS_ACUITY_SCORE: 45
ADLS_ACUITY_SCORE: 34
ADLS_ACUITY_SCORE: 44
ADLS_ACUITY_SCORE: 34

## 2025-03-22 NOTE — PROGRESS NOTES
CLINICAL NUTRITION SERVICES - ASSESSMENT NOTE    RECOMMENDATIONS FOR MDs/PROVIDERS TO ORDER:      Registered Dietitian Interventions:  Ensure Enlive daily    Future/Additional Recommendations:       REASON FOR ASSESSMENT  Positive admission nutrition risk screen    INFORMATION OBTAINED  Assessed patient in room.    NUTRITION HISTORY  Patient lives with her son and they share the cooking.  Patient eats 3 meals/day, does not drink protein drinks but likes her Mountain Dew.  Patient reports that her weight goes up and down about 4 lbs.    CURRENT NUTRITION ORDERS  Diet: Regular       CURRENT INTAKE/TOLERANCE  No breakfast ordered.   Patient ordered lunch today, reports the chicken was tough.      LABS  Nutrition-relevant labs: creatinine 1.34 (H), glucose 130 (H). Reviewed    MEDICATIONS  Nutrition-relevant medications: Reviewed    ANTHROPOMETRICS  Height: 0 cm (Data Unavailable)  Admission Weight: 60.5 kg (133 lb 6.1 oz) (03/21/25 2102)   Most Recent Weight: 60.5 kg (133 lb 6.1 oz) (03/21/25 2102)  BMI: Body mass index is 19.7 kg/m .   Weight History:  10/11/24 : 66.4 kg (146 lb 6.4 oz)   09/30/24 : 66.3 kg (146 lb 1.6 oz)   09/24/24 : 64.9 kg (143 lb)   09/20/24 : 68.3 kg (150 lb 8 oz) 11% loss in 6 months.   09/05/24 : 67 kg (147 lb 11.2 oz)   01/09/24 : 70.5 kg (155 lb 6.4 oz)     Dosing Weight: 60.5 kg, based on actual wt    ASSESSED NUTRITION NEEDS  Estimated Energy Needs: 1512+ kcals/day ( 25+kcal/kg )  Justification: Increased needs  Estimated Protein Needs: 61-72 grams protein/day (1 - 1.2 grams of pro/kg)  Justification: Increased needs  Estimated Fluid Needs: 1512+ mL/day (1 mL/kcal)  Justification: Maintenance    SYSTEM AND PHYSICAL FINDINGS    Per chart  GI symptoms:  Last BM preadmit  Skin/wounds: No open areas    MALNUTRITION  % Intake: No decreased intake noted  % Weight Loss: > 10% in 6 months (severe)   Subcutaneous Fat Loss: Orbital: Moderate  Muscle Loss: Clavicles (pectoralis and deltoids):  Moderate, Shoulders (deltoids): Moderate, and Interosseous muscles: Mild  Fluid Accumulation/Edema: None noted  Malnutrition Diagnosis: Severe malnutrition in the context of acute illness or injury  Malnutrition Present on Admission: Yes    NUTRITION DIAGNOSIS  Malnutrition (undernutrition) related to acute illness as evidenced by wt loss, loss of lean body mass    INTERVENTIONS  Medical food supplement therapy-Ensure Enlive daily for supplemental protein.    GOALS  Patient to consume % of nutritionally adequate meal trays TID, or the equivalent with supplements/snacks.     MONITORING/EVALUATION  Progress toward goals will be monitored and evaluated per policy.

## 2025-03-22 NOTE — PROGRESS NOTES
Hypotensive. Giving 500 mL fluid bolus. Some pulmonary crackles on exam but only needing 1L NC currently. Monitor respiratory status closely. Has only received 1L of fluids here thus far (last night).     Called son Juancarlos to confirm goals of care. Patient is full code, would want all medical cares.     Collette Landa MD

## 2025-03-22 NOTE — PLAN OF CARE
Goal Outcome Evaluation:    Problem: Infection  Goal: Absence of Infection Signs and Symptoms  Outcome: Progressing  Intervention: Prevent or Manage Infection     Problem: Gas Exchange Impaired  Goal: Optimal Gas Exchange  Outcome: Progressing  Intervention: Optimize Oxygenation and Ventilation      Patient was very lethargic and not responding much at beginning of shift. BP's were low in the 80's and 70's. Very fidgety as well. Updated resident and we did a 500 ml bolus of LR and BP improved to 90's and low 100's. IV abx given as ordered. Second IV placed by SWAT nurse. Pt became more alert and was able to answer questions appropriately. Ate about 50% of lunch. After a few hours BP dripped again into the 70's so another 500 ml bolus LR was given and BP's now back up in the 90's. Pt started out on Bipap this morning but now has been on 1 L NC sating in the 90's. Pt will occasionally drop into the 80's but just needs a reminder to breathe. On 1:1 due to fidgetiness and pulling off oxygen but going to trial off on evenings due to patient being more alert and oriented now x4. Will continue to monitor.     Sandra Whittaker RN

## 2025-03-22 NOTE — CODE/RAPID RESPONSE
Pt very agitated with decreased LOC; O2 sats 80's; HR in 140's; Increased o2 from 2L NC to 6L oxymask; called RT and rapid response; temp 102.9; new orders given after rapid; see notes for details on rapid response

## 2025-03-22 NOTE — H&P
[facility]    History and Physical - Hospitalist Service       Date of Admission:  3/21/2025    Assessment & Plan      Vy Dooley is a 74 year old female admitted on 3/21/2025. She has history of frequent UTIs, mild cognitive impairment, COPD, asthma, GERD, restless leg syndrome, depression and is admitted for altered mental status and UTI concerning for septic encephalopathy.    Altered mental status  Septic encephalopathy?  H/o mild cognitive impairment  Presented to ED for confusion, patient is a difficult historian and no family member immediately available. Per chart review, has been having confusion based on primary care notes dating back to January, and mild cognitive impairment. Lives at home with her son, normally takes care of herself. Unclear if altered mental status is related to progressive cognitive impairment/dementia undiagnosed, but septic encephalopathy is on differential due to urinary tract infection.  - Delirium precautions  - 1 to 1  - Treatment of UTI as below    Urinary Tract Infection, history of ESBL  Acute kidney injury  Elevated lactic acid, resolved  Increased urinary frequency without other signs/symptoms of UTI. Afebrile, BP within normal limits. WBC normal. Lactic acid initially elevated, improved with fluids. Mild creatinine increase from baseline of 0.85. With history of frequent UTIs, this may be colonized rather than true UTI given no other symptoms, however with history of ESBL agree with initiating treatment of meropenem while monitoring. Received fluid bolus in ED.  - Meropenem, q12h  - Strict I/Os  - Daily labs, monitor creatinine    Code Status  Previous code status full code. As patient AxO x2 and having linear thoughts, attempted to have code status conversation. Patient requesting no intubation but to do chest compressions, discussed how it is difficult to appropriately treat one without the other. Risks/benefits discussed, attempted to have  patient teach back risks/benefits which she was unable to fully do. No POLST or Advanced Directive available.   - Full code  - Ideally will get in contact with a family member/decision maker when possible    Falls at home  3x occurrences, last a few months ago, no reported history of hitting her head. Unsure if home is safe for her from a fall perspective.   - Fall precautions  - PT/OT  - Consider home eval  - Consider walker/cane at discharge    History of COPD, Asthma on home oxygen  Reportedly uses inhaler, unsure which one, not on her medication list. Has been without inhaler for 1-2 weeks. Reports using home oxygen, did not come in on oxygen, now on 2L in ED and breathing comfortably without wheezing on exam.   - Duonebs PRN  - Oxygen PRN            Diet: Combination Diet Regular Diet Adult  DVT Prophylaxis: Enoxaparin (Lovenox) SQ  Muniz Catheter: Not present  Lines: None     Cardiac Monitoring: None  Code Status: Full Code    Clinically Significant Risk Factors Present on Admission           # Hypocalcemia: Lowest Ca = 8.4 mg/dL in last 2 days, will monitor and replace as appropriate                       # Asthma: noted on problem list        Disposition Plan     Medically Ready for Discharge: Anticipated in 2-4 Days         The patient's care will be discussed with the Attending Physician, Dr. Hsu in the AM .    Luis Carlos Small MD  Hospitalist Service  [facility]  Securely message with [employer] (more info)  Text page via [employer] Paging/Directory     _____________________________________________________________________    Chief Complaint   Altered mental status    History is obtained from the patient    History of Present Illness   Vy Dooley is a 74 year old female who has PMH of COPD, Asthma, recurrent UTI who presented to the ED out of concern for altered mental status.     ED course: Arrived with concern of confusion. Found to have urinary tract infection based on  "UA. Vitals within normal limits. Small increase in creatinine, lactic acid was 2.3, down to 1.7 after fluids. WBC normal. Has history of ESBL on urinary cultures. Cultures were drawn, admitted to floor for UTI causing likely altered mental status.    She reports no concerns to me today. Denies confusion. Unsure why she is here. Doesn't feel like she is ill at all or needs to be here. Does admit to increased urinary frequency. Denies history of frequent UTIs. No dysuria, flank pain, chest pain, shortness of breath, lightheadedness, dizziness, fevers, chills, abdominal pain, cough or other URI symptoms. Upon further questioning, does say she has COPD and uses an inhaler at home, which she hasn't used in the last few weeks. Says she uses home oxygen, unsure how much, hasn't used it today.    Reports she lives at home with her son. Has been having falls lately, 3 total, last one was \"months ago\". Doesn't use a walker or cane. Doesn't think she ever hit her head.       Past Medical History    Past Medical History:   Diagnosis Date    COPD (chronic obstructive pulmonary disease) (H)        Past Surgical History   Past Surgical History:   Procedure Laterality Date    COLONOSCOPY  9/26/2011    Procedure:COLONOSCOPY; Colonoscopy  ; Surgeon:JACOB SCHULTZ; Location:[facility]    EYE SURGERY      PHACOEMULSIFICATION WITH STANDARD INTRAOCULAR LENS IMPLANT Left 12/18/2017    Procedure: PHACOEMULSIFICATION WITH STANDARD INTRAOCULAR LENS IMPLANT;  Left Cataract Removal with Implant;  Surgeon: James Melendez MD;  Location: [facility]    PHACOEMULSIFICATION WITH STANDARD INTRAOCULAR LENS IMPLANT Right 1/22/2018    Procedure: PHACOEMULSIFICATION WITH STANDARD INTRAOCULAR LENS IMPLANT;  Right Cataract Removal with Implant;  Surgeon: James Melendez MD;  Location: [facility]       Prior to Admission Medications   Prior to Admission Medications   Prescriptions Last Dose Informant Patient Reported? Taking?   omeprazole (PRILOSEC) " 40 MG DR capsule Unknown  Yes Yes   Sig: Take 40 mg by mouth daily.   rOPINIRole (REQUIP) 0.5 MG tablet 3/20/2025 Bedtime  Yes Yes   Sig: Take 3-4 tablets by mouth nightly as needed.   traZODone (DESYREL) 50 MG tablet Unknown  Yes Yes   Sig: Take 1-2 tablets by mouth nightly as needed for sleep.   vibegron (GEMTESA) 75 MG TABS tablet Unknown  Yes Yes   Sig: Take 75 mg by mouth daily.      Facility-Administered Medications: None           Physical Exam   Vital Signs: Temp: (!) 101.5  F (38.6  C) Temp src: Oral BP: 130/82 Pulse: 88   Resp: 22 SpO2: 96 % O2 Device: BiPAP/CPAP Oxygen Delivery: 2 LPM  Weight: 133 lbs 6.05 oz    General: No acute distress. Pleasant.   Skin: Warm, dry, intact no rashes or lesions.  HEENT: Normocephalic, atraumatic. Sclera non-icteric. EOMI. Moist mucous membranes, good dentition. No JVD or hepatojugular reflex.  Cardiac: RRR without murmurs, gallops. Normal S1 and S2.  Respiratory: Normal work of breathing. Clear to auscultation bilaterally. No wheezing.   Abdominal: Soft, non-tender throughout including suprapubic region without distention. Bowel sounds present. No masses, hepatosplenomegaly.   Extremities: No edema. Atraumatic.  Neurological: Awake, A&Ox2 with normal speech. Cranial nerves are intact. Moves all extremities, no gross strength deficits. Unable to complete appropriate teach back of risk/benefits of health care treatment options, non-decisional.  Psychiatric: Appropriate mood and affect. Poor insight/judgement. Linear thoughts.         Medical Decision Making             Data

## 2025-03-22 NOTE — PROVIDER NOTIFICATION
Lab called again at 1043 with another positive blood culture from yesterday 3/21 at 5:20pm showing ecoli.     Updated MD Landa at 1044 and she noted at 1051.     No changes at this time.     Sandra Whittaker RN 3/22/2025 11:12 AM

## 2025-03-22 NOTE — TREATMENT PLAN
[facility]AT Treatment Plan    Patient Score: 7  Patient Acuity: 4    Clinical Indication for Therapy: history of bronchospasm, home regimen, history of mucous producing disease, and prevent atelectasis    Therapy Ordered: Flutter valve BID, CPAP PRN, Duoneb BID, Albuterol MDI Q6RN    Assessment Summary: Upon assessment, patient on 2L nasal cannula with an SPO2 of 94%. BS and RR WNL. Patient states that she uses inhalers at home as needed for her breathing and oxygen at one liter as needed, but hasn't used it in a long time. Patient a little altered, so possible not the most reliable historian at this time. Ordered changed to BID with an Q6PRN albuterol inhaler in case of wheezing and/or shortness of breath between scheduled neb treatments. CPAP is in room on standby to be used as needed if patient becomes hypoxic. Please see flowsheets and imaging for further details.     Navya Steiner, RT  3/22/2025

## 2025-03-22 NOTE — PROGRESS NOTES
[facility]    Progress Note - Hospitalist Service       Date of Admission:  3/21/2025    Assessment & Plan   Vy Dooley is a 74 year old female admitted on 3/21/2025. She has history of frequent UTIs, mild cognitive impairment, COPD, asthma, GERD, restless leg syndrome, depression and is admitted for E coli bacteremia with urinary source and septic encephalopathy.    Urosepsis   Gram negative bacteremia   Urinary Tract Infection, history of ESBL  Septic encephalopathy  H/o mild cognitive impairment  Presented to ED for confusion. Patient is a difficult historian and no family member immediately available. Increased urinary frequency but ROS otherwise unrevealing. History of frequent UTIs, history of ESBL E coli. Vitals initially stable. Overnight 3/21, became septic with fever, tachycardia, hypoxia. UA suggestive of infection. Urine culture with gram negative bacilli. Blood culture with E coli (1 of 2). Clinical picture consistent with E coli bacteremia with urinary source and septic encephalopathy. Hypotensive 3/22 AM, responded well to fluid bolus. Now awake and tolerating PO.   - Meropenem q12h  - Daily CBC, BMP  - Monitor vitals closely   - Full code     FLO  Cr 1.34 this morning, up from 1.1 on admission. Baseline about 0.9. Likely 2/2 dehydration, sepsis.   - S/p IVF bolus 3/22 AM  - Continue to monitor BMP daily     Goals of care   Per son Juancarlos, patient is full code. Would want all medical care including pressors, ICU, intubation, resuscitation.   - Full code    Falls at home  3x occurrences, last a few months ago, no reported history of hitting her head. Unsure if home is safe for her from a fall perspective.   - Fall precautions  - PT/OT  - Consider home eval  - Consider walker/cane at discharge    History of COPD, Asthma on home oxygen  Reportedly uses inhaler, unsure which one, not on her medication list. Has been without inhaler for 1-2 weeks. Reports using home  oxygen, did not come in on oxygen, now on 2L in ED and breathing comfortably without wheezing on exam.   - Duonebs PRN  - Oxygen PRN           Diet: Combination Diet Regular Diet Adult    DVT Prophylaxis: Enoxaparin (Lovenox) SQ  Muniz Catheter: Not present  Fluids: PO   Lines: None     Cardiac Monitoring: None  Code Status: Full Code  - confirmed with son 3/22 AM     Clinically Significant Risk Factors Present on Admission           # Hypocalcemia: Lowest Ca = 7.9 mg/dL in last 2 days, will monitor and replace as appropriate                       # Asthma: noted on problem list        Social Drivers of Health   Housing Stability: High Risk (3/21/2025)    Housing Stability     Do you have housing? : No   Tobacco Use: Medium Risk (10/11/2024)    Received from [facility]    Patient History     Smoking Tobacco Use: Former     Smokeless Tobacco Use: Never         Disposition Plan     Medically Ready for Discharge: Anticipated in 2-4 Days         The patient's care was discussed with the Attending Physician, Dr. Hsu .    Collette Landa MD  Hospitalist Service  [facility]  Securely message with [employer] (more info)  Text page via [employer] Paging/Directory   ______________________________________________________________________    Interval History   Rapid response called for acute tachycardia and hypoxia. Due to sepsis. Improved with BIPAP overnight. Now on 1L NC.     This morning, patient became hypotensive to 70s/40s. Responded well to a 500 mL bolus. Now 100s/60s.     Patient was initially very somnolent and only awakening to sternal rub while hypotensive. After hypotension resolved, she was awake and asking for breakfast and knew her name but not oriented to place/time/situation. She denied pain or discomfort.     Called patient's son during hypotensive episode. Confirmed full code and would be agreeable to ICU/pressors if needed.     Physical Exam   Vital  Signs: Temp: 98.2  F (36.8  C) Temp src: Axillary BP: 94/54 Pulse: 61   Resp: 16 SpO2: 93 % O2 Device: Nasal cannula Oxygen Delivery: 1 LPM  Weight: 133 lbs 6.05 oz    GEN: patient was initially very somnolent and only awakening to sternal rub while hypotensive. After hypotension resolved, she was awake and asking for breakfast and knew her name but not oriented to place/time/situation.   HEENT: Normocephalic, atraumatic. Anicteric sclera. Dry mucous membranes.   PULM: Non-labored breathing. No use of accessory muscles. Course upper airway sounds in anterior lung fields with no wheezing or focal crackles.   CV: Regular rate and rhythm. Normal S1, S2. No rubs, murmurs, or gallops.    ABDOMEN: Normoactive bowel sounds. Non-tender to palpation. Non-distended.    NEURO:  patient was initially very somnolent and only awakening to sternal rub while hypotensive. After hypotension resolved, she was awake and asking for breakfast and knew her name but not oriented to place/time/situation.   PSYCH: Calm.     Medical Decision Making       Please see A&P for additional details of medical decision making.      Data   ------------------------- PAST 24 HR DATA REVIEWED -----------------------------------------------    I have personally reviewed the following data over the past 24 hrs:    8.2  \   11.4 (L)   / 154     137 106 26.4 (H) /  130 (H)   3.4 21 (L) 1.34 (H) \     ALT: 14 AST: 40 AP: 70 TBILI: 1.1   ALB: 3.8 TOT PROTEIN: 6.9 LIPASE: N/A     Trop: 66 (H) BNP: N/A     Procal: N/A CRP: N/A Lactic Acid: 1.2         Imaging results reviewed over the past 24 hrs:   Recent Results (from the past 24 hours)   XR Chest Port 1 View    Narrative    EXAM: XR CHEST PORT 1 VIEW  LOCATION: [facility]  DATE: 3/21/2025    INDICATION: sepsis  COMPARISON: None.      Impression    IMPRESSION: Negative chest.   XR Chest Port 1 View    Narrative    EXAM: XR CHEST PORT 1 VIEW  LOCATION: [facility]  DATE: 3/21/2025    INDICATION: acute hypoxia  COMPARISON: None.      Impression    IMPRESSION: Negative chest.

## 2025-03-22 NOTE — PROGRESS NOTES
Patient placed on BIPAP for WOB and O2 during rapid. Patient's volumes were very high on Bipap settings, changed to CPAP 10cm H2O and oxygen weaned down to 30% FiO2. Patient is currently resting comfortably on the CPAP O2 in high 90's. Weaned O2 to 25% FiO2.     Most recent VBG   7.31/46/27/23    RT will continue to monitor.

## 2025-03-22 NOTE — ED NOTES
[facility] ED Handoff Report    ED Chief Complaint: Altered mental status.    ED Diagnosis:  (G93.40) Acute encephalopathy  Comment:   Plan: Currently on a 1:1 as patient was restless.    (R50.9) Fever, unspecified fever cause  Comment:   Plan: Blood cultures done. Tylenol and antibiotics given as prescribed.    (R09.02) Hypoxia  Comment:   Plan: On oxygen at 2L via nasal cannula.    (N39.0) Urinary tract infection without hematuria, site unspecified  Comment:   Plan: Antibiotics given as prescribed.       PMH:    Past Medical History:   Diagnosis Date    COPD (chronic obstructive pulmonary disease) (H)         Code Status:  Prior     Falls Risk: Yes Band: Applied    Current Living Situation/Residence: lives with their son or daughter     Elimination Status: Continent: Yes     Activity Level: 2 assist    Patients Preferred Language:  English     Needed: No    Vital Signs:  /62   Pulse 86   Temp 100.8  F (38.2  C) (Oral)   Resp 22   SpO2 94%      Cardiac Rhythm:     Pain Score: 0/10    Is the Patient Confused:  No    Last Food or Drink: 03/21/25 at 2000    Focused Assessment:  Altered mental status.    Tests Performed: Done: Labs and Imaging    Treatments Provided:  Fluids and antibiotics.    Family Dynamics/Concerns: No    Family Updated On Visitor Policy: No    Plan of Care Communicated to Family: No    Who Was Updated about Plan of Care:      Belongings Checklist Done and Signed by Patient: N/A    Belongings Sent with Patient:     Medications sent with patient:     Covid: symptomatic, negative    Additional Information: Patient lives with the son in a private home. Was restless when arrived earlier but now patient is calm, cooperative, and responsive. Passed the dysphagia screen and had apple sauce.    RN: Lalo Carlos   3/21/2025 8:16 PM

## 2025-03-22 NOTE — PROVIDER NOTIFICATION
Lab called with critical blood culture from 3/21 at 5:20 pm showing gram negative bacilli at 0837.     Updated MD Small at 0838 and she responded at 0841 that we will just continue with the IV meropenem.     Sandra Whittaker RN 3/22/2025

## 2025-03-22 NOTE — SIGNIFICANT EVENT
Significant Event Note    Time of event: 11:56 PM March 21, 2025    Description of event:  House officer responded to overhead rapid response. Patient was admitted overnight for concern of UTI and altered mental status, I personally did the admission, see that note for details prior to rapid response. Rapid called for desaturation to 80s requiring 15L oxymask and need for additional support on top of that, as well as tachycardia to 150s.     Upon arrival, patient very physically agitated in bed. Oxymask on. Vitals tachycardic to 150s as above, BP mildly hypertensive, found to be febrile to 101.5 and then 102.9 later. EKG ordered, appeared sinus tachycardia with bifascicular block, change from previous being only the tachycardia. She was placed on BiPAP with good o2 sat response. As noted in HPI, she has altered mental status and is a difficult historian, can answer yes/no questions.  She denies chest pain, lightheadedness, dizziness, shortness of breath.  Denied anxiety and history of panic attacks.  No symptoms that bother her.  Did not give us any inclination as to why she was being agitated.  Labs were drawn including PE, troponin, initial VBG prior to BiPAP initiation, and CBC.  Chest x-ray obtained, results negative.  She was given a dose of IV for her fevers mild FLO and expect him slight creatinine bump from this.  She was also given a Tylenol suppository, had bowel movement shortly after that requiring another placement.  Temperatures did not improve with those measures.  Had Pilar hugger was used with airflow and we do not help cool her, and idea from NEHA Becerra, appreciate the help.  She was given a dose of hydroxyzine to help with anxiety.  Tachycardia could be potentially from her known UTI progressing to a sepsis picture, the fever would make sense with this.  However the acute change of shortness of breath does not align with this. CT PE was considered to rule out pulmonary emboli, we opted to monitor  "vital signs and status as we treat her fevers.  Over time, her heart rate slowed from 150, to 120, now 90s while at rest.  BiPAP was weaned to CPAP, and have since been decrease in the oxygen requirements via CPAP.  Temperatures improved, no 98 degrees.  Patient resting more comfortably.  Elevation of troponin from 29-71 does raise concerns for acute coronary syndrome, however denial of chest pain, subjective shortness of breath and the EKG bifascicular block being unchanged from previous with exception to tachycardia is reassuring.  Lactic acid was 2.7, up from 1.7 though was 2.3 at admission.  Normal blood pressures is reassuring.    Plan:  -Trend troponin, repeat 2 hours after previous  - Repeat lactate at that same time  - EKG at time of second troponin  - Continue current cares, antibiotics, \" reverse\" Pilar hugger  - Wean O2 as able    Discussed with: bedside nurse    Luis Carlos Small MD, PGY1  [facility]    "

## 2025-03-22 NOTE — PLAN OF CARE
Problem: Gas Exchange Impaired  Goal: Optimal Gas Exchange  Outcome: Progressing  Intervention: Optimize Oxygenation and Ventilation  Recent Flowsheet Documentation  Taken 3/22/2025 0449 by Nirali Reed RN  Head of Bed (HOB) Positioning: HOB at 30 degrees  Taken 3/22/2025 0300 by Nirali Reed RN  Head of Bed (HOB) Positioning: HOB at 30 degrees     Problem: Infection  Goal: Absence of Infection Signs and Symptoms  Outcome: Progressing   Goal Outcome Evaluation:    Patient was A/O x1, rapid decline from A/O x3 at beginning of shift; rapid response called, see previous notes re: rapid. Flagged for sepsis, provider aware; Temp was 102.9, currently 98.3. Plan on continuing to monitor respiratory status and assess.  Pt very fidgety NOC shift with intermittent short periods of rest; cognition and alertness returned to same level as beginning of shift

## 2025-03-23 ENCOUNTER — APPOINTMENT (OUTPATIENT)
Dept: OCCUPATIONAL THERAPY | Facility: HOSPITAL | Age: 75
End: 2025-03-23
Payer: COMMERCIAL

## 2025-03-23 ENCOUNTER — APPOINTMENT (OUTPATIENT)
Dept: PHYSICAL THERAPY | Facility: HOSPITAL | Age: 75
End: 2025-03-23
Payer: COMMERCIAL

## 2025-03-23 LAB
ANION GAP SERPL CALCULATED.3IONS-SCNC: 11 MMOL/L (ref 7–15)
BACTERIA UR CULT: ABNORMAL
BUN SERPL-MCNC: 37.1 MG/DL (ref 8–23)
CALCIUM SERPL-MCNC: 8.3 MG/DL (ref 8.8–10.4)
CHLORIDE SERPL-SCNC: 108 MMOL/L (ref 98–107)
CREAT SERPL-MCNC: 1.16 MG/DL (ref 0.51–0.95)
EGFRCR SERPLBLD CKD-EPI 2021: 50 ML/MIN/1.73M2
ERYTHROCYTE [DISTWIDTH] IN BLOOD BY AUTOMATED COUNT: 13.1 % (ref 10–15)
GLUCOSE SERPL-MCNC: 107 MG/DL (ref 70–99)
HCO3 SERPL-SCNC: 21 MMOL/L (ref 22–29)
HCT VFR BLD AUTO: 36.1 % (ref 35–47)
HGB BLD-MCNC: 11.5 G/DL (ref 11.7–15.7)
MCH RBC QN AUTO: 31.9 PG (ref 26.5–33)
MCHC RBC AUTO-ENTMCNC: 31.9 G/DL (ref 31.5–36.5)
MCV RBC AUTO: 100 FL (ref 78–100)
PLATELET # BLD AUTO: 147 10E3/UL (ref 150–450)
POTASSIUM SERPL-SCNC: 3.9 MMOL/L (ref 3.4–5.3)
RBC # BLD AUTO: 3.61 10E6/UL (ref 3.8–5.2)
SODIUM SERPL-SCNC: 140 MMOL/L (ref 135–145)
WBC # BLD AUTO: 7.7 10E3/UL (ref 4–11)

## 2025-03-23 PROCEDURE — 97535 SELF CARE MNGMENT TRAINING: CPT | Mod: GO

## 2025-03-23 PROCEDURE — 85027 COMPLETE CBC AUTOMATED: CPT

## 2025-03-23 PROCEDURE — 250N000011 HC RX IP 250 OP 636

## 2025-03-23 PROCEDURE — 120N000001 HC R&B MED SURG/OB

## 2025-03-23 PROCEDURE — 250N000009 HC RX 250: Performed by: FAMILY MEDICINE

## 2025-03-23 PROCEDURE — 97166 OT EVAL MOD COMPLEX 45 MIN: CPT | Mod: GO

## 2025-03-23 PROCEDURE — 36415 COLL VENOUS BLD VENIPUNCTURE: CPT

## 2025-03-23 PROCEDURE — 258N000003 HC RX IP 258 OP 636

## 2025-03-23 PROCEDURE — 80048 BASIC METABOLIC PNL TOTAL CA: CPT

## 2025-03-23 PROCEDURE — 94640 AIRWAY INHALATION TREATMENT: CPT | Mod: 76

## 2025-03-23 PROCEDURE — 97110 THERAPEUTIC EXERCISES: CPT | Mod: GP

## 2025-03-23 PROCEDURE — 999N000157 HC STATISTIC RCP TIME EA 10 MIN

## 2025-03-23 PROCEDURE — 250N000013 HC RX MED GY IP 250 OP 250 PS 637: Performed by: FAMILY MEDICINE

## 2025-03-23 PROCEDURE — 99233 SBSQ HOSP IP/OBS HIGH 50: CPT | Mod: GC | Performed by: FAMILY MEDICINE

## 2025-03-23 PROCEDURE — 97161 PT EVAL LOW COMPLEX 20 MIN: CPT | Mod: GP

## 2025-03-23 RX ORDER — CEFTRIAXONE 2 G/1
2 INJECTION, POWDER, FOR SOLUTION INTRAMUSCULAR; INTRAVENOUS EVERY 24 HOURS
Status: DISCONTINUED | OUTPATIENT
Start: 2025-03-23 | End: 2025-03-25

## 2025-03-23 RX ORDER — SODIUM CHLORIDE 9 MG/ML
INJECTION, SOLUTION INTRAVENOUS CONTINUOUS
Status: DISCONTINUED | OUTPATIENT
Start: 2025-03-23 | End: 2025-03-25

## 2025-03-23 RX ORDER — CEFTRIAXONE 2 G/1
2 INJECTION, POWDER, FOR SOLUTION INTRAMUSCULAR; INTRAVENOUS EVERY 24 HOURS
Status: DISCONTINUED | OUTPATIENT
Start: 2025-03-23 | End: 2025-03-23

## 2025-03-23 RX ADMIN — SODIUM CHLORIDE: 0.9 INJECTION, SOLUTION INTRAVENOUS at 13:25

## 2025-03-23 RX ADMIN — ENOXAPARIN SODIUM 40 MG: 40 INJECTION SUBCUTANEOUS at 21:08

## 2025-03-23 RX ADMIN — IPRATROPIUM BROMIDE AND ALBUTEROL SULFATE 3 ML: .5; 3 SOLUTION RESPIRATORY (INHALATION) at 19:13

## 2025-03-23 RX ADMIN — IPRATROPIUM BROMIDE AND ALBUTEROL SULFATE 3 ML: .5; 3 SOLUTION RESPIRATORY (INHALATION) at 08:36

## 2025-03-23 RX ADMIN — PANTOPRAZOLE SODIUM 40 MG: 40 TABLET, DELAYED RELEASE ORAL at 08:36

## 2025-03-23 RX ADMIN — MEROPENEM 1 G: 1 INJECTION, POWDER, FOR SOLUTION INTRAVENOUS at 08:36

## 2025-03-23 RX ADMIN — CEFTRIAXONE SODIUM 2 G: 2 INJECTION, POWDER, FOR SOLUTION INTRAMUSCULAR; INTRAVENOUS at 17:55

## 2025-03-23 RX ADMIN — SODIUM CHLORIDE, SODIUM LACTATE, POTASSIUM CHLORIDE, AND CALCIUM CHLORIDE: .6; .31; .03; .02 INJECTION, SOLUTION INTRAVENOUS at 09:02

## 2025-03-23 RX ADMIN — SODIUM CHLORIDE, SODIUM LACTATE, POTASSIUM CHLORIDE, AND CALCIUM CHLORIDE: .6; .31; .03; .02 INJECTION, SOLUTION INTRAVENOUS at 00:22

## 2025-03-23 ASSESSMENT — ACTIVITIES OF DAILY LIVING (ADL)
ADLS_ACUITY_SCORE: 45
ADLS_ACUITY_SCORE: 45
ADLS_ACUITY_SCORE: 44
ADLS_ACUITY_SCORE: 45
ADLS_ACUITY_SCORE: 44
ADLS_ACUITY_SCORE: 50
ADLS_ACUITY_SCORE: 40
ADLS_ACUITY_SCORE: 45
ADLS_ACUITY_SCORE: 50
ADLS_ACUITY_SCORE: 45
ADLS_ACUITY_SCORE: 44
ADLS_ACUITY_SCORE: 45
ADLS_ACUITY_SCORE: 40
ADLS_ACUITY_SCORE: 40
ADLS_ACUITY_SCORE: 50
ADLS_ACUITY_SCORE: 44
ADLS_ACUITY_SCORE: 50
ADLS_ACUITY_SCORE: 44
ADLS_ACUITY_SCORE: 50
ADLS_ACUITY_SCORE: 45
ADLS_ACUITY_SCORE: 40

## 2025-03-23 NOTE — PROGRESS NOTES
03/23/25 0955   Appointment Info   Signing Clinician's Name / Credentials (PT) Muna Pond PT   Living Environment   People in Home child(reba), adult  (son and his significant other)   Current Living Arrangements house   Home Accessibility stairs to enter home;stairs within home   Number of Stairs, Main Entrance 1   Stair Railings, Main Entrance none   Number of Stairs, Within Home, Primary greater than 10 stairs   Stair Railings, Within Home, Primary railings safe and in good condition   Transportation Anticipated family or friend will provide   Living Environment Comments bedroom upstairs; laundry in basement   Self-Care   Equipment Currently Used at Home cane, straight;shower chair   Activity/Exercise/Self-Care Comment ambulates without AD in home; home O2; independent ADL's; son assist with IADL's   General Information   Onset of Illness/Injury or Date of Surgery 03/21/25   Referring Physician Dr. Hsu   Patient/Family Therapy Goals Statement (PT) none stated   Pertinent History of Current Problem (include personal factors and/or comorbidities that impact the POC) sepsis, UTI, cognitive impairment, falls   Existing Precautions/Restrictions fall;oxygen therapy device and L/min  (O2 3L NC)   Cognition   Orientation Status (Cognition) oriented to;person;place;situation;disoriented to;time   Follows Commands (Cognition) WFL   Range of Motion (ROM)   Range of Motion ROM is WFL   Strength (Manual Muscle Testing)   Strength (Manual Muscle Testing) Deficits observed during functional mobility   Bed Mobility   Bed Mobility supine-sit   Supine-Sit [place] (Bed Mobility) supervision   Assistive Device (Bed Mobility) bed rails   Transfers   Transfers sit-stand transfer   Sit-Stand Transfer   Sit-Stand [place] (Transfers) contact guard;verbal cues;nonverbal cues (demo/gesture)   Assistive Device (Sit-Stand Transfers) walker, front-wheeled   Gait/Stairs (Locomotion)   [place] Level (Gait) minimum assist  (75% patient effort);verbal cues   Assistive Device (Gait) walker, front-wheeled;other (see comments)  (O2 3L NC)   Distance in Feet (Gait) 60   Pattern (Gait) step-through   Deviations/Abnormal Patterns (Gait) base of support, narrow;scissoring;other (see comments)  (one lat. LOB with min assist recovery)   Comment, (Gait/Stairs) unable to attempt stairs due to decreased activity tolerance and unsteadiness with gait   Balance   Balance Comments min assist standing without UE support, post. LOB   Clinical Impression   Criteria for Skilled Therapeutic Intervention Yes, treatment indicated   PT Diagnosis (PT) impaired functional mobility   Influenced by the following impairments decreased strength, balance, cognition, activity tolerance   Functional limitations due to impairments transfers, gait, stairs   Clinical Presentation (PT Evaluation Complexity) stable   Clinical Presentation Rationale pt presents as medically diagnosed   Clinical Decision Making (Complexity) low complexity   Planned Therapy Interventions (PT) balance training;bed mobility training;gait training;home exercise program;neuromuscular re-education;patient/family education;stair training;strengthening;stretching;transfer training   Risk & Benefits of therapy have been explained evaluation/treatment results reviewed;patient   PT Total Evaluation Time   PT Eval, Low Complexity Minutes (86649) 15   Physical Therapy Goals   PT Frequency Daily   PT Predicted Duration/Target Date for Goal Attainment 03/30/25   PT Goals Transfers;Gait;Stairs   PT: Transfers Modified independent;Assistive device;Bed to/from chair;Sit to/from stand   PT: Gait Supervision/stand-by assist;Assistive device;150 feet   PT: Stairs Supervision/stand-by assist;Greater than 10 stairs;Rail on left;Rail on right   Interventions   Interventions Quick Adds Therapeutic Procedure   Therapeutic Procedure/Exercise   Ther. Procedure: strength, endurance, ROM, flexibillity Minutes (28674) 15    Symptoms Noted During/After Treatment fatigue   Treatment Detail/Skilled Intervention supine BLE ex x5 reps and seated BLE ex x10 reps with sba cuing and demonstration for technique   PT Discharge Planning   PT Plan bring 4WW and O2 for trial; standing balance and LE ex; stairs when ready   PT Discharge Recommendation (DC Rec) Transitional Care Facility   PT Rationale for DC Rec pt is high risk of falling and needs assist of 1 for safe ambulation at this time; continue with PT for balance, strengthening, and mobility safety   PT Brief overview of current status amb. 60 feet with walker min assist   PT Total Distance Amb During Session (feet) 60   PT Equipment Needed at Discharge walker, rolling  (if home, needs walker at d/c)   Physical Therapy Time and Intention   Timed Code Treatment Minutes 15   Total Session Time (sum of timed and untimed services) 30

## 2025-03-23 NOTE — PLAN OF CARE
Goal Outcome Evaluation:      Plan of Care Reviewed With: patient    Overall Patient Progress: improving    Problem: Infection  Goal: Absence of Infection Signs and Symptoms  Outcome: Progressing  Intervention: Prevent or Manage Infection    Problem: Gas Exchange Impaired  Goal: Optimal Gas Exchange  Outcome: Progressing  Intervention: Optimize Oxygenation and Ventilation     Patient alert and oriented x4 but does have forgetfulness, intermittent confusion at times. Cooperative but is restless/fidgety when she is awake. IV fluids and abx given per orders. BP's have been WNL-134/60 & 99/51. Up walking in hallway with therapy with assist of one with walker and gait belt. Sat in chair for a couple of hours. On 3 L oxygen, lung sounds diminished. Purewick in place. Denies pain. Alarms in place as patient does not always call appropriately. Will continue to monitor.     Sandra Whittaker RN 3/23/2025 2:24 PM

## 2025-03-23 NOTE — PLAN OF CARE
Problem: Adult Inpatient Plan of Care  Goal: Optimal Comfort and Wellbeing  Outcome: Progressing  Intervention: Provide Person-Centered Care  Recent Flowsheet Documentation  Taken 3/22/2025 1600 by Marissa Salazar RN  Trust Relationship/Rapport:   care explained   choices provided   Goal Outcome Evaluation:    Pt alert and oriented x4. Explained cares and  Cluster cares to allow time to rest. Assisted pt with ordering food and encouraged fluids. Vitals stable. Pain in head managed with prn Tylenol.

## 2025-03-23 NOTE — PROGRESS NOTES
[facility]    Progress Note - Hospitalist Service       Date of Admission:  3/21/2025    Assessment & Plan   Vy Dooley is a 74 year old female admitted on 3/21/2025. She has history of frequent UTIs, mild cognitive impairment, COPD, asthma, GERD, restless leg syndrome, depression and is admitted for E coli bacteremia with urinary source and septic encephalopathy.     Urosepsis   Gram negative bacteremia   Urinary Tract Infection, history of ESBL  Septic encephalopathy  H/o mild cognitive impairment  Presented to ED for confusion. Increased urinary frequency but ROS otherwise unrevealing. History of frequent UTIs, history of ESBL E coli. Per her sister, she often gets very confused when she gets them.  Vitals initially stable. Overnight 3/21, became septic with fever, tachycardia, hypoxia. UA suggestive of infection. Urine culture with gram negative bacilli. Blood culture with E coli (1 of 2). Clinical picture consistent with E coli bacteremia with urinary source and septic encephalopathy. Hypotensive 3/22 AM, responded well to fluid bolus. Now awake and tolerating PO.   - Meropenem q12h through 3/23, cultures back pan-susceptible and transitioned to ceftriaxone on 3/23  - Daily CBC, BMP  - Monitor vitals closely   - Full code      FLO  Cr 1.34 this morning, up from 1.1 on admission. Baseline about 0.9. Likely 2/2 dehydration, sepsis.   - S/p IVF bolus 3/22 AM  - Continue to monitor BMP daily      Goals of care   Per son Juancarlos, patient is full code. Would want all medical care including pressors, ICU, intubation, resuscitation.   Per sister Grace, Vy has had difficulty taking care of herself, she lives with her son who has said he will take care of her but he sometimes is unable to provide the help she needs.  She has frequent UTIs and often gets very confused when she gets them.  Vy has refused prison before.  - Full code     Falls at home  3x occurrences, last a few  months ago, no reported history of hitting her head. Unsure if home is safe for her from a fall perspective.  PT recommending TCU.  - Fall precautions  - PT/OT  - Consider home eval  - Consider walker/cane at discharge     History of COPD, Asthma on home oxygen  Reportedly uses inhaler, unsure which one, not on her medication list. Has been without inhaler for 1-2 weeks. Reports using home oxygen, did not come in on oxygen, now on 2L in ED and breathing comfortably without wheezing on exam.   - Duonebs PRN  - Oxygen PRN        Diet: Combination Diet Regular Diet Adult  Snacks/Supplements Adult: Ensure Enlive; Between Meals    DVT Prophylaxis: Enoxaparin (Lovenox) SQ  Muniz Catheter: Not present  Fluids: po  Lines: None     Cardiac Monitoring: None  Code Status: Full Code      Clinically Significant Risk Factors          # Hyperchloremia: Highest Cl = 108 mmol/L in last 2 days, will monitor as appropriate      # Hypocalcemia: Lowest Ca = 7.9 mg/dL in last 2 days, will monitor and replace as appropriate                     # Severe Malnutrition: based on nutrition assessment and treatment provided per dietitian's recommendations., PRESENT ON ADMISSION   # Asthma: noted on problem list        Social Drivers of Health   Housing Stability: High Risk (3/21/2025)    Housing Stability     Do you have housing? : No   Tobacco Use: Medium Risk (10/11/2024)    Received from [facility]    Patient History     Smoking Tobacco Use: Former     Smokeless Tobacco Use: Never         Disposition Plan     Medically Ready for Discharge: Anticipated in 2-4 Days       The patient's care was discussed with the Attending Physician, Dr. Hsu .    Ray Mccauley MD  Hospitalist Service  [facility]  Securely message with Chris (more info)  Text page via [facility] Paging/Directory   ______________________________________________________________________    Interval History      ROSALBA  Per overnight nursing, Pt A&Ox4 beginning of shift. Pt disoriented to time and situation later in shift. On 3L nc overnight. Forgetful, kept forgetting external catheter was in place.   PT recommending TCU    Physical Exam   Vital Signs: Temp: 97.9  F (36.6  C) Temp src: Oral BP: 99/51 Pulse: 89   Resp: 18 SpO2: 96 % O2 Device: Nasal cannula Oxygen Delivery: 3 LPM  Weight: 133 lbs 6.05 oz    GENERAL: healthy, alert and no distress  RESP: lungs clear, speaking in full sentences, normal work of breathing   CV: heart sounds faint, extremities appear well perfused  ABDOMEN: soft, nontender  MS: no gross musculoskeletal defects noted, no edema  PSYCH: mentation appears normal, affect normal/bright       Data     I have personally reviewed the following data over the past 24 hrs:    7.7  \   11.5 (L)   / 147 (L)     140 108 (H) 37.1 (H) /  107 (H)   3.9 21 (L) 1.16 (H) \       Imaging results reviewed over the past 24 hrs:   No results found for this or any previous visit (from the past 24 hours).

## 2025-03-23 NOTE — PROGRESS NOTES
"Occupational Therapy     03/23/25 1055   Appointment Info   Signing Clinician's Name / Credentials (OT) Yulia Davis OTR/MELIZA   Living Environment   People in Home child(reba), adult;other (see comments)  (son and his significant other)   Current Living Arrangements house   Home Accessibility stairs to enter home;stairs within home   Number of Stairs, Within Home, Primary greater than 10 stairs   Stair Railings, Within Home, Primary railings safe and in good condition   Living Environment Comments bedroom upstairs. Bathroom setup: tub/shower with grab bars and shower chair; RTS with vanity nearby   Self-Care   Usual Activity Tolerance good   Current Activity Tolerance moderate   Equipment Currently Used at Home cane, straight;shower chair   Fall history within last six months yes   Number of times patient has fallen within last six months 4   Activity/Exercise/Self-Care Comment Pt reports indep with ADLs   Instrumental Activities of Daily Living (IADL)   IADL Comments Assist from son and his significant other   General Information   Onset of Illness/Injury or Date of Surgery 03/21/25   Referring Physician Triny Hsu   Additional Occupational Profile Info/Pertinent History of Current Problem Per chart: \"74 year old female admitted on 3/21/2025. She has history of frequent UTIs, mild cognitive impairment, COPD, asthma, GERD, restless leg syndrome, depression and is admitted for altered mental status and UTI concerning for septic encephalopathy.\"   Existing Precautions/Restrictions fall;oxygen therapy device and L/min  (3 L NC)   General Observations and Info Pt reports she is on on 2-4L O2 at home   Cognitive Status Examination   Orientation Status orientation to person, place and time   Affect/Mental Status (Cognitive) other (see comments)  (Cog impairment at baseline)   Follows Commands repetition of directions required;verbal cues/prompting required;physical/tactile prompts required   Cognitive Status Comments " Patient assisted up to bedside chair assist x 1 with walker; patient ambulated 10 ft in room and back to chair; pt tolerated well  Call light within reach; denies needs at this time  decr insight, safety awareness and problem solving   Range of Motion Comprehensive   General Range of Motion bilateral upper extremity ROM WFL   Strength Comprehensive (MMT)   Comment, General Manual Muscle Testing (MMT) Assessment generalized weakness overall   Transfers   Transfers toilet transfer;sit-stand transfer   Sit-Stand Transfer   Sit-Stand [place] (Transfers) minimum assist (75% patient effort)   Assistive Device (Sit-Stand Transfers) walker, front-wheeled   Toilet Transfer   Type (Toilet Transfer) sit-stand;stand-sit   [place] Level (Toilet Transfer) minimum assist (75% patient effort)   Assistive Device (Toilet Transfer) raised toilet seat;walker, front-wheeled   Activities of Daily Living   BADL Assessment/Intervention toileting;grooming;lower body dressing   Lower Body Dressing Assessment/Training   Position (Lower Body Dressing) supported sitting   [place] Level (Lower Body Dressing) lower body dressing skills;supervision   Grooming Assessment/Training   [place] Level (Grooming) grooming skills;contact guard assist   Comment, (Grooming) standing at sink   Toileting   [place] Level (Toileting) toileting skills;contact guard assist   Clinical Impression   Criteria for Skilled Therapeutic Interventions Met (OT) Yes, treatment indicated   OT Diagnosis Impaired ADLs   OT Problem List-Impairments impacting ADL problems related to;activity tolerance impaired;balance;cognition;mobility;strength   Assessment of Occupational Performance 3-5 Performance Deficits   Identified Performance Deficits TB dressing, toileting, g/h, bathing, fxl transfers   Planned Therapy Interventions (OT) ADL retraining;strengthening;transfer training;progressive activity/exercise;cognition;bed mobility training   Clinical Decision Making Complexity (OT) detailed assessment/moderate complexity   Risk & Benefits of therapy have been explained evaluation/treatment results reviewed;care plan/treatment goals  reviewed;patient   OT Total Evaluation Time   OT Eval, Moderate Complexity Minutes (50907) 10   OT Goals   Therapy Frequency (OT) 5 times/week   OT Predicted Duration/Target Date for Goal Attainment 03/30/25   OT Goals Hygiene/Grooming;Toilet Transfer/Toileting;Lower Body Dressing   OT: Hygiene/Grooming modified independent;while standing   OT: Lower Body Dressing Modified independent   OT: Toilet Transfer/Toileting Modified independent   Interventions   Interventions Quick Adds Self-Care/Home Management   Self-Care/Home Management   Self-Care/Home Mgmt/ADL, Compensatory, Meal Prep Minutes (12362) 23   Symptoms Noted During/After Treatment (Meal Preparation/Planning Training) fatigue   Treatment Detail/Skilled Intervention Eval complete, treatment initiated. Pt met sitting in chair- agreeable to work on ADLs. Completed sit<>stand transfers in session, mult reps with FWW and CGA-Min A; verbal cues for safe transfer tech and for sequencing. Fxl mob in room to/from bathroom with FWW and CGA- verbal cues for device advancement, LE advancement, safety, direction and navigation. Stood at sink for g/h tasks, CGA; cues for positioning in front of sink for incr support. Cues for problem solving during fxl tasks and to locate needed items within environment. Pt fatigued after approx 5 mins of standing and needed seated rest break. Worked on toileting task, CGA for sit<>stand transfer and for clothing management. LB dressing, SBA while seated with use of figure 4 tech. Pt setup in chair at end of session, all needs within reach.   OT Discharge Planning   OT Plan any ADLs; fxl transfers and FWW safety; dressing; toileting   OT Discharge Recommendation (DC Rec) Transitional Care Facility   OT Rationale for DC Rec Pt is a high falls risk- not at her ADL baseline.   OT Brief overview of current status CGA-Min A ADLs and fxl mob in room; cuing throughout for completing ADLs and for safety with fxl mob   OT Total Distance Amb During  Session (feet) 25   Total Session Time   Timed Code Treatment Minutes 23   Total Session Time (sum of timed and untimed services) 33   Yulia Davis OTR/MELIZA 3/23/2025

## 2025-03-23 NOTE — PLAN OF CARE
Problem: Delirium  Goal: Improved Attention and Thought Clarity  Outcome: Progressing  Intervention: Maximize Cognitive Function  Recent Flowsheet Documentation  Taken 3/23/2025 0022 by Vane Pritchard RN  Sensory Stimulation Regulation:   care clustered   quiet environment promoted  Reorientation Measures: clock in view   Goal Outcome Evaluation:       Pt A&Ox4 beginning of shift. Pt disoriented to time and situation later in shift. Pt denies pain. Awakenings minimized, sleeping most of night. External catheter in place overnight, leaked twice. On 3L nc overnight. Forgetful, kept forgetting external catheter was in place.

## 2025-03-24 LAB
ANION GAP SERPL CALCULATED.3IONS-SCNC: 8 MMOL/L (ref 7–15)
BACTERIA BLD CULT: ABNORMAL
BACTERIA BLD CULT: ABNORMAL
BUN SERPL-MCNC: 18.5 MG/DL (ref 8–23)
CALCIUM SERPL-MCNC: 8.5 MG/DL (ref 8.8–10.4)
CHLORIDE SERPL-SCNC: 108 MMOL/L (ref 98–107)
CREAT SERPL-MCNC: 0.84 MG/DL (ref 0.51–0.95)
EGFRCR SERPLBLD CKD-EPI 2021: 73 ML/MIN/1.73M2
ERYTHROCYTE [DISTWIDTH] IN BLOOD BY AUTOMATED COUNT: 13.2 % (ref 10–15)
GLUCOSE SERPL-MCNC: 102 MG/DL (ref 70–99)
HCO3 SERPL-SCNC: 28 MMOL/L (ref 22–29)
HCT VFR BLD AUTO: 35 % (ref 35–47)
HGB BLD-MCNC: 10.9 G/DL (ref 11.7–15.7)
MCH RBC QN AUTO: 31.3 PG (ref 26.5–33)
MCHC RBC AUTO-ENTMCNC: 31.1 G/DL (ref 31.5–36.5)
MCV RBC AUTO: 101 FL (ref 78–100)
PLATELET # BLD AUTO: 173 10E3/UL (ref 150–450)
POTASSIUM SERPL-SCNC: 4.8 MMOL/L (ref 3.4–5.3)
RBC # BLD AUTO: 3.48 10E6/UL (ref 3.8–5.2)
SODIUM SERPL-SCNC: 144 MMOL/L (ref 135–145)
WBC # BLD AUTO: 4.7 10E3/UL (ref 4–11)

## 2025-03-24 PROCEDURE — 36415 COLL VENOUS BLD VENIPUNCTURE: CPT

## 2025-03-24 PROCEDURE — 250N000011 HC RX IP 250 OP 636: Performed by: HOSPITALIST

## 2025-03-24 PROCEDURE — 250N000013 HC RX MED GY IP 250 OP 250 PS 637: Performed by: FAMILY MEDICINE

## 2025-03-24 PROCEDURE — 94799 UNLISTED PULMONARY SVC/PX: CPT

## 2025-03-24 PROCEDURE — 258N000003 HC RX IP 258 OP 636

## 2025-03-24 PROCEDURE — 94640 AIRWAY INHALATION TREATMENT: CPT

## 2025-03-24 PROCEDURE — 120N000001 HC R&B MED SURG/OB

## 2025-03-24 PROCEDURE — 250N000013 HC RX MED GY IP 250 OP 250 PS 637

## 2025-03-24 PROCEDURE — 99232 SBSQ HOSP IP/OBS MODERATE 35: CPT | Mod: GC

## 2025-03-24 PROCEDURE — 94640 AIRWAY INHALATION TREATMENT: CPT | Mod: 76

## 2025-03-24 PROCEDURE — 250N000011 HC RX IP 250 OP 636

## 2025-03-24 PROCEDURE — 999N000157 HC STATISTIC RCP TIME EA 10 MIN

## 2025-03-24 PROCEDURE — 250N000009 HC RX 250: Performed by: FAMILY MEDICINE

## 2025-03-24 PROCEDURE — 80048 BASIC METABOLIC PNL TOTAL CA: CPT

## 2025-03-24 PROCEDURE — 85027 COMPLETE CBC AUTOMATED: CPT

## 2025-03-24 RX ORDER — LORAZEPAM 2 MG/ML
1 INJECTION INTRAMUSCULAR EVERY 4 HOURS PRN
Status: DISCONTINUED | OUTPATIENT
Start: 2025-03-24 | End: 2025-03-24

## 2025-03-24 RX ORDER — HALOPERIDOL 5 MG/ML
2 INJECTION INTRAMUSCULAR EVERY 6 HOURS PRN
Status: DISCONTINUED | OUTPATIENT
Start: 2025-03-24 | End: 2025-03-24

## 2025-03-24 RX ADMIN — SODIUM CHLORIDE: 0.9 INJECTION, SOLUTION INTRAVENOUS at 20:17

## 2025-03-24 RX ADMIN — SODIUM CHLORIDE: 0.9 INJECTION, SOLUTION INTRAVENOUS at 06:17

## 2025-03-24 RX ADMIN — ENOXAPARIN SODIUM 40 MG: 40 INJECTION SUBCUTANEOUS at 20:52

## 2025-03-24 RX ADMIN — PANTOPRAZOLE SODIUM 40 MG: 40 TABLET, DELAYED RELEASE ORAL at 11:45

## 2025-03-24 RX ADMIN — Medication 1 MG: at 20:17

## 2025-03-24 RX ADMIN — LORAZEPAM 1 MG: 2 INJECTION INTRAMUSCULAR; INTRAVENOUS at 04:46

## 2025-03-24 RX ADMIN — IPRATROPIUM BROMIDE AND ALBUTEROL SULFATE 3 ML: .5; 3 SOLUTION RESPIRATORY (INHALATION) at 07:40

## 2025-03-24 RX ADMIN — IPRATROPIUM BROMIDE AND ALBUTEROL SULFATE 3 ML: .5; 3 SOLUTION RESPIRATORY (INHALATION) at 20:28

## 2025-03-24 RX ADMIN — CEFTRIAXONE SODIUM 2 G: 2 INJECTION, POWDER, FOR SOLUTION INTRAMUSCULAR; INTRAVENOUS at 17:34

## 2025-03-24 ASSESSMENT — ACTIVITIES OF DAILY LIVING (ADL)
ADLS_ACUITY_SCORE: 49
ADLS_ACUITY_SCORE: 46
ADLS_ACUITY_SCORE: 54
DEPENDENT_IADLS:: CLEANING;LAUNDRY;MEAL PREPARATION
ADLS_ACUITY_SCORE: 54
ADLS_ACUITY_SCORE: 46
ADLS_ACUITY_SCORE: 54
ADLS_ACUITY_SCORE: 52
ADLS_ACUITY_SCORE: 49
ADLS_ACUITY_SCORE: 54
ADLS_ACUITY_SCORE: 46
ADLS_ACUITY_SCORE: 54
ADLS_ACUITY_SCORE: 48
ADLS_ACUITY_SCORE: 48
ADLS_ACUITY_SCORE: 54
ADLS_ACUITY_SCORE: 49
ADLS_ACUITY_SCORE: 48
ADLS_ACUITY_SCORE: 54

## 2025-03-24 NOTE — PLAN OF CARE
Problem: Adult Inpatient Plan of Care  Goal: Optimal Comfort and Wellbeing  Outcome: Progressing  Intervention: Provide Person-Centered Care  Recent Flowsheet Documentation  Taken 3/23/2025 1800 by Marissa Salazar RN  Trust Relationship/Rapport:   care explained   choices provided   Goal Outcome Evaluation:      Plan of Care Reviewed With: patient     Educated pt on risk of falls and use of bed alarms. Encouraged pt to use call light to ask for help. Pt did not want any meals this evening because explained she had a big lunch. Denies pain and involved with cares.

## 2025-03-24 NOTE — CONSULTS
Care Management Initial Consult    General Information  Assessment completed with: Family, son Juancarlos  Type of CM/SW Visit: Initial Assessment    Primary Care Provider verified and updated as needed: Yes   Readmission within the last 30 days: no previous admission in last 30 days      Reason for Consult: discharge planning  Advance Care Planning:            Communication Assessment  Patient's communication style: spoken language (English or Bilingual)    Hearing Difficulty or Deaf: no   Wear Glasses or Blind: yes    Cognitive  Cognitive/Neuro/Behavioral: WDL  Level of Consciousness: lethargic     Orientation: oriented x 4     Best Language: 0 - No aphasia  Speech: clear    Living Environment:   People in home: child(reba), adult     Current living Arrangements: house      Able to return to prior arrangements: yes  Living Arrangement Comments: lives in her house; her son Juancarlos lives with her    Family/Social Support:  Care provided by: self, child(reba)  Provides care for: no one, unable/limited ability to care for self  Marital Status: Single  Support system: Children          Description of Support System: Supportive    Support Assessment: Adequate family and caregiver support    Current Resources:   Patient receiving home care services: No        Community Resources: None  Equipment currently used at home: cane, straight, walker, standard  Supplies currently used at home: None    Employment/Financial:  Employment Status: retired        Financial Concerns:             Does the patient's insurance plan have a 3 day qualifying hospital stay waiver?  No    Lifestyle & Psychosocial Needs:  Social Drivers of Health     Food Insecurity: Low Risk  (3/23/2025)    Food Insecurity     Within the past 12 months, did you worry that your food would run out before you got money to buy more?: No     Within the past 12 months, did the food you bought just not last and you didn t have money to get more?: No   Depression:  Not at risk (1/24/2025)    Received from [facility]    PHQ-2     PHQ-2 TOTAL SCORE: 1   Housing Stability: Low Risk  (3/23/2025)    Housing Stability     Do you have housing? : Yes     Are you worried about losing your housing?: No   Recent Concern: Housing Stability - High Risk (3/21/2025)    Housing Stability     Do you have housing? : No     Are you worried about losing your housing?: Not on file   Tobacco Use: Medium Risk (10/11/2024)    Received from [facility]    Patient History     Smoking Tobacco Use: Former     Smokeless Tobacco Use: Never     Passive Exposure: Not on file   Financial Resource Strain: Low Risk  (3/21/2025)    Financial Resource Strain     Within the past 12 months, have you or your family members you live with been unable to get utilities (heat, electricity) when it was really needed?: No   Alcohol Use: Not on file   Transportation Needs: Low Risk  (3/23/2025)    Transportation Needs     Within the past 12 months, has lack of transportation kept you from medical appointments, getting your medicines, non-medical meetings or appointments, work, or from getting things that you need?: No   Physical Activity: Not on file   Interpersonal Safety: Low Risk  (3/21/2025)    Interpersonal Safety     Do you feel physically and emotionally safe where you currently live?: Yes     Within the past 12 months, have you been hit, slapped, kicked or otherwise physically hurt by someone?: No     Within the past 12 months, have you been humiliated or emotionally abused in other ways by your partner or ex-partner?: No   Stress: Not on file   Social Connections: Socially Integrated (1/15/2025)    Received from [facility]    Social Connections     Do you often feel lonely or isolated from those around you?: 0   Health Literacy: Not on file       Functional Status:  Prior to admission patient needed assistance:    Dependent ADLs:: Ambulation-cane  Dependent IADLs:: Cleaning, Laundry, Meal Preparation       Mental Health Status:  Mental Health Status: No Current Concerns       Chemical Dependency Status:  Chemical Dependency Status: No Current Concerns             Values/Beliefs:  Spiritual, Cultural Beliefs, Rastafari Practices, Values that affect care:                 Discussed  Partnership in Safe Discharge Planning  document with patient/family: No    Additional Information:    Initial CM/SW assessment today.  SW attempted to meet with patient 2x this AM, she is sleeping both attempts.  Called and spoke with son Juancarlos via phone.  He confirms he lives with patient in her house; confirms he does the laundry, cleaning, and meal prep.  States he encourages mobility in the home as best as he can but that he does laundry and most cleaning so she does not have to navigate the steps.     Discussed recs for TCU.  He reports she was previously at [facility] (Sept 2024). He reports he feels this was  good for her but also feels she will decline TCU recs this time and prefer to return home.  He says he will talk with her later today about this; advised SW will also continue attempts to talk with her again on this.      Next Steps:     SW to continue attempts to meet with patient and discuss recs for TCU.  Obtain TCU choices, if she declines, will inquire on home care.      KAY Monahan    SW met with patient in her room.  She has  her wallet on her bed, is meticulously going through her wallet, taking things out and putting them back in.  SW reviewed CM role to assist  with  discharge planning and needs.      Advised have spoken with her  son Juancarlos, reviewed conversation had with him.  Reviewed recommendations from therapy for TCU.  Patient states she does not want to go to TCU, that when she went last time she didn't think they did much for her.  Inquired on/discussed having home care for RN  and therapy support when she discharge home.  She asks how much will this cost; reviewed and advised that home care is covered as a skilled benefit via Medicare; also  advised that  this is not for long term support, that home care is usually only a few visits/week at home for a few weeks.      During today's visit, patient appears distracted by her wallet and the items in this.  She asks this writer where she is; inquired if she is aware she is in the hospital and she confirms she knows this but asks where in the hospital she is because it looks different than it did a few days ago.  Reviewed that she came in through the ER then was admitted and placed  on a medical unit.  She asks several times why  she's still here, states she wants to go home today and that she feels like she's being held prisoner.  Advised that she continues to have a medical need for hospitalization but that I will  communicate with the medical team her desire to discontinue to home with home care and to not go to TCU.      Message with following MD of above conversation and patient's request to discharge to home.    2:36 PM

## 2025-03-24 NOTE — SIGNIFICANT EVENT
Significant Event Note    Time of event: 4:36 AM March 24, 2025    Description of event:  Notified by nursing that patient became very agitated and was threatening violence. Efforts to redirect were not successful. Hospitalist cross cover also paged first and ordered Ativan.     Discussed with: bedside nurse    Karen Malave MD

## 2025-03-24 NOTE — PLAN OF CARE
Goal Outcome Evaluation:    Problem: Adult Inpatient Plan of Care  Goal: Optimal Comfort and Wellbeing  Outcome: Progressing  Denies pain.    Problem: Delirium  Goal: Improved Attention and Thought Clarity  Outcome: Progressing  Has been sleepy this shift, sleeping after breakfast for awhile, declined lunch. Awake again this afternoon sitting in bed counting money. Some of her money was sent to security.     Room near desk.

## 2025-03-24 NOTE — PROGRESS NOTES
[facility]    Progress Note - Hospitalist Service       Date of Admission:  3/21/2025    Assessment & Plan   Vy Dooley is a 74 year old female admitted on 3/21/2025. She has history of frequent UTIs, mild cognitive impairment, COPD, asthma, GERD, restless leg syndrome, depression and is admitted for E coli bacteremia with urinary source and septic encephalopathy.     E coli bacteremia   Sepsis due to E coli UTI with end organ dysfunction of FLO  Septic encephalopathy  H/o mild cognitive impairment  Presented to ED for confusion. Increased urinary frequency but ROS otherwise unrevealing. History of frequent UTIs, history of ESBL E coli. Per her sister, she often gets very confused when she gets them.  Vitals initially stable. Overnight 3/21, became septic with fever, tachycardia, hypoxia. UA suggestive of infection. Urine culture with gram negative bacilli. Blood culture with E coli (1 of 2). Clinical picture consistent with E coli bacteremia with urinary source and septic encephalopathy.   - Ceftriaxone   - S/p meropenem 3/21 - 3/23  - Daily CBC, BMP  - Monitor vitals closely   - Full code     Agitation  Mild cognitive impairment at baseline  Septic encephalopathy   History of mild cognitive impairment. Lives at home with son. Developed agitation overnight 3/23, treated with ativan and haldol. Now very sleepy today.   - Continue to monitor   - Delirium precautions   - Avoid overnight interruptions as able by clustering cares    - Keep awake during day time to optimize sleep at night.     FLO, resolved   Likely 2/2 dehydration, sepsis. Cr now at baseline 0.8-0.9.  - Continue to monitor BMP daily      Goals of care   Per son Juancarlos, patient is full code. Would want all medical care including pressors, ICU, intubation, resuscitation.   Per sister Grace, Vy has had difficulty taking care of herself, she lives with her son who has said he will take care of her but he  sometimes is unable to provide the help she needs.  She has frequent UTIs and often gets very confused when she gets them.  Vy has refused longterm before.  - Full code     Falls at home  3x occurrences, last a few months ago, no reported history of hitting her head. Unsure if home is safe for her from a fall perspective.  PT recommending TCU.  - Fall precautions  - PT/OT  - Consider home eval  - Consider walker/cane at discharge     History of COPD, Asthma on home oxygen  Reportedly uses inhaler, unsure which one, not on her medication list. Has been without inhaler for 1-2 weeks. Reports using home oxygen, did not come in on oxygen, now on 2L in ED and breathing comfortably without wheezing on exam.   - Duonebs PRN  - Oxygen PRN        Diet: Combination Diet Regular Diet Adult  Snacks/Supplements Adult: Ensure Enlive; Between Meals    DVT Prophylaxis: Enoxaparin (Lovenox) SQ  Muniz Catheter: Not present  Fluids: po  Lines: None     Cardiac Monitoring: None  Code Status: Full Code      Clinically Significant Risk Factors          # Hyperchloremia: Highest Cl = 108 mmol/L in last 2 days, will monitor as appropriate                          # Severe Malnutrition: based on nutrition assessment and treatment provided per dietitian's recommendations., PRESENT ON ADMISSION   # Asthma: noted on problem list        Social Drivers of Health   Housing Stability: Low Risk  (3/23/2025)    Housing Stability     Do you have housing? : Yes     Are you worried about losing your housing?: No   Recent Concern: Housing Stability - High Risk (3/21/2025)    Housing Stability     Do you have housing? : No   Tobacco Use: Medium Risk (10/11/2024)    Received from [facility]    Patient History     Smoking Tobacco Use: Former     Smokeless Tobacco Use: Never         Disposition Plan     Medically Ready for Discharge: Anticipated in 2-4 Days       The patient's care was discussed with the Attending Physician,  Dr. lunsford .    Collette Landa MD  Hospitalist Service  [facility]  Securely message with [employer] (more info)  Text page via [employer] Paging/Directory   ______________________________________________________________________    Interval History     Became agitated overnight. Was given ativan and haldol. Very fatigued this morning but awakens to voice. Denies pain or discomfort.     Physical Exam   Vital Signs: Temp: 98.1  F (36.7  C) Temp src: Oral BP: 116/59 Pulse: 73   Resp: 18 SpO2: 95 % O2 Device: Nasal cannula Oxygen Delivery: 1 LPM  Weight: 133 lbs 6.05 oz    GENERAL:, alert and no distress,, very fatigued   RESP: lungs clear, speaking in full sentences, normal work of breathing   CV: heart sounds faint, extremities appear well perfused  ABDOMEN: soft, nontender  MS: no gross musculoskeletal defects noted, no edema  PSYCH: mentation appears normal, affect normal/bright       Data     I have personally reviewed the following data over the past 24 hrs:    4.7  \   10.9 (L)   / 173     144 108 (H) 18.5 /  102 (H)   4.8 28 0.84 \       Imaging results reviewed over the past 24 hrs:   No results found for this or any previous visit (from the past 24 hours).

## 2025-03-24 NOTE — PLAN OF CARE
Problem: Adult Inpatient Plan of Care  Goal: Optimal Comfort and Wellbeing  Outcome: Progressing     Problem: Delirium  Goal: Improved Sleep  Outcome: Progressing  Intervention: Promote Sleep  Recent Flowsheet Documentation  Taken 3/24/2025 0000 by Gabriel Rosado RN  Sleep/Rest Enhancement:   awakenings minimized   comfort measures     Problem: Gas Exchange Impaired  Goal: Optimal Gas Exchange  Outcome: Progressing  Intervention: Optimize Oxygenation and Ventilation  Recent Flowsheet Documentation  Taken 3/24/2025 0000 by Gabriel Rosado RN  Head of Bed (HOB) Positioning: HOB at 30 degrees   Goal Outcome Evaluation:       A&O x 2, disoriented to situation and time. Pt is also occasionally forgetful. Ambulates 1x assist w/walker. Admitted for sepsis w/encephalopathy w/o septic shock. PIV access left lower forearm running continuous NS @75mL/hr. VSS on 3L NC. BP slightly elevated, no hydralazine indicated. Pt agitated this shift. PRN ativan 1mg administered with great effect. Pt denies pain. Bed in low position, call light within reach. Non-slip footwear in use.   Visit Vitals  BP (!) 164/95 (BP Location: Right arm)   Pulse 62   Temp 99.6  F (37.6  C) (Oral)   Resp 18

## 2025-03-25 ENCOUNTER — APPOINTMENT (OUTPATIENT)
Dept: PHYSICAL THERAPY | Facility: HOSPITAL | Age: 75
End: 2025-03-25
Payer: COMMERCIAL

## 2025-03-25 ENCOUNTER — APPOINTMENT (OUTPATIENT)
Dept: OCCUPATIONAL THERAPY | Facility: HOSPITAL | Age: 75
End: 2025-03-25
Payer: COMMERCIAL

## 2025-03-25 LAB
ANION GAP SERPL CALCULATED.3IONS-SCNC: 7 MMOL/L (ref 7–15)
BUN SERPL-MCNC: 13.8 MG/DL (ref 8–23)
CALCIUM SERPL-MCNC: 8.1 MG/DL (ref 8.8–10.4)
CHLORIDE SERPL-SCNC: 110 MMOL/L (ref 98–107)
CREAT SERPL-MCNC: 0.84 MG/DL (ref 0.51–0.95)
EGFRCR SERPLBLD CKD-EPI 2021: 73 ML/MIN/1.73M2
ERYTHROCYTE [DISTWIDTH] IN BLOOD BY AUTOMATED COUNT: 13.1 % (ref 10–15)
GLUCOSE SERPL-MCNC: 99 MG/DL (ref 70–99)
HCO3 SERPL-SCNC: 27 MMOL/L (ref 22–29)
HCT VFR BLD AUTO: 32.6 % (ref 35–47)
HGB BLD-MCNC: 10.2 G/DL (ref 11.7–15.7)
MCH RBC QN AUTO: 31.4 PG (ref 26.5–33)
MCHC RBC AUTO-ENTMCNC: 31.3 G/DL (ref 31.5–36.5)
MCV RBC AUTO: 100 FL (ref 78–100)
PLATELET # BLD AUTO: 179 10E3/UL (ref 150–450)
POTASSIUM SERPL-SCNC: 4.2 MMOL/L (ref 3.4–5.3)
RBC # BLD AUTO: 3.25 10E6/UL (ref 3.8–5.2)
SODIUM SERPL-SCNC: 144 MMOL/L (ref 135–145)
WBC # BLD AUTO: 3.6 10E3/UL (ref 4–11)

## 2025-03-25 PROCEDURE — 120N000001 HC R&B MED SURG/OB

## 2025-03-25 PROCEDURE — 250N000009 HC RX 250

## 2025-03-25 PROCEDURE — 94799 UNLISTED PULMONARY SVC/PX: CPT

## 2025-03-25 PROCEDURE — 36415 COLL VENOUS BLD VENIPUNCTURE: CPT

## 2025-03-25 PROCEDURE — 250N000013 HC RX MED GY IP 250 OP 250 PS 637

## 2025-03-25 PROCEDURE — 97129 THER IVNTJ 1ST 15 MIN: CPT | Mod: GO

## 2025-03-25 PROCEDURE — 94640 AIRWAY INHALATION TREATMENT: CPT

## 2025-03-25 PROCEDURE — 250N000011 HC RX IP 250 OP 636

## 2025-03-25 PROCEDURE — 80048 BASIC METABOLIC PNL TOTAL CA: CPT

## 2025-03-25 PROCEDURE — 97535 SELF CARE MNGMENT TRAINING: CPT | Mod: GO

## 2025-03-25 PROCEDURE — 250N000009 HC RX 250: Performed by: FAMILY MEDICINE

## 2025-03-25 PROCEDURE — 999N000156 HC STATISTIC RCP CONSULT EA 30 MIN

## 2025-03-25 PROCEDURE — 97530 THERAPEUTIC ACTIVITIES: CPT | Mod: GP | Performed by: PHYSICAL THERAPIST

## 2025-03-25 PROCEDURE — 94640 AIRWAY INHALATION TREATMENT: CPT | Mod: 76

## 2025-03-25 PROCEDURE — 258N000003 HC RX IP 258 OP 636

## 2025-03-25 PROCEDURE — 999N000157 HC STATISTIC RCP TIME EA 10 MIN

## 2025-03-25 PROCEDURE — 99232 SBSQ HOSP IP/OBS MODERATE 35: CPT | Mod: GC

## 2025-03-25 PROCEDURE — 250N000013 HC RX MED GY IP 250 OP 250 PS 637: Performed by: FAMILY MEDICINE

## 2025-03-25 PROCEDURE — 85027 COMPLETE CBC AUTOMATED: CPT

## 2025-03-25 RX ORDER — SULFAMETHOXAZOLE AND TRIMETHOPRIM 800; 160 MG/1; MG/1
1 TABLET ORAL 2 TIMES DAILY
Status: DISCONTINUED | OUTPATIENT
Start: 2025-03-25 | End: 2025-04-01 | Stop reason: HOSPADM

## 2025-03-25 RX ORDER — IPRATROPIUM BROMIDE AND ALBUTEROL SULFATE 2.5; .5 MG/3ML; MG/3ML
3 SOLUTION RESPIRATORY (INHALATION)
Status: DISCONTINUED | OUTPATIENT
Start: 2025-03-25 | End: 2025-03-30

## 2025-03-25 RX ADMIN — UMECLIDINIUM 1 PUFF: 62.5 AEROSOL, POWDER ORAL at 16:04

## 2025-03-25 RX ADMIN — TRAZODONE HYDROCHLORIDE 100 MG: 50 TABLET ORAL at 01:17

## 2025-03-25 RX ADMIN — SULFAMETHOXAZOLE AND TRIMETHOPRIM 1 TABLET: 800; 160 TABLET ORAL at 18:54

## 2025-03-25 RX ADMIN — IPRATROPIUM BROMIDE AND ALBUTEROL SULFATE 3 ML: .5; 3 SOLUTION RESPIRATORY (INHALATION) at 15:14

## 2025-03-25 RX ADMIN — SODIUM CHLORIDE: 0.9 INJECTION, SOLUTION INTRAVENOUS at 08:24

## 2025-03-25 RX ADMIN — IPRATROPIUM BROMIDE AND ALBUTEROL SULFATE 3 ML: .5; 3 SOLUTION RESPIRATORY (INHALATION) at 07:49

## 2025-03-25 RX ADMIN — ACETAMINOPHEN 650 MG: 325 TABLET ORAL at 12:07

## 2025-03-25 RX ADMIN — IPRATROPIUM BROMIDE AND ALBUTEROL SULFATE 3 ML: .5; 3 SOLUTION RESPIRATORY (INHALATION) at 19:31

## 2025-03-25 RX ADMIN — ENOXAPARIN SODIUM 40 MG: 40 INJECTION SUBCUTANEOUS at 21:04

## 2025-03-25 RX ADMIN — PANTOPRAZOLE SODIUM 40 MG: 40 TABLET, DELAYED RELEASE ORAL at 06:52

## 2025-03-25 ASSESSMENT — ACTIVITIES OF DAILY LIVING (ADL)
ADLS_ACUITY_SCORE: 50
ADLS_ACUITY_SCORE: 54
ADLS_ACUITY_SCORE: 50

## 2025-03-25 NOTE — PLAN OF CARE
Goal Outcome Evaluation:  Patient is alert and oriented but forgetful.   Reported headache this afternoon, tylenol given with relief.  Regular diet-eating well. Encourage PO fluid intake.  IV Sl'd.   Ceftriaxone dc'd. Now on PO Bactrim.  Has required 1-2L O2/NC to keep sats >92%  Up frequently to the BR. Only using purewick at night. Assist of 1 with gait belt and walker. Can be unsteady at times.

## 2025-03-25 NOTE — PROGRESS NOTES
[facility]    Progress Note - Hospitalist Service       Date of Admission:  3/21/2025    Assessment & Plan   Vy Dooley is a 74 year old female admitted on 3/21/2025. She has history of frequent UTIs, mild cognitive impairment, COPD, asthma, GERD, restless leg syndrome, depression and is admitted for E coli bacteremia with urinary source and septic encephalopathy. Clinically improving.      E coli bacteremia   Sepsis due to E coli UTI with end organ dysfunction of FLO  Septic encephalopathy  H/o mild cognitive impairment  Presented to ED for confusion. Increased urinary frequency but ROS otherwise unrevealing. History of frequent UTIs, history of ESBL E coli. Per her sister, she often gets very confused when she gets them.  Vitals initially stable. Overnight 3/21, became septic with fever, tachycardia, hypoxia. UA suggestive of infection. Urine culture with gram negative bacilli. Blood culture with E coli (1 of 2). Clinical picture consistent with E coli bacteremia with urinary source and septic encephalopathy. Clinically improving.   - Antibiotics:    - Transition to PO Bactrim today  - S/p meropenem 3/21 - 3/23  - S/p ceftriaxone 3/23-3/25  - Discontinue daily labs as they have been stable    Septic encephalopathy, improving  Delirium, improving  Mild cognitive impairment at baseline  Septic encephalopathy   History of mild cognitive impairment. Lives at home with son. Developed agitation overnight 3/23, treated with ativan and haldol, then was very somnolent on 3/24. Today 3/25, mental status is much improved - alert and oriented to person, place, and situation (not time).  SLUMS on 3/25 which was 19.   - Continue to monitor   - Delirium precautions   - Avoid overnight interruptions as able by clustering cares    - Keep awake during day time to optimize sleep at night.     FLO, resolved   Likely 2/2 dehydration, sepsis. Cr now at baseline 0.8-0.9.     Goals of care   Per son  Juancarlos, patient is full code. Would want all medical care including pressors, ICU, intubation, resuscitation.   Per sister Grace, Vy has had difficulty taking care of herself, she lives with her son who has said he will take care of her but he sometimes is unable to provide the help she needs.  She has frequent UTIs and often gets very confused when she gets them.  Vy has refused [facility] before.  - Full code     Discharge planning  Mild cognitive impairment reported at baseline. Lives with son and his girlfriend. Does not get additional help at home. PT recommending TCU. Patient adamantly declines TCU and wants to discharge home. Will call her son this afternoon to see if that is feasible. OT completed SLUMS on 3/25 which was 19.   - PT/OT/CM   - TCU recommended   - Patient prefers discharge to home with son   - Will call son this afternoon to discuss   - Consider home eval  - Consider walker/cane at discharge     History of COPD, Asthma on home oxygen  Reportedly uses inhaler, unsure which one, not on her medication list. Has been without inhaler for 1-2 weeks. Reports using home oxygen, did not come in on oxygen, now on 2L in ED and breathing comfortably without wheezing on exam.   - Duonebs PRN  - Oxygen PRN        Diet: Combination Diet Regular Diet Adult  Snacks/Supplements Adult: Ensure Enlive; Between Meals    DVT Prophylaxis: Enoxaparin (Lovenox) SQ  Muniz Catheter: Not present  Fluids: po  Lines: None     Cardiac Monitoring: None  Code Status: Full Code      Clinically Significant Risk Factors          # Hyperchloremia: Highest Cl = 110 mmol/L in last 2 days, will monitor as appropriate                          # Severe Malnutrition: based on nutrition assessment and treatment provided per dietitian's recommendations., PRESENT ON ADMISSION   # Asthma: noted on problem list        Social Drivers of Health   Housing Stability: Low Risk  (3/23/2025)    Housing Stability     Do you have housing? : Yes      Are you worried about losing your housing?: No   Recent Concern: Housing Stability - High Risk (3/21/2025)    Housing Stability     Do you have housing? : No   Tobacco Use: Medium Risk (10/11/2024)    [facility]    Patient History     Smoking Tobacco Use: Former     Smokeless Tobacco Use: Never         Disposition Plan     Medically Ready for Discharge: Anticipated in 2-4 Days       The patient's care was discussed with the Attending Physician, Dr. lunsford .    Collette Landa MD  Hospitalist Service  [facility]  Securely message with [employer] (more info)  Text page via [facility] Paging/Directory   ______________________________________________________________________    Interval History     Doing much better today. Awake and conversing with me. Denies pain, discomfort, dyspnea, dysuria. Does report a bit of nasal congestion this morning that has now cleared up.     Physical Exam   Vital Signs: Temp: 98.2  F (36.8  C) Temp src: Oral BP: (!) 149/69 Pulse: 85   Resp: 18 SpO2: 95 % O2 Device: Nasal cannula Oxygen Delivery: 1 LPM  Weight: 133 lbs 6.05 oz    GENERAL:, alert and no distress, sitting up in chair reading on my arrival, conversational today   RESP: lungs clear, speaking in full sentences, normal work of breathing, LCTAB, no wheezes or crackles   CV: RRR, faint systolic murmur, extremities appear well perfused  ABDOMEN: soft, nontender  MS: no gross musculoskeletal defects noted, no edema  PSYCH: mentation appears normal, affect normal  NEURO: Oriented to person, place, and situation (knows she is hospitalized for a UTI). Not oriented to time.       Data     I have personally reviewed the following data over the past 24 hrs:    3.6 (L)  \   10.2 (L)   / 179     144 110 (H) 13.8 /  99   4.2 27 0.84 \       Imaging results reviewed over the past 24 hrs:   No results found for this or any previous visit (from the past 24 hours).

## 2025-03-25 NOTE — PROGRESS NOTES
CLINICAL NUTRITION SERVICES - BRIEF NOTE     INFORMATION OBTAINED  Assessed patient in room.  Pt with confusion, sitting up in bed chewing gum. Pt reports taking ensure enlive. Pt unsure if she had breakfast this AM.     CURRENT NUTRITION ORDERS  Diet: Regular  Snacks/Supplements: Ensure Enlive daily      CURRENT INTAKE/TOLERANCE  3/22: declined breakfast, 75% lunch and dinner   3/23: po at breakfast not documented, declined lunch and dinner   3/24: 75% breakfast and dinner   Nutrition supplement intakes not documented   Pt meeting 70-90% estimated kcal needs and % estimated protein needs 3/24      NEW FINDINGS  GI symptoms: BM: x1 3/24, x1 this AM  Nutrition-relevant labs: Reviewed   Nutrition-relevant medications: Continuous IVF NaCl 75 ml/hr   Scheduled rocephin, protonix  Weight: 11% wt loss x6 months   03/21/25  60.5 kg (133 lb 6.1 oz)   09/20/24  68.3 kg (150 lb 8 oz)     MALNUTRITION  Malnutrition Diagnosis: Severe malnutrition in the context of acute illness or injury  Malnutrition Present on Admission: Yes    INTERVENTIONS  Medical food supplement therapy- Continue ensure enlive daily      MONITORING/EVALUATION  Progress toward goals will be monitored and evaluated per policy.

## 2025-03-25 NOTE — PROGRESS NOTES
Pt given 16:00 round neb.  Instructed on new medications ordered.  Pt continues to repeat and ask questions about her albuterol inhaler.  Pt and I discussed medications again.  Pt appears a little more confused this afternoon.  Pt needed instructed again on flutter again.

## 2025-03-25 NOTE — PROGRESS NOTES
Brief Update    Discussed with son Juancarlos who agrees patient needs TCU. Discussed that with her SLUMS score of 19 today, she likely cannot make her own medical decisions. Discussed with care management that plan will be for TCU. Son will discuss with patient.     Patient hypoxic requiring 1-2 L. Per RT, did have quite a bit of wheezing this morning prior to neb. Will restart PTA LAMA inhaler and increase frequency of DuoNebs to QID. Per son, patient is supposed to be on continuous O2 at home but she rarely actually uses it.     Collette Landa MD, PGY-3  [facility]

## 2025-03-25 NOTE — PLAN OF CARE
Problem: Adult Inpatient Plan of Care  Goal: Absence of Hospital-Acquired Illness or Injury  Intervention: Identify and Manage Fall Risk  Recent Flowsheet Documentation  Taken 3/25/2025 0010 by Tere Wilson RN  Safety Promotion/Fall Prevention:   activity supervised   assistive device/personal items within reach   Goal Outcome Evaluation:       Patient slept between cares, prn Trazodone given for sleep was effective. Denies pain, Iv fluid running per order.Contact precaution maintained  Vital signs:  Temp: 97.4  F (36.3  C) Temp src: Oral BP: (!) 142/91 Pulse: 95   Resp: 18 SpO2: 94 % O2 Device: Nasal cannula Oxygen Delivery: 1 LPM

## 2025-03-25 NOTE — PLAN OF CARE
"Goal Outcome Evaluation:             RCAT Treatment Plan    Patient Score: 6  Patient Acuity: 4    Clinical Indication for Therapy: COPD    Therapy Ordered: duo neb bid, and flutter bid    Assessment Summary: Pt c/o sob this morning before neb.  Pt felt much better after.  Pt has an empty albuterol inhaler at bedside.  Pt instructed to call for prn albuterol inhaler.  Pt also states that she uses \"some type of disc inhaler at home\".  I am unable to find what that is in the chart.  Deferred this and left message with Hospitalist to review.  Encouraged d/b cough.      Cady Mcintosh, RT  3/25/2025            "

## 2025-03-25 NOTE — PLAN OF CARE
"  Problem: Adult Inpatient Plan of Care  Goal: Plan of Care Review  Description: The Plan of Care Review/Shift note should be completed every shift.  The Outcome Evaluation is a brief statement about your assessment that the patient is improving, declining, or no change.  This information will be displayed automatically on your shiftnote.  Outcome: Progressing     Problem: Adult Inpatient Plan of Care  Goal: Patient-Specific Goal (Individualized)  Description: You can add care plan individualizations to a care plan. Examples of Individualization might be:  \"Parent requests to be called daily at 9am for status\", \"I have a hard time hearing out of my right ear\", or \"Do not touch me to wake me up as it startlesme\".  Outcome: Progressing     Problem: Adult Inpatient Plan of Care  Goal: Optimal Comfort and Wellbeing  Outcome: Progressing     Patient alert and confused to situation. Slightly elevated BP, on RA. IV fluids running per orders. Takes meds well. Gave melatonin at HS, has trazodone if needed. IV antibiotics ran. On contact precautions. One assist, walker and GB. Regular diet, ate supper well.    "

## 2025-03-26 LAB — BACTERIA BLD CULT: NO GROWTH

## 2025-03-26 PROCEDURE — 999N000157 HC STATISTIC RCP TIME EA 10 MIN

## 2025-03-26 PROCEDURE — 120N000001 HC R&B MED SURG/OB

## 2025-03-26 PROCEDURE — 94640 AIRWAY INHALATION TREATMENT: CPT | Mod: 76

## 2025-03-26 PROCEDURE — 250N000013 HC RX MED GY IP 250 OP 250 PS 637

## 2025-03-26 PROCEDURE — 250N000009 HC RX 250

## 2025-03-26 PROCEDURE — 94799 UNLISTED PULMONARY SVC/PX: CPT

## 2025-03-26 PROCEDURE — 250N000013 HC RX MED GY IP 250 OP 250 PS 637: Performed by: FAMILY MEDICINE

## 2025-03-26 PROCEDURE — 94640 AIRWAY INHALATION TREATMENT: CPT

## 2025-03-26 PROCEDURE — 99232 SBSQ HOSP IP/OBS MODERATE 35: CPT | Mod: GC

## 2025-03-26 PROCEDURE — 250N000011 HC RX IP 250 OP 636

## 2025-03-26 RX ADMIN — IPRATROPIUM BROMIDE AND ALBUTEROL SULFATE 3 ML: .5; 3 SOLUTION RESPIRATORY (INHALATION) at 08:23

## 2025-03-26 RX ADMIN — ACETAMINOPHEN 650 MG: 325 TABLET ORAL at 00:11

## 2025-03-26 RX ADMIN — IPRATROPIUM BROMIDE AND ALBUTEROL SULFATE 3 ML: .5; 3 SOLUTION RESPIRATORY (INHALATION) at 11:33

## 2025-03-26 RX ADMIN — ROPINIROLE HYDROCHLORIDE 2 MG: 1 TABLET, FILM COATED ORAL at 00:12

## 2025-03-26 RX ADMIN — PANTOPRAZOLE SODIUM 40 MG: 40 TABLET, DELAYED RELEASE ORAL at 08:08

## 2025-03-26 RX ADMIN — ACETAMINOPHEN 650 MG: 325 TABLET ORAL at 07:00

## 2025-03-26 RX ADMIN — SULFAMETHOXAZOLE AND TRIMETHOPRIM 1 TABLET: 800; 160 TABLET ORAL at 08:09

## 2025-03-26 RX ADMIN — IPRATROPIUM BROMIDE AND ALBUTEROL SULFATE 3 ML: .5; 3 SOLUTION RESPIRATORY (INHALATION) at 19:43

## 2025-03-26 RX ADMIN — CALCIUM CARBONATE (ANTACID) CHEW TAB 500 MG 1000 MG: 500 CHEW TAB at 13:14

## 2025-03-26 RX ADMIN — UMECLIDINIUM 1 PUFF: 62.5 AEROSOL, POWDER ORAL at 08:07

## 2025-03-26 RX ADMIN — ENOXAPARIN SODIUM 40 MG: 40 INJECTION SUBCUTANEOUS at 21:06

## 2025-03-26 RX ADMIN — IPRATROPIUM BROMIDE AND ALBUTEROL SULFATE 3 ML: .5; 3 SOLUTION RESPIRATORY (INHALATION) at 15:18

## 2025-03-26 RX ADMIN — SULFAMETHOXAZOLE AND TRIMETHOPRIM 1 TABLET: 800; 160 TABLET ORAL at 21:06

## 2025-03-26 ASSESSMENT — ACTIVITIES OF DAILY LIVING (ADL)
ADLS_ACUITY_SCORE: 50

## 2025-03-26 NOTE — PLAN OF CARE
Problem: Infection  Goal: Absence of Infection Signs and Symptoms  Outcome: Progressing     Problem: Gas Exchange Impaired  Goal: Optimal Gas Exchange  Outcome: Progressing   Goal Outcome Evaluation:       Writer took over patient's assignment at 1900, afebrile, no sign and symptoms of sepsis, denies pain or discomfort, remains of 02@2l, sats has been within normal limits, has been using the call light appropriately, voided x2, alert and oriented.

## 2025-03-26 NOTE — PLAN OF CARE
Problem: Adult Inpatient Plan of Care  Goal: Optimal Comfort and Wellbeing  Outcome: Progressing     Problem: Gas Exchange Impaired  Goal: Optimal Gas Exchange  Outcome: Progressing  Intervention: Optimize Oxygenation and Ventilation  Recent Flowsheet Documentation  Taken 3/26/2025 0003 by Irlanda Toure, MICHELE  Head of Bed (HOB) Positioning:   HOB at 30-45 degrees   HOB at 20-30 degrees   Goal Outcome Evaluation:       Pt is A/O x4, with forgetfulness. C/O leg and ABD pain. Prn APAP and Requip administer and effective. On 2L O2. VSS, continue to monitor. Contact precaution maintain. Irlanda Toure, RN

## 2025-03-26 NOTE — PLAN OF CARE
Problem: Delirium  Goal: Improved Behavioral Control  Outcome: Progressing     Problem: Infection  Goal: Absence of Infection Signs and Symptoms  Outcome: Progressing     Problem: Gas Exchange Impaired  Goal: Optimal Gas Exchange  Outcome: Progressing   Goal Outcome Evaluation:       Patient had no behaviors, was med and care compliant, has been using the call light appropriately, afebrile, no sign ans symptoms of sepsis, remains on antibiotics, remains on 02@2l, no respiratory distress, denies pain or discomfort, alert and oriented but forgetful, vitals stable.

## 2025-03-26 NOTE — PLAN OF CARE
Goal Outcome Evaluation:  Patient reported a headache this morning. Night Rn gave tylenol at 0700. When reassessed at 0800 patient rated pain 3 out of 10. No other reports of pain today.  Patient reports not much of an appetite and she declined breakfast. Ate 90% of lunch.  When I came in this morning pt had NC off and O2 sat was 86%. Sat returned to 91-94% on 2L. Off continuous pulse ox per order.  On PO Bactrim.  Up with assist of 1 to the BR. Refusing chair.  Awaiting TCU placement.

## 2025-03-26 NOTE — PROGRESS NOTES
[facility]    Progress Note - Hospitalist Service       Date of Admission:  3/21/2025    Assessment & Plan   Vy Dooley is a 74 year old female admitted on 3/21/2025. She has history of frequent UTIs, mild cognitive impairment, COPD, asthma, GERD, restless leg syndrome, depression and is admitted for E coli bacteremia with urinary source and septic encephalopathy. Clinically improving. Medically ready for discharge, awaiting TCU placement.      E coli bacteremia   Sepsis due to E coli UTI with end organ dysfunction of FLO, FLO now resolved  Septic encephalopathy, improving  H/o mild cognitive impairment  Presented to ED for confusion. Increased urinary frequency but ROS otherwise unrevealing. History of frequent UTIs, history of ESBL E coli. Per her sister, she often gets very confused when she gets them.  Vitals initially stable. Overnight 3/21, became septic with fever, tachycardia, hypoxia. UA suggestive of infection. Urine culture with gram negative bacilli. Blood culture with E coli (1 of 2). Clinical picture consistent with E coli bacteremia with urinary source and septic encephalopathy. Clinically improving.   - Antibiotics:    -  PO Bactrim 3/26 - 3/31. Plan for 10 day total course.  - S/p meropenem 3/21 - 3/23  - S/p ceftriaxone 3/23-3/25  - Discontinued daily labs as they have been stable    Septic encephalopathy, improving  Delirium, improving  Mild cognitive impairment at baseline  Septic encephalopathy   History of mild cognitive impairment. Lives at home with son. Developed agitation overnight 3/23, treated with ativan and haldol, then was very somnolent on 3/24. No further agitation. Mental status much improved 3/25 - alert and oriented to person, place, and situation (not time).  SLUMS on 3/25 was 19.   - Continue to monitor   - Delirium precautions   - Avoid overnight interruptions as able by clustering cares    - Keep awake during day time to optimize sleep at  night.     FLO, resolved   Likely 2/2 dehydration, sepsis. Cr now at baseline 0.8-0.9.     Goals of care   Per son Juancarlos, patient is full code. Would want all medical care including pressors, ICU, intubation, resuscitation.   Per sister Grace, Vy has had difficulty taking care of herself, she lives with her son who has said he will take care of her but he sometimes is unable to provide the help she needs.  She has frequent UTIs and often gets very confused when she gets them.  Vy has refused HAR[facility] before.  - Full code     Discharge planning  Mild cognitive impairment reported at baseline. Lives with son and his girlfriend. Does not get additional help at home. PT recommending TCU. Patient adamantly declines TCU and wants to discharge home. OT completed SLUMS on 3/25 which was 19. Based on this, patient cannot make her own medical decisions. Discussed with son who agrees with recommendation for TCU.   - PT/OT/CM   - Son looking at TCU options, referrals will likely be sent out today   - Consider home eval  - Consider walker/cane at discharge     History of COPD, Asthma on home oxygen  History of COPD on Spiriva and PRN albuterol. Has been out of home inhalers. Is supposed to be on 1-2L of oxygen at home but per son she rarely uses it. Here, patient hypoxic requiring 1-2L. Intermittent wheezing that responds well to neb.   - LAMA (incruse) daily   - Duonebs QID  - Oxygen PRN        Diet: Combination Diet Regular Diet Adult  Snacks/Supplements Adult: Ensure Enlive; Between Meals    DVT Prophylaxis: Enoxaparin (Lovenox) SQ  Muniz Catheter: Not present  Fluids: po  Lines: None     Cardiac Monitoring: None  Code Status: Full Code      Clinically Significant Risk Factors          # Hyperchloremia: Highest Cl = 110 mmol/L in last 2 days, will monitor as appropriate                          # Severe Malnutrition: based on nutrition assessment and treatment provided per dietitian's recommendations.    # Asthma:  noted on problem list        Social Drivers of Health   Housing Stability: Low Risk  (3/23/2025)    Housing Stability     Do you have housing? : Yes     Are you worried about losing your housing?: No   Recent Concern: Housing Stability - High Risk (3/21/2025)    Housing Stability     Do you have housing? : No   Tobacco Use: Medium Risk (10/11/2024)    Received from [facility]    Patient History     Smoking Tobacco Use: Former     Smokeless Tobacco Use: Never         Disposition Plan     Medically Ready for Discharge: Anticipated in 2-4 Days       The patient's care was discussed with the Attending Physician, Dr. lunsford .    Collette Landa MD  Hospitalist Service  [facility]  Securely message with [employer] (more info)  Text page via [employer] Paging/Directory   ______________________________________________________________________    Interval History     Doing well this morning. Some vague body aches. Denies shortness of breath, chest pain, dysuria.     Physical Exam   Vital Signs: Temp: 98.2  F (36.8  C) Temp src: Oral BP: 130/76 Pulse: 82   Resp: 16 SpO2: 95 % O2 Device: Nasal cannula Oxygen Delivery: 1 LPM  Weight: 133 lbs 6.05 oz    GENERAL:, alert and no distress, conversational   RESP: lungs clear, speaking in full sentences, normal work of breathing, LCTAB, no wheezes or crackles   CV: RRR, faint systolic murmur, extremities appear well perfused  ABDOMEN: soft, nontender  MS: no gross musculoskeletal defects noted, no edema  PSYCH: mentation appears normal, affect normal  NEURO: Oriented to person, place, and situation (knows she is hospitalized for a UTI). Not oriented to time.       Data         Imaging results reviewed over the past 24 hrs:   No results found for this or any previous visit (from the past 24 hours).

## 2025-03-26 NOTE — PROGRESS NOTES
Care Management Follow Up    Length of Stay (days): 5    Expected Discharge Date: 03/27/2025    Anticipated Discharge Plan:  Transitional Care    Transportation: TBD    PT Recommendations: Transitional Care Facility  OT Recommendations:  Transitional Care Facility     Barriers to Discharge: placement    Prior Living Situation: house with child(reba), adult    Discussed  Partnership in Safe Discharge Planning  document with patient/family: No     Handoff Completed: No, handoff not indicated or clinically appropriate    Patient/Spokesperson Updated: Yes. Who? Son     Additional Information:  See below    Next Steps: continue to follow     Theresa Drake RN        Spoke to son via telephone. He is reviewing TCU list and will call CM back with choices.    12:33 PM  Called son to get TCU choices. Says he will be here in about an hour and will discuss TCU choices then  . For now he wants a referral to [facility]    3:10 PM  Met with son. He thinks patient is doing better cognitively today . Is wondering about a repeat SLUMS. Patient is agreeable to participate in therapy this afternoon. Son  is agreeable to referral to [facility]. Referral sent.

## 2025-03-26 NOTE — UTILIZATION REVIEW
Inpatient appropriate    Admission Status; Secondary Review Determination       Under the authority of the Utilization Management Committee, the utilization review process indicated a secondary review on the above patient. The review outcome is based on review of the medical records, discussions with staff, and applying clinical experience noted on the date of the review.     (x) Inpatient Status Appropriate - This patient's medical care is consistent with medical management for inpatient care and reasonable inpatient medical practice.     RATIONALE FOR DETERMINATION   Reason for review: insurance denial  74 year old female with history of frequent UTIs, mild cognitive impairment, COPD, asthma, GERD, restless leg syndrome, depression admitted for altered mental status. Found to have sepsis due to UTI and bacteremia  Meet inpt criteria with AMS requiring 1:1 sitter, bacteremia, hemodynamic instability with hypotension requiring IVF bolus on 3/22 and also FLO   At the time of admission with the information available to the attending physician more than 2 nights Hospital complex care was anticipated, based on patient risk of adverse outcome if treated as outpatient and complex care required. Inpatient admission is appropriate based on the Medicare guidelines.     The information on this document is developed by the utilization review team in order for the business office to ensure compliance. This only denotes the appropriateness of proper admission status and does not reflect the quality of care rendered.   The definitions of Inpatient Status and Observation Status used in making the determination above are those provided in the CMS Coverage Manual, Chapter 1 and Chapter 6, section 70.4.   Sincerely,   Carrillo Caraballo MD  Utilization Review  Physician Advisor  [facility].

## 2025-03-27 ENCOUNTER — APPOINTMENT (OUTPATIENT)
Dept: OCCUPATIONAL THERAPY | Facility: HOSPITAL | Age: 75
End: 2025-03-27
Payer: COMMERCIAL

## 2025-03-27 VITALS
RESPIRATION RATE: 16 BRPM | HEART RATE: 79 BPM | WEIGHT: 133.38 LBS | SYSTOLIC BLOOD PRESSURE: 94 MMHG | DIASTOLIC BLOOD PRESSURE: 54 MMHG | BODY MASS INDEX: 19.7 KG/M2 | OXYGEN SATURATION: 92 % | TEMPERATURE: 98.5 F

## 2025-03-27 LAB
CREAT SERPL-MCNC: 1.08 MG/DL (ref 0.51–0.95)
EGFRCR SERPLBLD CKD-EPI 2021: 54 ML/MIN/1.73M2
PLATELET # BLD AUTO: 242 10E3/UL (ref 150–450)

## 2025-03-27 PROCEDURE — 250N000009 HC RX 250

## 2025-03-27 PROCEDURE — 99232 SBSQ HOSP IP/OBS MODERATE 35: CPT | Mod: GC

## 2025-03-27 PROCEDURE — 36415 COLL VENOUS BLD VENIPUNCTURE: CPT | Performed by: FAMILY MEDICINE

## 2025-03-27 PROCEDURE — 250N000013 HC RX MED GY IP 250 OP 250 PS 637

## 2025-03-27 PROCEDURE — 85049 AUTOMATED PLATELET COUNT: CPT | Performed by: FAMILY MEDICINE

## 2025-03-27 PROCEDURE — 97129 THER IVNTJ 1ST 15 MIN: CPT | Mod: GO

## 2025-03-27 PROCEDURE — 120N000001 HC R&B MED SURG/OB

## 2025-03-27 PROCEDURE — 999N000157 HC STATISTIC RCP TIME EA 10 MIN

## 2025-03-27 PROCEDURE — 82565 ASSAY OF CREATININE: CPT | Performed by: FAMILY MEDICINE

## 2025-03-27 PROCEDURE — 97535 SELF CARE MNGMENT TRAINING: CPT | Mod: GO

## 2025-03-27 PROCEDURE — 250N000013 HC RX MED GY IP 250 OP 250 PS 637: Performed by: FAMILY MEDICINE

## 2025-03-27 PROCEDURE — 250N000011 HC RX IP 250 OP 636

## 2025-03-27 PROCEDURE — 94640 AIRWAY INHALATION TREATMENT: CPT | Mod: 76

## 2025-03-27 RX ADMIN — ACETAMINOPHEN 650 MG: 325 TABLET ORAL at 05:28

## 2025-03-27 RX ADMIN — IPRATROPIUM BROMIDE AND ALBUTEROL SULFATE 3 ML: .5; 3 SOLUTION RESPIRATORY (INHALATION) at 20:21

## 2025-03-27 RX ADMIN — IPRATROPIUM BROMIDE AND ALBUTEROL SULFATE 3 ML: .5; 3 SOLUTION RESPIRATORY (INHALATION) at 15:32

## 2025-03-27 RX ADMIN — ROPINIROLE HYDROCHLORIDE 2 MG: 1 TABLET, FILM COATED ORAL at 05:35

## 2025-03-27 RX ADMIN — UMECLIDINIUM 1 PUFF: 62.5 AEROSOL, POWDER ORAL at 08:30

## 2025-03-27 RX ADMIN — ONDANSETRON 4 MG: 4 TABLET, ORALLY DISINTEGRATING ORAL at 11:54

## 2025-03-27 RX ADMIN — SULFAMETHOXAZOLE AND TRIMETHOPRIM 1 TABLET: 800; 160 TABLET ORAL at 20:13

## 2025-03-27 RX ADMIN — ALBUTEROL SULFATE 2 PUFF: 90 AEROSOL, METERED RESPIRATORY (INHALATION) at 05:33

## 2025-03-27 RX ADMIN — IPRATROPIUM BROMIDE AND ALBUTEROL SULFATE 3 ML: .5; 3 SOLUTION RESPIRATORY (INHALATION) at 09:31

## 2025-03-27 RX ADMIN — ACETAMINOPHEN 650 MG: 325 TABLET ORAL at 20:13

## 2025-03-27 RX ADMIN — ENOXAPARIN SODIUM 40 MG: 40 INJECTION SUBCUTANEOUS at 20:15

## 2025-03-27 RX ADMIN — PANTOPRAZOLE SODIUM 40 MG: 40 TABLET, DELAYED RELEASE ORAL at 08:30

## 2025-03-27 RX ADMIN — SULFAMETHOXAZOLE AND TRIMETHOPRIM 1 TABLET: 800; 160 TABLET ORAL at 08:30

## 2025-03-27 ASSESSMENT — ACTIVITIES OF DAILY LIVING (ADL)
ADLS_ACUITY_SCORE: 49
ADLS_ACUITY_SCORE: 48
ADLS_ACUITY_SCORE: 48
ADLS_ACUITY_SCORE: 49
ADLS_ACUITY_SCORE: 50
ADLS_ACUITY_SCORE: 48
ADLS_ACUITY_SCORE: 51
ADLS_ACUITY_SCORE: 49
ADLS_ACUITY_SCORE: 50
ADLS_ACUITY_SCORE: 49
ADLS_ACUITY_SCORE: 49
ADLS_ACUITY_SCORE: 50
ADLS_ACUITY_SCORE: 50
ADLS_ACUITY_SCORE: 48
ADLS_ACUITY_SCORE: 50

## 2025-03-27 NOTE — PROGRESS NOTES
[facility]    Progress Note - Hospitalist Service       Date of Admission:  3/21/2025    Assessment & Plan   Vy Dooley is a 74 year old female admitted on 3/21/2025. She has history of frequent UTIs, mild cognitive impairment, COPD, asthma, GERD, restless leg syndrome, depression and is admitted for E coli bacteremia with urinary source and septic encephalopathy. Clinically improving. Medically ready for discharge, awaiting TCU placement.      E coli bacteremia   Sepsis due to E coli UTI with end organ dysfunction of FLO, FLO now resolved  Septic encephalopathy, improving  H/o mild cognitive impairment  Presented to ED for confusion. Increased urinary frequency but ROS otherwise unrevealing. History of frequent UTIs, history of ESBL E coli. Per her sister, she often gets very confused when she gets them.  Vitals initially stable. Overnight 3/21, became septic with fever, tachycardia, hypoxia. UA suggestive of infection. Urine culture with gram negative bacilli. Blood culture with E coli (1 of 2). Clinical picture consistent with E coli bacteremia with urinary source and septic encephalopathy. Clinically improving.   - Antibiotics:    - PO Bactrim 3/26 - 3/31. Plan for 10 day total course.  - S/p meropenem 3/21 - 3/23  - S/p ceftriaxone 3/23-3/25  - Discontinued daily labs as they have been stable    Septic encephalopathy, improving  Delirium, improving  Mild cognitive impairment at baseline  Septic encephalopathy   History of mild cognitive impairment. Lives at home with son. Developed agitation overnight 3/23, treated with ativan and haldol, then was very somnolent on 3/24. No further agitation. Mental status much improved 3/25 - alert and oriented to person, place, and situation (not time).  SLUMS on 3/25 was 19.   - Continue to monitor   - Delirium precautions   - Avoid overnight interruptions as able by clustering cares    - Keep awake during day time to optimize sleep at  night.     FLO, resolved   Likely 2/2 dehydration, sepsis. Cr now at baseline 0.8-0.9.     Goals of care   Per son Juancarlos, patient is full code. Would want all medical care including pressors, ICU, intubation, resuscitation.   - Full code     Discharge planning  Mild cognitive impairment reported at baseline. Lives with son and his girlfriend. Does not get additional help at home. PT recommending TCU. Patient adamantly declines TCU and wants to discharge home. OT completed SLUMS on 3/25 which was 19. Based on this, patient cannot make her own medical decisions. Discussed with son who agrees with recommendation for TCU.   - PT/OT/CM   - Son looking at TCU options  - Consider home eval  - Consider walker/cane at discharge     History of COPD, Asthma on home oxygen  History of COPD on Spiriva and PRN albuterol. Has been out of home inhalers. Is supposed to be on 1-2L of oxygen at home but per son she rarely uses it. Here, patient hypoxic requiring 1-2L. Intermittent wheezing that responds well to neb.   - LAMA (incruse) daily   - Duonebs QID  - Oxygen PRN        Diet: Combination Diet Regular Diet Adult  Snacks/Supplements Adult: Ensure Enlive; Between Meals    DVT Prophylaxis: Enoxaparin (Lovenox) SQ  Muniz Catheter: Not present  Fluids: po  Lines: None     Cardiac Monitoring: None  Code Status: Full Code      Clinically Significant Risk Factors                               # Severe Malnutrition: based on nutrition assessment and treatment provided per dietitian's recommendations.    # Asthma: noted on problem list        Social Drivers of Health   Housing Stability: Low Risk  (3/23/2025)    Housing Stability     Do you have housing? : Yes     Are you worried about losing your housing?: No   Recent Concern: Housing Stability - High Risk (3/21/2025)    Housing Stability     Do you have housing? : No   Tobacco Use: Medium Risk (10/11/2024)    [facility]    Patient  History     Smoking Tobacco Use: Former     Smokeless Tobacco Use: Never         Disposition Plan     Medically Ready for Discharge: Anticipated in 2-4 Days       The patient's care was discussed with the Attending Physician, Dr. lunsford .    Collette Landa MD  Hospitalist Service  [facility]  Securely message with [employer] (more info)  Text page via [employer] Paging/Directory   ______________________________________________________________________    Interval History     Doing well this morning. Some vague body aches. Denies shortness of breath, chest pain, dysuria, URI symptoms.     Physical Exam   Vital Signs: Temp: 98.2  F (36.8  C) Temp src: Oral BP: 120/67 Pulse: 82   Resp: 20 SpO2: 96 % O2 Device: None (Room air) Oxygen Delivery: 2 LPM  Weight: 133 lbs 6.05 oz    GENERAL:, alert and no distress, conversational   RESP: lungs clear, speaking in full sentences, normal work of breathing, LCTAB, no wheezes or crackles   CV: RRR, faint systolic murmur, extremities appear well perfused  ABDOMEN: soft, nontender  MS: no gross musculoskeletal defects noted, no edema  PSYCH: mentation appears normal, affect normal  NEURO: Oriented to person, place, and situation (knows she is hospitalized for a UTI). Not oriented to time.       Data     I have personally reviewed the following data over the past 24 hrs:    N/A  \   N/A   / 242     N/A N/A N/A /  N/A   N/A N/A 1.08 (H) \       Imaging results reviewed over the past 24 hrs:   No results found for this or any previous visit (from the past 24 hours).

## 2025-03-27 NOTE — PLAN OF CARE
"Goal Outcome Evaluation:             VSS.  Slept thru shift.  Up to br and used call light.  SOB this am.  Inhaler given.  Requip for restless legs and tylenol for comfort given this am.  IV leaking,  removed.  No iv meds ordered so will leave out. Awaiting TCU placement. Care plan reviewed.  Problem: Adult Inpatient Plan of Care  Goal: Plan of Care Review  Description: The Plan of Care Review/Shift note should be completed every shift.  The Outcome Evaluation is a brief statement about your assessment that the patient is improving, declining, or no change.  This information will be displayed automatically on your shiftnote.  3/27/2025 0435 by eNlsy Johnson RN  Outcome: Progressing  3/27/2025 0435 by Nelsy Johnson RN  Outcome: Not Progressing  Goal: Patient-Specific Goal (Individualized)  Description: You can add care plan individualizations to a care plan. Examples of Individualization might be:  \"Parent requests to be called daily at 9am for status\", \"I have a hard time hearing out of my right ear\", or \"Do not touch me to wake me up as it startlesme\".  3/27/2025 0435 by Nelsy Johnson RN  Outcome: Progressing  3/27/2025 0435 by Nelsy Johnson RN  Outcome: Not Progressing  Goal: Absence of Hospital-Acquired Illness or Injury  3/27/2025 0435 by Nelsy Johnson RN  Outcome: Progressing  3/27/2025 0435 by Nelsy Johnson RN  Outcome: Not Progressing  Intervention: Identify and Manage Fall Risk  Recent Flowsheet Documentation  Taken 3/27/2025 0000 by Nelsy Johnson RN  Safety Promotion/Fall Prevention: activity supervised  Intervention: Prevent Skin Injury  Recent Flowsheet Documentation  Taken 3/27/2025 0000 by Nelsy Johnson RN  Body Position: position changed independently  Goal: Optimal Comfort and Wellbeing  3/27/2025 0435 by Nelsy Johnson RN  Outcome: Progressing  3/27/2025 0435 by Nelsy Johnson RN  Outcome: Not " Progressing  Goal: Readiness for Transition of Care  3/27/2025 0435 by Nelsy Johnson RN  Outcome: Progressing  3/27/2025 0435 by Nelsy Johnson RN  Outcome: Not Progressing     Problem: Delirium  Goal: Optimal Coping  3/27/2025 0435 by Nelsy Johnson RN  Outcome: Progressing  3/27/2025 0435 by Nelsy Johnson RN  Outcome: Not Progressing  Goal: Improved Behavioral Control  3/27/2025 0435 by Nelsy Johnson RN  Outcome: Progressing  3/27/2025 0435 by Nelsy Johnson RN  Outcome: Not Progressing  Intervention: Minimize Safety Risk  Recent Flowsheet Documentation  Taken 3/27/2025 0000 by Nelsy Johnson RN  Enhanced Safety Measures: room near unit station  Goal: Improved Attention and Thought Clarity  3/27/2025 0435 by Nelsy Johnson RN  Outcome: Progressing  3/27/2025 0435 by Nelsy Johnson RN  Outcome: Not Progressing  Intervention: Maximize Cognitive Function  Recent Flowsheet Documentation  Taken 3/27/2025 0000 by Nelsy Johnson RN  Reorientation Measures: clock in view  Goal: Improved Sleep  3/27/2025 0435 by Nelsy Johnson RN  Outcome: Progressing  3/27/2025 0435 by Nelsy Johnson RN  Outcome: Not Progressing     Problem: Infection  Goal: Absence of Infection Signs and Symptoms  3/27/2025 0435 by Nelsy Johnson RN  Outcome: Progressing  3/27/2025 0435 by Nelsy Johnson RN  Outcome: Not Progressing     Problem: Gas Exchange Impaired  Goal: Optimal Gas Exchange  3/27/2025 0435 by Nelsy Johnson RN  Outcome: Progressing  3/27/2025 0435 by Nelsy Johnson RN  Outcome: Not Progressing

## 2025-03-27 NOTE — PLAN OF CARE
Problem: Adult Inpatient Plan of Care  Goal: Plan of Care Review  Description: The Plan of Care Review/Shift note should be completed every shift.  The Outcome Evaluation is a brief statement about your assessment that the patient is improving, declining, or no change.  This information will be displayed automatically on your shiftnote.  Outcome: Progressing  Flowsheets (Taken 3/27/2025 1159)  Plan of Care Reviewed With: patient   Goal Outcome Evaluation:      Plan of Care Reviewed With: patient        Patient up with SBA.  Mildly unsteady, patient declines walker.  Gait belt in room.    Patient reporting not feeling well but declining interventions.  Vague in S/S stating stomach not feeling well.  Frequent urination noted throughout the day.    Alarm on bed as patient is at risk for falling.  Reinforced education to utilize call light when up.  Poor sleep reported by patient.  Patient rested in am hoping to get a nap.    Patient waiting placement.  Oral antibiotics continued.

## 2025-03-27 NOTE — PROGRESS NOTES
Care Management Follow Up    Length of Stay (days): 6    Expected Discharge Date: 03/28/2025     Concerns to be Addressed:     Care progression - discharge planning  Patient plan of care discussed at interdisciplinary rounds: Yes    Anticipated Discharge Disposition: PT/OT rec Transitional Care     Anticipated Discharge Services:  Transitional care  Anticipated Discharge DME:  NA    Patient/family educated on Medicare website which has current facility and service quality ratings:  yes  Education Provided on the Discharge Plan:  yes per team  Patient/Family in Agreement with the Plan:  Yes    Referrals Placed by CM/SW:  Yes  Private pay costs discussed: Not applicable    Discussed  Partnership in Safe Discharge Planning  document with patient/family: Yes:  patient's sonJuancarlos     Handoff Completed: No, handoff not indicated or clinically appropriate    Additional Information:  4721 rec'd a call from Dr. Landa, would like PT/OT to re-eval for discharge rec.    Paged PT to give the above message.    Called and spoke with patient's son, Juancarlos, to update on TCU and requested for more TCU choices for referrals.  Juancarlos said he is not available at this time, but will look at the TCU list and call back with more choices.    Next Steps: RNCM to follow for medical progression, recommendations, and final discharge plan.     Loly Mendez, RN     1205 patient's son returned call with 3 more TCU choices to send referrals: [facility], [facility] and [facility].    1404 sent a message to update Dr. Landa on the PT/OT re-eval.    Called to update patient's son, Juancarlos, regarding the PT/OT re-eval and TCU declined. Discuss for Claudygraysonwillem to talk with patient regarding other options for discharge planning: home with home care, [facility], memory care or LTC, etc.  Juancarlos said he will talk with patient and update CM tomorrow.

## 2025-03-27 NOTE — PLAN OF CARE
Problem: Gas Exchange Impaired  Goal: Optimal Gas Exchange  Outcome: Progressing     Problem: Adult Inpatient Plan of Care  Goal: Optimal Comfort and Wellbeing  Outcome: Progressing     Problem: Delirium  Goal: Improved Behavioral Control  Outcome: Progressing   Goal Outcome Evaluation:  Patient's sats was 83% room air, EVELINE tried to start her on 02, patient refused, states she was told by someone that she does not need the 02, writer started 02@2l, sats has been in the mid and upper 90's, has been taking 02 off, needed encouragement to order super, had tylenol prn per her request for abdominal pain, was helpful,  alert and oriented but forgetfull, vitals stable.

## 2025-03-28 ENCOUNTER — APPOINTMENT (OUTPATIENT)
Dept: PHYSICAL THERAPY | Facility: HOSPITAL | Age: 75
End: 2025-03-28
Payer: COMMERCIAL

## 2025-03-28 ENCOUNTER — APPOINTMENT (OUTPATIENT)
Dept: OCCUPATIONAL THERAPY | Facility: HOSPITAL | Age: 75
End: 2025-03-28
Payer: COMMERCIAL

## 2025-03-28 LAB
ANION GAP SERPL CALCULATED.3IONS-SCNC: 11 MMOL/L (ref 7–15)
BUN SERPL-MCNC: 16 MG/DL (ref 8–23)
CALCIUM SERPL-MCNC: 8.7 MG/DL (ref 8.8–10.4)
CHLORIDE SERPL-SCNC: 105 MMOL/L (ref 98–107)
CREAT SERPL-MCNC: 1.14 MG/DL (ref 0.51–0.95)
EGFRCR SERPLBLD CKD-EPI 2021: 51 ML/MIN/1.73M2
GLUCOSE SERPL-MCNC: 111 MG/DL (ref 70–99)
HCO3 SERPL-SCNC: 27 MMOL/L (ref 22–29)
POTASSIUM SERPL-SCNC: 4.3 MMOL/L (ref 3.4–5.3)
SODIUM SERPL-SCNC: 143 MMOL/L (ref 135–145)

## 2025-03-28 PROCEDURE — 120N000001 HC R&B MED SURG/OB

## 2025-03-28 PROCEDURE — 99231 SBSQ HOSP IP/OBS SF/LOW 25: CPT | Mod: GC

## 2025-03-28 PROCEDURE — 97535 SELF CARE MNGMENT TRAINING: CPT | Mod: GO

## 2025-03-28 PROCEDURE — 250N000013 HC RX MED GY IP 250 OP 250 PS 637: Performed by: FAMILY MEDICINE

## 2025-03-28 PROCEDURE — 250N000011 HC RX IP 250 OP 636

## 2025-03-28 PROCEDURE — 250N000009 HC RX 250

## 2025-03-28 PROCEDURE — 250N000013 HC RX MED GY IP 250 OP 250 PS 637

## 2025-03-28 PROCEDURE — 97116 GAIT TRAINING THERAPY: CPT | Mod: GP

## 2025-03-28 PROCEDURE — 999N000156 HC STATISTIC RCP CONSULT EA 30 MIN

## 2025-03-28 PROCEDURE — 80048 BASIC METABOLIC PNL TOTAL CA: CPT | Performed by: STUDENT IN AN ORGANIZED HEALTH CARE EDUCATION/TRAINING PROGRAM

## 2025-03-28 PROCEDURE — 36415 COLL VENOUS BLD VENIPUNCTURE: CPT | Performed by: STUDENT IN AN ORGANIZED HEALTH CARE EDUCATION/TRAINING PROGRAM

## 2025-03-28 PROCEDURE — 999N000157 HC STATISTIC RCP TIME EA 10 MIN

## 2025-03-28 PROCEDURE — 94640 AIRWAY INHALATION TREATMENT: CPT | Mod: 76

## 2025-03-28 PROCEDURE — 94640 AIRWAY INHALATION TREATMENT: CPT

## 2025-03-28 RX ADMIN — UMECLIDINIUM 1 PUFF: 62.5 AEROSOL, POWDER ORAL at 09:30

## 2025-03-28 RX ADMIN — Medication 1 MG: at 20:48

## 2025-03-28 RX ADMIN — ENOXAPARIN SODIUM 40 MG: 40 INJECTION SUBCUTANEOUS at 20:48

## 2025-03-28 RX ADMIN — SULFAMETHOXAZOLE AND TRIMETHOPRIM 1 TABLET: 800; 160 TABLET ORAL at 09:30

## 2025-03-28 RX ADMIN — IPRATROPIUM BROMIDE AND ALBUTEROL SULFATE 3 ML: .5; 3 SOLUTION RESPIRATORY (INHALATION) at 12:04

## 2025-03-28 RX ADMIN — IPRATROPIUM BROMIDE AND ALBUTEROL SULFATE 3 ML: .5; 3 SOLUTION RESPIRATORY (INHALATION) at 08:37

## 2025-03-28 RX ADMIN — PANTOPRAZOLE SODIUM 40 MG: 40 TABLET, DELAYED RELEASE ORAL at 06:33

## 2025-03-28 RX ADMIN — IPRATROPIUM BROMIDE AND ALBUTEROL SULFATE 3 ML: .5; 3 SOLUTION RESPIRATORY (INHALATION) at 17:12

## 2025-03-28 RX ADMIN — SULFAMETHOXAZOLE AND TRIMETHOPRIM 1 TABLET: 800; 160 TABLET ORAL at 20:48

## 2025-03-28 RX ADMIN — CALCIUM CARBONATE (ANTACID) CHEW TAB 500 MG 1000 MG: 500 CHEW TAB at 14:34

## 2025-03-28 ASSESSMENT — ACTIVITIES OF DAILY LIVING (ADL)
ADLS_ACUITY_SCORE: 51
ADLS_ACUITY_SCORE: 50
ADLS_ACUITY_SCORE: 51
ADLS_ACUITY_SCORE: 51
ADLS_ACUITY_SCORE: 50
ADLS_ACUITY_SCORE: 50
ADLS_ACUITY_SCORE: 51
ADLS_ACUITY_SCORE: 50
ADLS_ACUITY_SCORE: 51
ADLS_ACUITY_SCORE: 50
ADLS_ACUITY_SCORE: 50
ADLS_ACUITY_SCORE: 51
ADLS_ACUITY_SCORE: 50

## 2025-03-28 NOTE — PLAN OF CARE
Problem: Gas Exchange Impaired  Goal: Optimal Gas Exchange  Outcome: Progressing  Intervention: Optimize Oxygenation and Ventilation  Recent Flowsheet Documentation  Taken 3/28/2025 0000 by Chela Cardenas RN  Head of Bed (HOB) Positioning: HOB at 30 degrees     Problem: Infection  Goal: Absence of Infection Signs and Symptoms  Outcome: Progressing     Problem: Delirium  Goal: Improved Sleep  Outcome: Progressing   Goal Outcome Evaluation:      Plan of Care Reviewed With: patient    Overall Patient Progress: improvingOverall Patient Progress: improving     Pt A and O4. Standby assist. VSS on 2L NC. Denies pain. Resting comfortably overnight.

## 2025-03-28 NOTE — PLAN OF CARE
Problem: Adult Inpatient Plan of Care  Goal: Plan of Care Review  Description: The Plan of Care Review/Shift note should be completed every shift.  The Outcome Evaluation is a brief statement about your assessment that the patient is improving, declining, or no change.  This information will be displayed automatically on your shiftnote.  Outcome: Progressing  Flowsheets (Taken 3/28/2025 1203)  Plan of Care Reviewed With: patient   Goal Outcome Evaluation:      Plan of Care Reviewed With: patient        Patient has intermittent confusion and forgetful.  Alarms on chair and bed for safety.    Decreased o2 with activity.  Placed on small amount of o2 and albuterol inhaler improved levels.  Updated MD o2 eval to be completed when patient up with therapy this afternoon.    Patient more cooperative today.  Eating meals and up to chair.  Will continue to encourage activity.  PT/OT working with patient.    Patient waiting TCU placement.

## 2025-03-28 NOTE — SIGNIFICANT EVENT
Patient has been assessed for Home Oxygen needs. Oxygen readings:    *Pulse oximetry (SpO2) = 90 % on room air at rest while awake.    *SpO2 = 82% on room air during activity/with exercise.    *SpO2 improved to 92% on 2 liters/minute during activity/with exercise.

## 2025-03-28 NOTE — PROGRESS NOTES
"Care Management Follow Up    Length of Stay (days): 7    Expected Discharge Date: 03/29/2025     Concerns to be Addressed:     Discharge planning    Patient plan of care discussed at interdisciplinary rounds: Yes    Anticipated Discharge Disposition: Transitional Care     Anticipated Discharge Services:  TBD    Anticipated Discharge DME:  TBD    Patient/family educated on Medicare website which has current facility and service quality ratings:  yes    Education Provided on the Discharge Plan:  yes    Patient/Family in Agreement with the Plan:  yes    Referrals Placed by CM/SW:  TCU    Private pay costs discussed: private room/amenity fees    Discussed  Partnership in Safe Discharge Planning  document with patient/family: No     Handoff Completed: No, handoff not indicated or clinically appropriate    Additional Information:  RNCM reviewed pt's file. All TCU have declined referral. RNCM met with pt to provide list of [facility] approved TCUs. Needing additional options to choose from. All current referrals have declined. Pt stated she does not want to go to TCU.    Reviewed flowsheets and see pt has been declining PT/OT the past few days.     RNCM contacted pt's son to gather insight and provide feedback. Son is concerned if pt returns home she will have another fall. Shared with son, pt does not want to go to a TCU. Son called pt and pt requested to speak with RNCM.     RNCM called pt and she agreed to a TCU but has concerns about cost. Informed pt the TCU will confirm the stay will be covered by ins. TCUs confirm coverage before they accept pt. RNCM also shared with pt the importance of PT/OT therapies to work on strength, balance, dizziness, and fall prevention. Discussed the potential \"what ifs\" if pt were to fall again and end up with a serious injury from the fall.     Pt understands the importance and has agreed to participate with therapy going forward. RNCM spoke with Mary Reyes from OT and left msg " for PT to provide update on pt and the importance of her decision to now participate in PT and OT     Next Steps: Follow up with family for additional TCU options. Confirm pt is participating in therapies and submit new TCU referrals.     Abiola Escobedo, RN    1130 update rcv'd from provider. Pt is not ready to discharge today. Will needs home O2 assessment.

## 2025-03-28 NOTE — PROGRESS NOTES
SPIRITUAL HEALTH [facility] ([facility])  SPIRITUAL ASSESSMENT Progress Note  [facility]. Unit P2    REFERRAL SOURCE: Length of Stay      Vy confirmed that she is a [employer].  She was primed for discharge and declined an indepth visit because she expected to be discharged momentarily. She wrestled with the question of a visit but in the end decided against it.      PLAN: No plans to follow.      Janie Holland, Ph.D., [facility]      Securely Message with [employer] or [employer] Pager.    Non-urgent needs:  Phone  to leave a message

## 2025-03-28 NOTE — PROGRESS NOTES
[facility]    Progress Note - Hospitalist Service       Date of Admission:  3/21/2025    Assessment & Plan   Vy Dooley is a 74 year old female admitted on 3/21/2025. She has history of frequent UTIs, mild cognitive impairment, COPD, asthma, GERD, restless leg syndrome, depression and is admitted for E coli bacteremia with urinary source and septic encephalopathy. Clinically improving. Medically ready for discharge, awaiting TCU placement.      E coli bacteremia   Sepsis due to E coli UTI with end organ dysfunction of FLO, FLO now resolved  Septic encephalopathy, improving  H/o mild cognitive impairment  Presented to ED for confusion. Increased urinary frequency but ROS otherwise unrevealing. History of frequent UTIs, history of ESBL E coli. Per her sister, she often gets very confused when she gets them.  Vitals initially stable. Overnight 3/21, became septic with fever, tachycardia, hypoxia. UA suggestive of infection. Urine culture with gram negative bacilli. Blood culture with E coli (1 of 2). Clinical picture consistent with E coli bacteremia with urinary source and septic encephalopathy. Clinically improving.   - Antibiotics:               - PO Bactrim 3/26 - 3/31. Plan for 10 day total course.  - S/p meropenem 3/21 - 3/23  - S/p ceftriaxone 3/23-3/25  - Discontinued daily labs as they have been stable     Septic encephalopathy, improving  Delirium, improving  Mild cognitive impairment at baseline  Septic encephalopathy   History of mild cognitive impairment. Lives at home with son. Developed agitation overnight 3/23, treated with ativan and haldol, then was very somnolent on 3/24. No further agitation. Mental status much improved 3/25 - alert and oriented to person, place, and situation (not time).  SLUMS on 3/25 was 19.   - Continue to monitor   - Delirium precautions              - Avoid overnight interruptions as able by clustering cares               - Keep awake during  day time to optimize sleep at night.     FLO, resolved   Likely 2/2 dehydration, sepsis. Cr now at baseline 0.8-0.9.     Goals of care   Per son Juancarlos, patient is full code. Would want all medical care including pressors, ICU, intubation, resuscitation.   - Full code     Discharge planning  Mild cognitive impairment reported at baseline. Lives with son and his girlfriend. Does not get additional help at home. PT recommending TCU. Patient adamantly declines TCU and wants to discharge home. OT completed SLUMS on 3/25 which was 19. Based on this, patient cannot make her own medical decisions. Discussed with son who agrees with recommendation for TCU.   - PT/OT/CM              - Son looking at TCU options  - Consider home eval  - Consider walker/cane at discharge     History of COPD, Asthma on home oxygen  History of COPD on Spiriva and PRN albuterol. Has been out of home inhalers. Is supposed to be on 1-2L of oxygen at home but per son she rarely uses it. Here, patient hypoxic requiring 1-2L. Intermittent wheezing that responds well to neb.   - LAMA (incruse) daily   - Duonebs QID  - Oxygen PRN  - home O2 eval        Diet: Combination Diet Regular Diet Adult  Snacks/Supplements Adult: Ensure Enlive; Between Meals    DVT Prophylaxis: Enoxaparin (Lovenox) SQ  Muniz Catheter: Not present  Fluids: PO  Lines: None     Cardiac Monitoring: None  Code Status: Full Code      Clinically Significant Risk Factors                               # Severe Malnutrition: based on nutrition assessment and treatment provided per dietitian's recommendations.    # Asthma: noted on problem list        Social Drivers of Health   Housing Stability: Low Risk  (3/23/2025)    Housing Stability     Do you have housing? : Yes     Are you worried about losing your housing?: No   Recent Concern: Housing Stability - High Risk (3/21/2025)    Housing Stability     Do you have housing? : No   Tobacco Use: Medium Risk (10/11/2024)    [facility]    Patient History     Smoking Tobacco Use: Former     Smokeless Tobacco Use: Never         Disposition Plan     Medically Ready for Discharge: Ready Now       Precepted patient with Dr. Augusta Robles.      Amanda Silverio MD  Hospitalist Service  [facility]  Securely message with [employer] (more info)  Text page via [employer] Paging/Directory   ______________________________________________________________________    Interval History   NAEON. Still requiring 1-2L, will do home O2 eval to evaluate for dispo with O2. Otherwise feeling great, no concerns.    Physical Exam   Vital Signs: Temp: 98.5  F (36.9  C) Temp src: Oral BP: 133/69 Pulse: 79   Resp: 16 SpO2: (!) 90 % O2 Device: None (Room air) Oxygen Delivery: 2 LPM  Weight: 133 lbs 6.05 oz    General: Alert, no acute distress  Skin: clean, dry, and intact  HEENT: normocephalic, atraumatic, spontaneous eye movement, no injection or icterus, ears and nose normal, no neck masses  Resp: normal work of breathing, minimal crackles present bilaterally, comfortable effort  Cardio: RRR, S1 and S2 present  Abdomen: nondistended, no masses  Extremities: no erythema, no edema  Psych: calm, pleasantly demented      Medical Decision Making   Please see A&P for additional details of medical decision making.      Data   ------------------------- PAST 24 HR DATA REVIEWED -----------------------------------------------    I have personally reviewed the following data over the past 24 hrs:    N/A  \   N/A   / N/A     143 105 16.0 /  111 (H)   4.3 27 1.14 (H) \       Imaging results reviewed over the past 24 hrs:   No results found for this or any previous visit (from the past 24 hours).

## 2025-03-28 NOTE — TREATMENT PLAN
RCAT Treatment Plan    Patient Score: 7  Patient Acuity: 4    Clinical Indication for Therapy: history of bronchospasm and history of mucous producing disease    Therapy Ordered: continue current therapy    Assessment Summary: pt here with bacteremia and septic encephalopathy. She is a former tobacco smoker. CXR 3/21 negative. RR 16-18, LS diminished, NPC, on room air. Discussed with MD. Plan to continue aerosol treatments for now. Will reassess in 72 hours or as needed.      Arjun Pruitt, RT  3/28/2025

## 2025-03-29 ENCOUNTER — APPOINTMENT (OUTPATIENT)
Dept: PHYSICAL THERAPY | Facility: HOSPITAL | Age: 75
End: 2025-03-29
Payer: COMMERCIAL

## 2025-03-29 PROCEDURE — 250N000009 HC RX 250

## 2025-03-29 PROCEDURE — 99231 SBSQ HOSP IP/OBS SF/LOW 25: CPT | Mod: GC

## 2025-03-29 PROCEDURE — 94640 AIRWAY INHALATION TREATMENT: CPT | Mod: 76

## 2025-03-29 PROCEDURE — 250N000013 HC RX MED GY IP 250 OP 250 PS 637

## 2025-03-29 PROCEDURE — 97530 THERAPEUTIC ACTIVITIES: CPT | Mod: GP

## 2025-03-29 PROCEDURE — 97116 GAIT TRAINING THERAPY: CPT | Mod: GP

## 2025-03-29 PROCEDURE — 250N000013 HC RX MED GY IP 250 OP 250 PS 637: Performed by: FAMILY MEDICINE

## 2025-03-29 PROCEDURE — 999N000157 HC STATISTIC RCP TIME EA 10 MIN

## 2025-03-29 PROCEDURE — 250N000011 HC RX IP 250 OP 636

## 2025-03-29 PROCEDURE — 94799 UNLISTED PULMONARY SVC/PX: CPT

## 2025-03-29 PROCEDURE — 120N000001 HC R&B MED SURG/OB

## 2025-03-29 PROCEDURE — 94640 AIRWAY INHALATION TREATMENT: CPT

## 2025-03-29 RX ADMIN — UMECLIDINIUM 1 PUFF: 62.5 AEROSOL, POWDER ORAL at 08:33

## 2025-03-29 RX ADMIN — Medication 1 MG: at 21:26

## 2025-03-29 RX ADMIN — IPRATROPIUM BROMIDE AND ALBUTEROL SULFATE 3 ML: .5; 3 SOLUTION RESPIRATORY (INHALATION) at 15:21

## 2025-03-29 RX ADMIN — PANTOPRAZOLE SODIUM 40 MG: 40 TABLET, DELAYED RELEASE ORAL at 06:35

## 2025-03-29 RX ADMIN — IPRATROPIUM BROMIDE AND ALBUTEROL SULFATE 3 ML: .5; 3 SOLUTION RESPIRATORY (INHALATION) at 19:38

## 2025-03-29 RX ADMIN — IPRATROPIUM BROMIDE AND ALBUTEROL SULFATE 3 ML: .5; 3 SOLUTION RESPIRATORY (INHALATION) at 07:11

## 2025-03-29 RX ADMIN — IPRATROPIUM BROMIDE AND ALBUTEROL SULFATE 3 ML: .5; 3 SOLUTION RESPIRATORY (INHALATION) at 11:24

## 2025-03-29 RX ADMIN — ACETAMINOPHEN 650 MG: 325 TABLET ORAL at 12:09

## 2025-03-29 RX ADMIN — TRAZODONE HYDROCHLORIDE 100 MG: 50 TABLET ORAL at 21:26

## 2025-03-29 RX ADMIN — ENOXAPARIN SODIUM 40 MG: 40 INJECTION SUBCUTANEOUS at 21:19

## 2025-03-29 RX ADMIN — SULFAMETHOXAZOLE AND TRIMETHOPRIM 1 TABLET: 800; 160 TABLET ORAL at 21:18

## 2025-03-29 RX ADMIN — SULFAMETHOXAZOLE AND TRIMETHOPRIM 1 TABLET: 800; 160 TABLET ORAL at 08:33

## 2025-03-29 ASSESSMENT — ACTIVITIES OF DAILY LIVING (ADL)
ADLS_ACUITY_SCORE: 50
ADLS_ACUITY_SCORE: 51
ADLS_ACUITY_SCORE: 50
ADLS_ACUITY_SCORE: 51
ADLS_ACUITY_SCORE: 50
ADLS_ACUITY_SCORE: 51
ADLS_ACUITY_SCORE: 50
ADLS_ACUITY_SCORE: 51
ADLS_ACUITY_SCORE: 50
ADLS_ACUITY_SCORE: 51
ADLS_ACUITY_SCORE: 51

## 2025-03-29 NOTE — PROGRESS NOTES
Care Management Follow Up    Length of Stay (days): 8    Expected Discharge Date: 03/31/2025    Anticipated Discharge Plan:  Transitional Care    Transportation: TBD    PT Recommendations: Transitional Care Facility, Per plan established by the PT  OT Recommendations:  Transitional Care Facility     Barriers to Discharge: placement    Prior Living Situation: house with child(reba), adult    Discussed  Partnership in Safe Discharge Planning  document with patient/family: No     Handoff Completed: No, handoff not indicated or clinically appropriate    Patient/Spokesperson Updated: Yes. Who? SonJuancarlos    Additional Information:  RNCM called pt's sonJuancarlos to see which additional TCU options Rajeshwillem and Vy would consider. Rajeshwillem asked if RNCM would resend the previous referrals since pt is improving on her therapy participation. RNCM confirmed to resend referrals and asked if Juancarlos could still take another look at the list and provide a few more options to RNCM tomorrow. Claudydarryn agreed to call RNCM tomorrow w/ add'l TCU options.     Next Steps: F/up on TCU referrals.     Abiola Escobedo RN

## 2025-03-29 NOTE — PROGRESS NOTES
[facility]    Progress Note - Hospitalist Service       Date of Admission:  3/21/2025    Assessment & Plan   Vy Dooley is a 74 year old female admitted on 3/21/2025. She has history of frequent UTIs, mild cognitive impairment, COPD, asthma, GERD, restless leg syndrome, depression and is admitted for E coli bacteremia with urinary source and septic encephalopathy. Clinically improving. Medically ready for discharge, awaiting TCU placement.      E coli bacteremia   Sepsis due to E coli UTI with end organ dysfunction of FLO, FLO now resolved  Septic encephalopathy, improving  H/o mild cognitive impairment  Presented to ED for confusion. Increased urinary frequency but ROS otherwise unrevealing. History of frequent UTIs, history of ESBL E coli. Per her sister, she often gets very confused when she gets them.  Vitals initially stable. Overnight 3/21, became septic with fever, tachycardia, hypoxia. UA suggestive of infection. Urine culture with gram negative bacilli. Blood culture with E coli (1 of 2). Clinical picture consistent with E coli bacteremia with urinary source and septic encephalopathy. Clinically improving.   - Antibiotics:               - PO Bactrim 3/26 - 3/31. Plan for 10 day total course.  - S/p meropenem 3/21 - 3/23  - S/p ceftriaxone 3/23-3/25  - Discontinued daily labs as they have been stable     Septic encephalopathy, improving  Delirium, improving  Mild cognitive impairment at baseline  Septic encephalopathy   History of mild cognitive impairment. Lives at home with son. Developed agitation overnight 3/23, treated with ativan and haldol, then was very somnolent on 3/24. No further agitation. Mental status much improved 3/25 - alert and oriented to person, place, and situation (not time).  SLUMS on 3/25 was 19.   - Continue to monitor   - Delirium precautions              - Avoid overnight interruptions as able by clustering cares               - Keep awake during  day time to optimize sleep at night.     FLO, resolved   Likely 2/2 dehydration, sepsis. Cr now at baseline 0.8-0.9.     Goals of care   Per son Juancarlos, patient is full code. Would want all medical care including pressors, ICU, intubation, resuscitation.   - Full code     Discharge planning  Mild cognitive impairment reported at baseline. Lives with son and his girlfriend. Does not get additional help at home. PT recommending TCU. Patient adamantly declines TCU and wants to discharge home. OT completed SLUMS on 3/25 which was 19. Based on this, patient cannot make her own medical decisions. Discussed with son who agrees with recommendation for TCU.   - PT/OT/CM              - Son looking at TCU options  - Consider home eval  - Consider walker/cane at discharge     History of COPD, Asthma on home oxygen  History of COPD on Spiriva and PRN albuterol. Has been out of home inhalers. Is supposed to be on 1-2L of oxygen at home but per son she rarely uses it. Here, patient hypoxic requiring 1-2L. Intermittent wheezing that responds well to neb.   - LAMA (incruse) daily   - Duonebs QID  - Oxygen PRN  - home O2 eval        Diet: Combination Diet Regular Diet Adult  Snacks/Supplements Adult: Ensure Enlive; Between Meals    DVT Prophylaxis: Enoxaparin (Lovenox) SQ  Muniz Catheter: Not present  Fluids: po  Lines: None     Cardiac Monitoring: None  Code Status: Full Code      Clinically Significant Risk Factors                               # Severe Malnutrition: based on nutrition assessment and treatment provided per dietitian's recommendations.    # Asthma: noted on problem list        Social Drivers of Health   Housing Stability: Low Risk  (3/23/2025)    Housing Stability     Do you have housing? : Yes     Are you worried about losing your housing?: No   Recent Concern: Housing Stability - High Risk (3/21/2025)    Housing Stability     Do you have housing? : No   Tobacco Use: Medium Risk (10/11/2024)    [facility]    Patient History     Smoking Tobacco Use: Former     Smokeless Tobacco Use: Never         Disposition Plan     Medically Ready for Discharge: Ready Now       The patient's care was discussed with the Attending Physician, Dr. Rosenstein .    Ray Mccauely MD  Hospitalist Service  [facility]  Securely message with [employer] (more info)  Text page via [employer] Paging/Directory   ______________________________________________________________________    Interval History     ROSALBA  Requesting biofreeze lotion    Physical Exam   Vital Signs: Temp: 98.7  F (37.1  C) Temp src: Oral BP: 107/54 Pulse: 89   Resp: 20 SpO2: 92 % O2 Device: Nasal cannula Oxygen Delivery: 1 LPM  Weight: 133 lbs 6.05 oz    GENERAL: healthy, alert and no distress  RESP: lungs clear, speaking in full sentences, normal work of breathing   CV: RRR, extremities well perfused  ABDOMEN: soft, nontender  MS: no gross musculoskeletal defects noted, no edema  PSYCH: affect normal/bright       Data     I have personally reviewed the following data over the past 24 hrs:    N/A  \   N/A   / N/A     143 105 16.0 /  111 (H)   4.3 27 1.14 (H) \       Imaging results reviewed over the past 24 hrs:   No results found for this or any previous visit (from the past 24 hours).

## 2025-03-29 NOTE — PLAN OF CARE
"Goal Outcome Evaluation:       Alert, with intermittent forgetfulness and confusion    Vss on 1L NC    Up SBA to the BR  Problem: Adult Inpatient Plan of Care  Goal: Plan of Care Review  Description: The Plan of Care Review/Shift note should be completed every shift.  The Outcome Evaluation is a brief statement about your assessment that the patient is improving, declining, or no change.  This information will be displayed automatically on your shiftnote.  Outcome: Progressing  Goal: Patient-Specific Goal (Individualized)  Description: You can add care plan individualizations to a care plan. Examples of Individualization might be:  \"Parent requests to be called daily at 9am for status\", \"I have a hard time hearing out of my right ear\", or \"Do not touch me to wake me up as it startlesme\".  Outcome: Progressing  Goal: Absence of Hospital-Acquired Illness or Injury  Outcome: Progressing  Intervention: Identify and Manage Fall Risk  Recent Flowsheet Documentation  Taken 3/29/2025 0100 by Jannette Chang RN  Safety Promotion/Fall Prevention:   activity supervised   assistive device/personal items within reach   clutter free environment maintained   lighting adjusted   nonskid shoes/slippers when out of bed   mobility aid in reach   safety round/check completed  Intervention: Prevent Skin Injury  Recent Flowsheet Documentation  Taken 3/29/2025 0515 by Jannette Chang RN  Body Position: left  Taken 3/29/2025 0315 by Jannette Chang RN  Body Position: right  Taken 3/29/2025 0100 by Jannette Chang RN  Body Position: position changed independently  Goal: Optimal Comfort and Wellbeing  Outcome: Progressing  Goal: Readiness for Transition of Care  Outcome: Progressing     Problem: Delirium  Goal: Optimal Coping  Outcome: Progressing  Goal: Improved Behavioral Control  Outcome: Progressing  Intervention: Minimize Safety Risk  Recent Flowsheet Documentation  Taken 3/29/2025 0100 by Jannette Chang RN  Enhanced Safety Measures:   " room near unit station   pain management  Goal: Improved Attention and Thought Clarity  Outcome: Progressing  Goal: Improved Sleep  Outcome: Progressing     Problem: Infection  Goal: Absence of Infection Signs and Symptoms  Outcome: Progressing     Problem: Gas Exchange Impaired  Goal: Optimal Gas Exchange  Outcome: Progressing  Intervention: Optimize Oxygenation and Ventilation  Recent Flowsheet Documentation  Taken 3/29/2025 0515 by Jannette Chang RN  Head of Bed (HOB) Positioning: HOB at 20-30 degrees  Taken 3/29/2025 0315 by Jannette Chang RN  Head of Bed (HOB) Positioning: HOB at 20-30 degrees  Taken 3/29/2025 0100 by Jannette Chang RN  Head of Bed (HOB) Positioning: HOB at 20-30 degrees

## 2025-03-29 NOTE — PLAN OF CARE
Problem: Adult Inpatient Plan of Care  Goal: Plan of Care Review  Description: The Plan of Care Review/Shift note should be completed every shift.  The Outcome Evaluation is a brief statement about your assessment that the patient is improving, declining, or no change.  This information will be displayed automatically on your shiftnote.  Outcome: Progressing  Flowsheets (Taken 3/29/2025 1036)  Plan of Care Reviewed With: patient   Goal Outcome Evaluation:      Plan of Care Reviewed With: patient    Patient ambulating/up to chair.  Generalized weakness continued.  Up with walker and gait belt/SBA.  Patient currently on oral antibiotics.      Patient's alarm in place but calls appropriately.  Patient oriented but forgetful.      Waiting TCU placement.

## 2025-03-29 NOTE — PLAN OF CARE
Problem: Adult Inpatient Plan of Care  Goal: Plan of Care Review  Description: The Plan of Care Review/Shift note should be completed every shift.  The Outcome Evaluation is a brief statement about your assessment that the patient is improving, declining, or no change.  This information will be displayed automatically on your shiftnote.  Outcome: Progressing     Problem: Delirium  Goal: Improved Sleep  Outcome: Progressing     Problem: Gas Exchange Impaired  Goal: Optimal Gas Exchange  Outcome: Progressing  Intervention: Optimize Oxygenation and Ventilation  Recent Flowsheet Documentation  Taken 3/28/2025 2000 by Robson Wasserman, RN  Head of Bed (HOB) Positioning: HOB at 20-30 degrees   Goal Outcome Evaluation:         Alert and oriented w/int confusion/forgetfulness  VSS on 1 LPM via NC  Denied pain/nausea  Ambulating w/SBA  PRN melatonin given for sleep  Able to make needs known

## 2025-03-29 NOTE — PLAN OF CARE
Problem: Delirium  Goal: Improved Attention and Thought Clarity  Outcome: Progressing  Intervention: Maximize Cognitive Function  Recent Flowsheet Documentation  Taken 3/28/2025 1610 by Yael Sanchez, RN  Reorientation Measures: clock in view     Problem: Infection  Goal: Absence of Infection Signs and Symptoms  Outcome: Progressing  Intervention: Prevent or Manage Infection  Recent Flowsheet Documentation  Taken 3/28/2025 1610 by Yael Sanchez, RN  Isolation Precautions: contact precautions maintained     Problem: Gas Exchange Impaired  Goal: Optimal Gas Exchange  Outcome: Progressing   Goal Outcome Evaluation:         A/O now, forgetful, intermittent confusion. Alarms on for safety.   Pleasant and cooperative with cares.   Pending TCU placement.

## 2025-03-30 LAB
CREAT SERPL-MCNC: 1.33 MG/DL (ref 0.51–0.95)
EGFRCR SERPLBLD CKD-EPI 2021: 42 ML/MIN/1.73M2
PLATELET # BLD AUTO: 312 10E3/UL (ref 150–450)

## 2025-03-30 PROCEDURE — 999N000156 HC STATISTIC RCP CONSULT EA 30 MIN

## 2025-03-30 PROCEDURE — 250N000011 HC RX IP 250 OP 636

## 2025-03-30 PROCEDURE — 250N000013 HC RX MED GY IP 250 OP 250 PS 637

## 2025-03-30 PROCEDURE — 85049 AUTOMATED PLATELET COUNT: CPT | Performed by: FAMILY MEDICINE

## 2025-03-30 PROCEDURE — 82565 ASSAY OF CREATININE: CPT | Performed by: FAMILY MEDICINE

## 2025-03-30 PROCEDURE — 250N000013 HC RX MED GY IP 250 OP 250 PS 637: Performed by: FAMILY MEDICINE

## 2025-03-30 PROCEDURE — 36415 COLL VENOUS BLD VENIPUNCTURE: CPT | Performed by: FAMILY MEDICINE

## 2025-03-30 PROCEDURE — 99232 SBSQ HOSP IP/OBS MODERATE 35: CPT | Mod: GC

## 2025-03-30 PROCEDURE — 120N000001 HC R&B MED SURG/OB

## 2025-03-30 RX ORDER — IPRATROPIUM BROMIDE AND ALBUTEROL SULFATE 2.5; .5 MG/3ML; MG/3ML
3 SOLUTION RESPIRATORY (INHALATION) EVERY 4 HOURS PRN
Status: DISCONTINUED | OUTPATIENT
Start: 2025-03-30 | End: 2025-04-01 | Stop reason: HOSPADM

## 2025-03-30 RX ORDER — LOPERAMIDE HYDROCHLORIDE 2 MG/1
2 CAPSULE ORAL ONCE
Status: COMPLETED | OUTPATIENT
Start: 2025-03-30 | End: 2025-03-30

## 2025-03-30 RX ADMIN — PANTOPRAZOLE SODIUM 40 MG: 40 TABLET, DELAYED RELEASE ORAL at 09:51

## 2025-03-30 RX ADMIN — Medication 1 LOZENGE: at 14:30

## 2025-03-30 RX ADMIN — ALBUTEROL SULFATE 2 PUFF: 90 AEROSOL, METERED RESPIRATORY (INHALATION) at 09:55

## 2025-03-30 RX ADMIN — Medication 1 LOZENGE: at 17:23

## 2025-03-30 RX ADMIN — ACETAMINOPHEN 650 MG: 325 TABLET ORAL at 06:05

## 2025-03-30 RX ADMIN — ALBUTEROL SULFATE 2 PUFF: 90 AEROSOL, METERED RESPIRATORY (INHALATION) at 20:30

## 2025-03-30 RX ADMIN — ENOXAPARIN SODIUM 40 MG: 40 INJECTION SUBCUTANEOUS at 20:29

## 2025-03-30 RX ADMIN — Medication 1 MG: at 20:40

## 2025-03-30 RX ADMIN — ROPINIROLE HYDROCHLORIDE 2 MG: 1 TABLET, FILM COATED ORAL at 20:29

## 2025-03-30 RX ADMIN — LOPERAMIDE HYDROCHLORIDE 2 MG: 2 CAPSULE ORAL at 13:09

## 2025-03-30 RX ADMIN — UMECLIDINIUM 1 PUFF: 62.5 AEROSOL, POWDER ORAL at 09:50

## 2025-03-30 RX ADMIN — Medication 1 LOZENGE: at 20:30

## 2025-03-30 ASSESSMENT — ACTIVITIES OF DAILY LIVING (ADL)
ADLS_ACUITY_SCORE: 51
ADLS_ACUITY_SCORE: 53
ADLS_ACUITY_SCORE: 54
ADLS_ACUITY_SCORE: 51
ADLS_ACUITY_SCORE: 51
ADLS_ACUITY_SCORE: 54
ADLS_ACUITY_SCORE: 51
ADLS_ACUITY_SCORE: 53
ADLS_ACUITY_SCORE: 51
ADLS_ACUITY_SCORE: 53
ADLS_ACUITY_SCORE: 53
ADLS_ACUITY_SCORE: 51
ADLS_ACUITY_SCORE: 53
ADLS_ACUITY_SCORE: 53
ADLS_ACUITY_SCORE: 51
ADLS_ACUITY_SCORE: 53
ADLS_ACUITY_SCORE: 54
ADLS_ACUITY_SCORE: 53
ADLS_ACUITY_SCORE: 53

## 2025-03-30 NOTE — PROGRESS NOTES
[facility]    Progress Note - Hospitalist Service       Date of Admission:  3/21/2025    Assessment & Plan   Vy Dooley is a 74 year old female admitted on 3/21/2025. She has history of frequent UTIs, mild cognitive impairment, COPD, asthma, GERD, restless leg syndrome, depression and is admitted for E coli bacteremia with urinary source and septic encephalopathy. Clinically improving. Medically ready for discharge, awaiting TCU placement.      E coli bacteremia   Sepsis due to E coli UTI with end organ dysfunction of FLO, FLO now resolved  Septic encephalopathy, improving  H/o mild cognitive impairment  Presented to ED for confusion. Increased urinary frequency but ROS otherwise unrevealing. History of frequent UTIs, history of ESBL E coli. Per her sister, she often gets very confused when she gets them.  Vitals initially stable. Overnight 3/21, became septic with fever, tachycardia, hypoxia. UA suggestive of infection. Urine culture with gram negative bacilli. Blood culture with E coli (1 of 2). Clinical picture consistent with E coli bacteremia with urinary source and septic encephalopathy. Clinically improving.   - Antibiotics:               - PO Bactrim 3/26 - 3/31. Plan for 10 day total course.  - S/p meropenem 3/21 - 3/23  - S/p ceftriaxone 3/23-3/25  - Discontinued daily labs as they have been stable     Septic encephalopathy, improving  Delirium, improving  Mild cognitive impairment at baseline  Septic encephalopathy   History of mild cognitive impairment. Lives at home with son. Developed agitation overnight 3/23, treated with ativan and haldol, then was very somnolent on 3/24. No further agitation. Mental status much improved 3/25 - alert and oriented to person, place, and situation (not time).  SLUMS on 3/25 was 19.   - Continue to monitor   - Delirium precautions              - Avoid overnight interruptions as able by clustering cares               - Keep awake during  day time to optimize sleep at night.     FLO  Likely 2/2 dehydration  - push PO intake    Goals of care   Per son Juancarlos, patient is full code. Would want all medical care including pressors, ICU, intubation, resuscitation.   - Full code     Discharge planning  Mild cognitive impairment reported at baseline. Lives with son and his girlfriend. Does not get additional help at home. PT recommending TCU. Patient adamantly declines TCU and wants to discharge home. OT completed SLUMS on 3/25 which was 19. Based on this, patient cannot make her own medical decisions. Discussed with son who agrees with recommendation for TCU.   - PT/OT/CM              - Son looking at TCU options  - Consider home eval  - Consider walker/cane at discharge     History of COPD, Asthma on home oxygen  History of COPD on Spiriva and PRN albuterol. Has been out of home inhalers. Is supposed to be on 1-2L of oxygen at home but per son she rarely uses it. Here, patient hypoxic requiring 1-2L. Intermittent wheezing that responds well to neb.   - LAMA (incruse) daily   - Duonebs QID  - Oxygen PRN  - home O2 eval        Diet: Combination Diet Regular Diet Adult  Snacks/Supplements Adult: Ensure Enlive; Between Meals    DVT Prophylaxis: Enoxaparin (Lovenox) SQ  Muniz Catheter: Not present  Fluids: po  Lines: None     Cardiac Monitoring: None  Code Status: Full Code      Clinically Significant Risk Factors                               # Severe Malnutrition: based on nutrition assessment and treatment provided per dietitian's recommendations.    # Asthma: noted on problem list        Social Drivers of Health   Housing Stability: Low Risk  (3/23/2025)    Housing Stability     Do you have housing? : Yes     Are you worried about losing your housing?: No   Recent Concern: Housing Stability - High Risk (3/21/2025)    Housing Stability     Do you have housing? : No   Tobacco Use: Medium Risk (10/11/2024)    [facility] [employer] [facility]    Patient History     Smoking Tobacco Use: Former     Smokeless Tobacco Use: Never         Disposition Plan     Medically Ready for Discharge: Anticipated Tomorrow       The patient's care was discussed with the Attending Physician, Dr. Rosenstein .    Ray Mccauley MD  Hospitalist Service  [facility]  Securely message with [employer] (more info)  Text page via [employer] Paging/Directory   ______________________________________________________________________    Interval History     ROSALBA      Physical Exam   Vital Signs: Temp: 97.9  F (36.6  C) Temp src: Oral BP: 108/53 Pulse: 86   Resp: 19 SpO2: 93 % O2 Device: Nasal cannula Oxygen Delivery: 1 LPM  Weight: 133 lbs 6.05 oz    GENERAL: lying in bed, resting comfortably,  no distress  RESP: lungs clear, speaking in full sentences, normal work of breathing   CV: RRR, extremities well perfused  MS: no gross musculoskeletal defects noted  PSYCH: affect normal/bright       Data     I have personally reviewed the following data over the past 24 hrs:    N/A  \   N/A   / 312     N/A N/A N/A /  N/A   N/A N/A 1.33 (H) \       Imaging results reviewed over the past 24 hrs:   No results found for this or any previous visit (from the past 24 hours).

## 2025-03-30 NOTE — PLAN OF CARE
Problem: Adult Inpatient Plan of Care  Goal: Absence of Hospital-Acquired Illness or Injury  Intervention: Identify and Manage Fall Risk  Recent Flowsheet Documentation  Taken 3/29/2025 1710 by Kisha Hernandez RN  Safety Promotion/Fall Prevention:   activity supervised   assistive device/personal items within reach   clutter free environment maintained   nonskid shoes/slippers when out of bed   safety round/check completed     Problem: Delirium  Goal: Improved Attention and Thought Clarity  Intervention: Maximize Cognitive Function  Recent Flowsheet Documentation  Taken 3/29/2025 1710 by Kisha Hernandez RN  Sensory Stimulation Regulation: care clustered  Reorientation Measures: clock in view     Problem: Delirium  Goal: Improved Sleep  Outcome: Progressing     Problem: Gas Exchange Impaired  Goal: Optimal Gas Exchange  Intervention: Optimize Oxygenation and Ventilation  Recent Flowsheet Documentation  Taken 3/29/2025 1710 by Kisha Hernandez RN  Head of Bed (HOB) Positioning: HOB at 20-30 degrees   Goal Outcome Evaluation:         Pt admitted for E coli bacteremia with urinary source and septic encephalopathy  A/Ox4  VSS ex tachycardia. On 1L O2 NC. Patient occasionally removes NC  Denies pain  Up SBA  Regular diet, encouraged fluids  No PIV access, ok with leave out per MD order  Up to BR, voiding adequately this shift.   Nursing to continue to monitor.

## 2025-03-30 NOTE — PROGRESS NOTES
RCAT Treatment Plan    Patient Score: 5  Patient Acuity: 5    Clinical Indication for Therapy: history of bronchospasm and history of mucous producing disease    Therapy Ordered: Duoneb and FV    Assessment Summary: Patient's respiratory status improving. Remains on 1 LPM nasal cannula. BS clear, has non-productive cough. Per RCAT protocol will change scheduled nebulizers to PRN.   *Patient often declines nebulizers, no complaints of shortness of breath    Leda Ordaz, RT  3/30/2025

## 2025-03-30 NOTE — PLAN OF CARE
Problem: Delirium  Goal: Improved Behavioral Control  3/30/2025 1152 by Yael Sanchez, RN  Outcome: Progressing  3/30/2025 1152 by Yael Sanchez RN  Outcome: Progressing  Intervention: Minimize Safety Risk  Recent Flowsheet Documentation  Taken 3/30/2025 0930 by Yael Sanchez, RN  Enhanced Safety Measures:   room near unit station   patient/family teach back on injury risk     Problem: Gas Exchange Impaired  Goal: Optimal Gas Exchange  3/30/2025 1152 by Yael Sanchez, RN  Outcome: Progressing  3/30/2025 1152 by Yael Sanchez RN  Outcome: Progressing   Goal Outcome Evaluation:         A/O, forgetful.   C/o restless legs this morning, improved during the morning per patient report.     Had one incontinent BM this morning. Provider notified and ordered one time imodium per patient request, given per orders.  Up to the bathroom with SBA/assist x 1, walker and gait belt.  Bed alarm on for safety.     Poor appetite, did not want to have breakfast this morning. Encouraged to take po/fluids.

## 2025-03-30 NOTE — PLAN OF CARE
Problem: Delirium  Goal: Optimal Coping  Outcome: Progressing     Problem: Infection  Goal: Absence of Infection Signs and Symptoms  Outcome: Progressing     Problem: Gas Exchange Impaired  Goal: Optimal Gas Exchange  Outcome: Progressing  Intervention: Optimize Oxygenation and Ventilation  Recent Flowsheet Documentation  Taken 3/30/2025 0000 by Jannette Chang RN  Head of Bed (HOB) Positioning: HOB at 20-30 degrees   Goal Outcome Evaluation:       Alert and oriented    Vss on 1L O2 NC   Slept well overnight.

## 2025-03-30 NOTE — PROGRESS NOTES
Care Management Follow Up    Length of Stay (days): 9    Expected Discharge Date: 03/31/2025    Anticipated Discharge Plan:  Transitional Care    Transportation: TBD    PT Recommendations: Transitional Care Facility  OT Recommendations:  Transitional Care Facility     Barriers to Discharge: placement    Prior Living Situation: house with child(reba), adult    Discussed  Partnership in Safe Discharge Planning  document with patient/family: No     Handoff Completed: No, handoff not indicated or clinically appropriate    Patient/Spokesperson Updated: Yes. Who? Juancarlos Quintero    Additional Information:  RNCM called pt's sonJuancarlos to follow up on additional TCU choices to submit referrals. Son provide 3 additional TCUs. Referrals sent.     Pt is medically ready and can discharge once TCU has been secured.     Next Steps: Follow up on TCUs    Abiola Escobedo RN

## 2025-03-31 ENCOUNTER — APPOINTMENT (OUTPATIENT)
Dept: OCCUPATIONAL THERAPY | Facility: HOSPITAL | Age: 75
End: 2025-03-31
Payer: COMMERCIAL

## 2025-03-31 ENCOUNTER — APPOINTMENT (OUTPATIENT)
Dept: PHYSICAL THERAPY | Facility: HOSPITAL | Age: 75
End: 2025-03-31
Payer: COMMERCIAL

## 2025-03-31 LAB
CREAT SERPL-MCNC: 1.29 MG/DL (ref 0.51–0.95)
EGFRCR SERPLBLD CKD-EPI 2021: 44 ML/MIN/1.73M2

## 2025-03-31 PROCEDURE — 99232 SBSQ HOSP IP/OBS MODERATE 35: CPT | Mod: GC

## 2025-03-31 PROCEDURE — 250N000011 HC RX IP 250 OP 636

## 2025-03-31 PROCEDURE — 250N000013 HC RX MED GY IP 250 OP 250 PS 637

## 2025-03-31 PROCEDURE — 97116 GAIT TRAINING THERAPY: CPT | Mod: GP

## 2025-03-31 PROCEDURE — 97535 SELF CARE MNGMENT TRAINING: CPT | Mod: GO

## 2025-03-31 PROCEDURE — 97110 THERAPEUTIC EXERCISES: CPT | Mod: GP

## 2025-03-31 PROCEDURE — 36415 COLL VENOUS BLD VENIPUNCTURE: CPT

## 2025-03-31 PROCEDURE — 82565 ASSAY OF CREATININE: CPT

## 2025-03-31 PROCEDURE — 120N000001 HC R&B MED SURG/OB

## 2025-03-31 PROCEDURE — 250N000013 HC RX MED GY IP 250 OP 250 PS 637: Performed by: FAMILY MEDICINE

## 2025-03-31 RX ADMIN — ACETAMINOPHEN 650 MG: 325 TABLET ORAL at 00:14

## 2025-03-31 RX ADMIN — ACETAMINOPHEN 650 MG: 325 TABLET ORAL at 06:32

## 2025-03-31 RX ADMIN — ALBUTEROL SULFATE 2 PUFF: 90 AEROSOL, METERED RESPIRATORY (INHALATION) at 20:51

## 2025-03-31 RX ADMIN — Medication 1 MG: at 20:24

## 2025-03-31 RX ADMIN — ALBUTEROL SULFATE 2 PUFF: 90 AEROSOL, METERED RESPIRATORY (INHALATION) at 00:16

## 2025-03-31 RX ADMIN — ONDANSETRON 4 MG: 4 TABLET, ORALLY DISINTEGRATING ORAL at 00:14

## 2025-03-31 RX ADMIN — TRAZODONE HYDROCHLORIDE 100 MG: 50 TABLET ORAL at 20:24

## 2025-03-31 RX ADMIN — ROPINIROLE HYDROCHLORIDE 2 MG: 1 TABLET, FILM COATED ORAL at 20:52

## 2025-03-31 RX ADMIN — ENOXAPARIN SODIUM 40 MG: 40 INJECTION SUBCUTANEOUS at 20:24

## 2025-03-31 RX ADMIN — Medication 1 LOZENGE: at 20:24

## 2025-03-31 RX ADMIN — UMECLIDINIUM 1 PUFF: 62.5 AEROSOL, POWDER ORAL at 08:43

## 2025-03-31 RX ADMIN — PANTOPRAZOLE SODIUM 40 MG: 40 TABLET, DELAYED RELEASE ORAL at 06:32

## 2025-03-31 RX ADMIN — TRAZODONE HYDROCHLORIDE 100 MG: 50 TABLET ORAL at 00:14

## 2025-03-31 ASSESSMENT — ACTIVITIES OF DAILY LIVING (ADL)
ADLS_ACUITY_SCORE: 53

## 2025-03-31 NOTE — PLAN OF CARE
Problem: Adult Inpatient Plan of Care  Goal: Plan of Care Review  Description: The Plan of Care Review/Shift note should be completed every shift.  The Outcome Evaluation is a brief statement about your assessment that the patient is improving, declining, or no change.  This information will be displayed automatically on your shiftnote.  Outcome: Progressing   Goal Outcome Evaluation:     Patient intermittently forgetful.  Generalized weakness when up with SBA needed.  Bed and chair alarms continued for safety.    Patient denied pain throughout the day.      Waiting placement TCU.

## 2025-03-31 NOTE — PLAN OF CARE
Shift from 1900 to 0730-    Problem: Delirium  Goal: Optimal Coping  Outcome: Progressing  Goal: Improved Behavioral Control  Outcome: Progressing  Intervention: Minimize Safety Risk  Recent Flowsheet Documentation  Taken 3/31/2025 0032 by Laura Marvin RN  Enhanced Safety Measures: assistive devices when indicated  Trust Relationship/Rapport: care explained  Taken 3/30/2025 2012 by Laura Marvin RN  Enhanced Safety Measures: assistive devices when indicated  Trust Relationship/Rapport: care explained  Goal: Improved Attention and Thought Clarity  Outcome: Progressing  Intervention: Maximize Cognitive Function  Recent Flowsheet Documentation  Taken 3/31/2025 0032 by Laura Marvin RN  Sensory Stimulation Regulation: care clustered  Reorientation Measures: clock in view  Taken 3/30/2025 2012 by Laura Mravin RN  Sensory Stimulation Regulation: care clustered  Reorientation Measures: clock in view  Goal: Improved Sleep  Outcome: Progressing     Goal Outcome Evaluation:    Patient pleasant; Confused to situation only.     Watching TV most of the evening.   Had one BM. Up with SBA.     Around midnight, pt stated she was nauseated. Given zofran and nausea resolved.     Pt given melatonin earlier in the evening and still unable to sleep, then requested trazodone at midnight. Slept well.    Pt had headache around midnight and around 0630; Given tylenol twice. See reassessment.

## 2025-03-31 NOTE — PROGRESS NOTES
[facility]    Progress Note - Hospitalist Service       Date of Admission:  3/21/2025    Assessment & Plan   Vy Dooley is a 74 year old female admitted on 3/21/2025. She has history of frequent UTIs, mild cognitive impairment, COPD, asthma, GERD, restless leg syndrome, depression and is admitted for E coli bacteremia with urinary source and septic encephalopathy, now s/p antibiotic course and clinically improving. Medically ready for discharge, awaiting TCU placement.      E coli bacteremia   Sepsis due to E coli UTI with end organ dysfunction of FLO, FLO now resolved  Septic encephalopathy, improving  H/o mild cognitive impairment  Presented to ED for confusion. Increased urinary frequency but ROS otherwise unrevealing. History of frequent UTIs, history of ESBL E coli. Per her sister, she often gets very confused when she gets them.  Vitals initially stable. Overnight 3/21, became septic with fever, tachycardia, hypoxia. UA suggestive of infection. Urine culture with gram negative bacilli. Blood culture with E coli (1 of 2). Clinical picture consistent with E coli bacteremia with urinary source and septic encephalopathy. Clinically improving. S/p 10 -day antibiotic course (meropenem 3/21-3/23, ceftriaxone 3/23-3/25, Bactrim 3/26-3/30).   - s/p 10-day antibiotic course     Septic encephalopathy, resolved  Delirium, improving  Mild cognitive impairment at baseline  History of mild cognitive impairment. Lives at home with son. Developed agitation overnight 3/23, treated with ativan and haldol, then was very somnolent on 3/24. No further agitation. Mental status much improved since 3/25 - alert and oriented to person, place, and situation (not time).  SLUMS on 3/25 was 19.   - Continue to monitor   - Delirium precautions              - Avoid overnight interruptions as able by clustering cares               - Keep awake during day time to optimize sleep at night.     Bactrim-induced  creatinine elevation vs FLO,  improving   Likely Bactrim-induced creatinine elevation, not true FLO. Differential also includes dehydration, ATN, AIN. Cr baseline 0.8. Cr peak 1.33 on 3/30, now improved slightly 1.29 on 3/31.  - Encourage PO intake   - Discontinued Bactrim 3/30    Goals of care   Per son Juancarlos, patient is full code. Would want all medical care including pressors, ICU, intubation, resuscitation.   - Full code     Discharge planning  Mild cognitive impairment reported at baseline. Lives with son and his girlfriend. Does not get additional help at home. PT recommending TCU. Patient adamantly declines TCU and wants to discharge home. OT completed SLUMS on 3/25 which was 19. Based on this, patient cannot make her own medical decisions. Discussed with son who agrees with recommendation for TCU.   - PT/OT/CM              - Awaiting TCU placement      History of COPD, Asthma on home oxygen  History of COPD on Spiriva and PRN albuterol. Has been out of home inhalers. Is supposed to be on 1-2L of oxygen at home but per son she rarely uses it. Here, patient hypoxic requiring 1-2L. Intermittent wheezing that responds well to neb.   - LAMA (incruse) daily   - Duonebs QID  - Oxygen PRN, will need O2 at discharge (already has home setup)         Diet: Combination Diet Regular Diet Adult  Snacks/Supplements Adult: Ensure Enlive; Between Meals    DVT Prophylaxis: Enoxaparin (Lovenox) SQ  Muniz Catheter: Not present  Fluids: po  Lines: None     Cardiac Monitoring: None  Code Status: Full Code      Clinically Significant Risk Factors                               # Severe Malnutrition: based on nutrition assessment and treatment provided per dietitian's recommendations.    # Asthma: noted on problem list        Social Drivers of Health   Housing Stability: Low Risk  (3/23/2025)    Housing Stability     Do you have housing? : Yes     Are you worried about losing your housing?: No   Recent Concern: Housing  Stability - High Risk (3/21/2025)    Housing Stability     Do you have housing? : No   Tobacco Use: Medium Risk (10/11/2024)    Received from [employer]    Patient History     Smoking Tobacco Use: Former     Smokeless Tobacco Use: Never         Disposition Plan     Medically Ready for Discharge: Anticipated Tomorrow       The patient's care was discussed with the Attending Physician, Dr. Obregon .    Collette Landa MD  Hospitalist Service  [facility]  Securely message with [employer] (more info)  Text page via [employer] Paging/Directory   ______________________________________________________________________    Interval History     No acute events overnight. Asking about TCU updates. Continues to feel well.       Physical Exam   Vital Signs: Temp: 97.8  F (36.6  C) Temp src: Oral BP: 115/57 Pulse: 94   Resp: 16 SpO2: 92 % O2 Device: None (Room air)    Weight: 123 lbs 14.38 oz    GENERAL: sitting up in chair,  no distress  RESP: lungs clear, speaking in full sentences, normal work of breathing   CV: RRR, extremities well perfused  MS: no gross musculoskeletal defects noted  PSYCH: affect normal/bright       Data     I have personally reviewed the following data over the past 24 hrs:    N/A  \   N/A   / N/A     N/A N/A N/A /  N/A   N/A N/A 1.29 (H) \       Imaging results reviewed over the past 24 hrs:   No results found for this or any previous visit (from the past 24 hours).

## 2025-03-31 NOTE — PROGRESS NOTES
Care Management Follow Up    Length of Stay (days): 10    Expected Discharge Date: 04/01/2025    Anticipated Discharge Plan:  Transitional Care    Transportation: TBD    PT Recommendations: Transitional Care Facility, other (see comments)  OT Recommendations:  Transitional Care Facility     Barriers to Discharge: placement    Prior Living Situation: house with child(reba), adult    Advanced Directive on File:       Patient/Spokesperson Updated: Yes. Who? Pt    Additional Information:  SW working on finding TCU for Pt. SW met with Pt at bedside at Pt was requesting update. TCU referrals still pending.     Pt accepted at [facility] for tomorrow 4/1. SW needs to confirm with Pt and Pt's son if they are agreeable to [facility].    TREVOR ColindresW

## 2025-03-31 NOTE — PROGRESS NOTES
"CLINICAL NUTRITION SERVICES - REASSESSMENT NOTE     RECOMMENDATIONS FOR MDs/PROVIDERS TO ORDER:    Registered Dietitian Interventions:  No changes today continue Ensure Enlive 2PM snack    Future/Additional Recommendations:  F/U PO     INFORMATION OBTAINED  Pt states eating well, even \"too much\"  One Ensure unopened on tray table: pt reports drinking these, just not that specific one from yesterday    CURRENT NUTRITION ORDERS  Diet: Regular  Snacks/Supplements: Ensure Enlive-Ch 2PM    CURRENT INTAKE/TOLERANCE  Last 10 documented meals zero-100%, estimated average 85%, meals fair-to-good size per [employer] review, 2-3 meals /day     NEW FINDINGS  GI symptoms: WDL, last BM 3/30  Skin/wounds: Reviewed    Nutrition-relevant labs: Reviewed  Nutrition-relevant medications: Reviewed    Weight: standing VS bed not clarified, reassess w/future values  Date/Time Weight   03/30/25 1910 56.2 kg (123 lb 14.4 oz)   03/21/25 2102 60.5 kg (133 lb 6.1 oz)     ASSESSED NUTRITION NEEDS  Estimated Energy Needs: 1512+ kcals/day ( 25+kcal/kg )  Justification: Increased needs  Estimated Protein Needs: 61-72 grams protein/day (1 - 1.2 grams of pro/kg)  Justification: Increased needs  Estimated Fluid Needs: 1512+ mL/day (1 mL/kcal)  Justification: Maintenance    MALNUTRITION  Malnutrition Diagnosis: Severe malnutrition in the context of acute illness or injury  Malnutrition Present on Admission: Yes    EVALUATION OF THE PROGRESS TOWARD GOALS   Previous Goals  Patient to consume % of nutritionally adequate meal trays TID, or the equivalent with supplements/snacks.   Evaluation: Progressing    Previous Nutrition Diagnosis  Malnutrition (undernutrition) related to acute illness as evidenced by wt loss, loss of lean body mass   Evaluation: Improving    NUTRITION DIAGNOSIS  Malnutrition (undernutrition) related to acute illness as evidenced by wt loss, loss of lean body mass     INTERVENTIONS  No changes today    GOALS  Patient to " consume % of nutritionally adequate meal trays TID, or the equivalent with supplements/snacks.     MONITORING/EVALUATION  Progress toward goals will be monitored and evaluated per policy.

## 2025-04-01 ENCOUNTER — APPOINTMENT (OUTPATIENT)
Dept: OCCUPATIONAL THERAPY | Facility: HOSPITAL | Age: 75
End: 2025-04-01
Payer: COMMERCIAL

## 2025-04-01 VITALS
BODY MASS INDEX: 19.05 KG/M2 | TEMPERATURE: 98 F | OXYGEN SATURATION: 93 % | SYSTOLIC BLOOD PRESSURE: 117 MMHG | HEART RATE: 88 BPM | DIASTOLIC BLOOD PRESSURE: 55 MMHG | WEIGHT: 128.97 LBS | RESPIRATION RATE: 20 BRPM

## 2025-04-01 PROBLEM — R78.81 E. COLI BACTEREMIA: Status: ACTIVE | Noted: 2025-04-01

## 2025-04-01 PROBLEM — B96.20 E. COLI BACTEREMIA: Status: ACTIVE | Noted: 2025-04-01

## 2025-04-01 PROCEDURE — 250N000013 HC RX MED GY IP 250 OP 250 PS 637

## 2025-04-01 PROCEDURE — 97535 SELF CARE MNGMENT TRAINING: CPT | Mod: GO

## 2025-04-01 PROCEDURE — 99238 HOSP IP/OBS DSCHRG MGMT 30/<: CPT | Mod: GC

## 2025-04-01 PROCEDURE — 250N000013 HC RX MED GY IP 250 OP 250 PS 637: Performed by: FAMILY MEDICINE

## 2025-04-01 RX ORDER — ALBUTEROL SULFATE 90 UG/1
2 INHALANT RESPIRATORY (INHALATION) EVERY 6 HOURS PRN
Status: SHIPPED
Start: 2025-04-01

## 2025-04-01 RX ORDER — IPRATROPIUM BROMIDE AND ALBUTEROL SULFATE 2.5; .5 MG/3ML; MG/3ML
3 SOLUTION RESPIRATORY (INHALATION) EVERY 4 HOURS PRN
Status: SHIPPED
Start: 2025-04-01

## 2025-04-01 RX ORDER — ROPINIROLE 0.5 MG/1
2 TABLET, FILM COATED ORAL
Status: SHIPPED
Start: 2025-04-01 | End: 2025-04-10

## 2025-04-01 RX ORDER — TRAZODONE HYDROCHLORIDE 50 MG/1
100 TABLET ORAL
Status: SHIPPED
Start: 2025-04-01 | End: 2025-04-10

## 2025-04-01 RX ADMIN — ACETAMINOPHEN 650 MG: 325 TABLET ORAL at 09:53

## 2025-04-01 RX ADMIN — PANTOPRAZOLE SODIUM 40 MG: 40 TABLET, DELAYED RELEASE ORAL at 06:21

## 2025-04-01 RX ADMIN — UMECLIDINIUM 1 PUFF: 62.5 AEROSOL, POWDER ORAL at 09:53

## 2025-04-01 RX ADMIN — ACETAMINOPHEN 650 MG: 325 TABLET ORAL at 05:35

## 2025-04-01 RX ADMIN — ACETAMINOPHEN 650 MG: 325 TABLET ORAL at 05:43

## 2025-04-01 ASSESSMENT — ACTIVITIES OF DAILY LIVING (ADL)
ADLS_ACUITY_SCORE: 53

## 2025-04-01 NOTE — PLAN OF CARE
Patient transporting to TCU per family transport.  Packet will be sent with patient's son to give the TCU.  No significant changes in presentation.  Patient set to leave once family arrives at 2 pm.

## 2025-04-01 NOTE — DISCHARGE SUMMARY
[facility]  Discharge Summary - Medicine & Pediatrics       Date of Admission:  3/21/2025  Date of Discharge:  4/1/2025  Discharging Provider: Collette Landa MD   Discharge Service: Hospitalist Service    Discharge Diagnoses   Principal Problem:    Sepsis due to Escherichia coli with encephalopathy without septic shock (H)    Date Noted: 3/22/2025    E. coli bacteremia    Date Noted: 4/1/2025  Active Problems:    Chronic obstructive pulmonary disease (H)    Date Noted: 12/9/2015    Restless legs syndrome    Date Noted: 12/9/2015    Hypoxia    Date Noted: 3/21/2025    Acute encephalopathy    Date Noted: 3/21/2025    Urinary tract infection without hematuria, site unspecified    Date Noted: 3/21/2025    Clinically Significant Risk Factors     # Severe Malnutrition: based on nutrition assessment and treatment provided per dietitian's recommendations.      Follow-ups Needed After Discharge   Follow-up Appointments       Follow Up and recommended labs and tests      Follow-up with TCU physician. Recommend creatinine recheck within 1 week.              Unresulted Labs Ordered in the Past 30 Days of this Admission       No orders found from 2/19/2025 to 3/22/2025.        These results will be followed up by N/A    Discharge Disposition   Discharged to rehabilitation facility  Condition at discharge: Stable    Hospital Course   Vy Dooley is a 74 year old female admitted on 3/21/2025. She has history of frequent UTIs, mild cognitive impairment, COPD, asthma, GERD, restless leg syndrome, depression and is admitted for E coli bacteremia with urinary source and septic encephalopathy, now s/p antibiotic course and clinically improving. Discharging to TCU. The following problems were addressed during her hospitalization:     E coli bacteremia, resolved   Sepsis due to E coli UTI with end organ dysfunction of FLO, FLO now resolved  Presented to ED for confusion. Increased urinary frequency but ROS  otherwise unrevealing. History of frequent UTIs, history of ESBL E coli. History of mild cognitive impairment. Lives at home with son. Vitals initially stable. Overnight 3/21, became septic with fever, tachycardia, hypoxia, hypotension. UA suggestive of infection. Urine culture and blood culture with E coli. Clinical picture consistent with E coli bacteremia with urinary source and septic encephalopathy. Clinically improving. S/p 10-day antibiotic course (meropenem 3/21-3/23, ceftriaxone 3/23-3/25, Bactrim 3/26-3/30).   -PT/OT at TCU     Septic encephalopathy, resolved  Delirium, improving  Mild cognitive impairment at baseline  Presents with acute confusion, likely 2/2 septic encephalopathy. History of mild cognitive impairment. Lives at home with son. Developed agitation overnight 3/23, treated with ativan and haldol, then was very somnolent on 3/24. No further episodes of agitation. Mental status much improved since 3/25 - alert and oriented to person, place, and situation (not time).  SLUMS on 3/25 was 19.      Bactrim-induced creatinine elevation vs FLO,  improving   Likely Bactrim-induced creatinine elevation, not true FLO. Differential also includes dehydration, ATN, AIN. Cr baseline 0.8. Cr peak 1.33 on 3/30, now improved slightly 1.29 on 3/31.  - Encourage PO intake   - Follow-up creatinine in 1 week      Goals of care   Per patient and son Juancarlos, patient is full code. Would want all medical care including pressors, ICU, intubation, resuscitation.   - Full code     History of COPD  Chronic hypoxic respiratory failure 2/2 COPD, on home O2  History of COPD on Spiriva and PRN albuterol. Has been out of home inhalers. Is supposed to be on 1-2L of oxygen at home but per son she rarely uses it.While hospitalized, patient has been hypoxic requiring 1-2L. Intermittent wheezing that responds well to nebs.   - Incruse Ellipta daily   - Albuterol inhaler, DuoNebs PRN for shortness of breath, cough, wheezing  -  Home oxygen 2L     Insomnia   RLS  - PTA trazodone at bedtime PRN  - PTA ropinirole at bedtime PRN    Consultations This Hospital Stay   PHYSICAL THERAPY ADULT IP CONSULT  OCCUPATIONAL THERAPY ADULT IP CONSULT  CARE MANAGEMENT / SOCIAL WORK IP CONSULT  CARE MANAGEMENT / SOCIAL WORK IP CONSULT  PHYSICAL THERAPY ADULT IP CONSULT  OCCUPATIONAL THERAPY ADULT IP CONSULT    Code Status   Full Code       The patient was discussed with Dr. Sabas Landa MD  [address] [address]  Phone: [phone]  Fax: [phone]  ______________________________________________________________________    Physical Exam   Vital Signs: Temp: 98  F (36.7  C) Temp src: Oral BP: 117/55 Pulse: 88   Resp: 20 SpO2: 93 % O2 Device: Nasal cannula Oxygen Delivery: 2 LPM  Weight: 128 lbs 15.51 oz  GENERAL: sitting up in chair,  no distress  RESP: lungs clear, speaking in full sentences, normal work of breathing   CV: RRR, extremities well perfused  MS: no gross musculoskeletal defects noted  PSYCH: affect normal/bright       Primary Care Physician   [facility]    Discharge Orders      General info for SNF    Length of Stay Estimate: Short Term Care: Estimated # of Days <30  Condition at Discharge: Improving  Level of care:skilled   Rehabilitation Potential: Good  Admission H&P remains valid and up-to-date: Yes  Recent Chemotherapy: N/A  Use Nursing Home Standing Orders: Yes     Mantoux instructions    Give two-step Mantoux (PPD) Per Facility Policy Yes     Follow Up and recommended labs and tests    Follow-up with TCU physician. Recommend creatinine recheck within 1 week.     Reason for your hospital stay    You were hospitalized for sepsis caused by a urinary tract infection which led to bacteria in your bloodstream. You improved with antibiotics.     Activity - Up with nursing assistance     Full Code     Physical Therapy Adult Consult    Evaluate and treat as  clinically indicated.    Reason:  deconditioning from sepsis/hospitalization     Occupational Therapy Adult Consult    Evaluate and treat as clinically indicated.    Reason:  deconditioning from sepsis/hospitalization     Oxygen (SNF/TCU) Discharge     Diet    Follow this diet upon discharge:       Snacks/Supplements Adult: Ensure Enlive; Between Meals      Combination Diet Regular Diet Adult       Significant Results and Procedures   Most Recent 3 CBC's:  Recent Labs   Lab Test 03/30/25  0625 03/27/25  0631 03/25/25  0552 03/24/25  0718 03/23/25  0525   WBC  --   --  3.6* 4.7 7.7   HGB  --   --  10.2* 10.9* 11.5*   MCV  --   --  100 101* 100    242 179 173 147*     Most Recent 3 BMP's:  Recent Labs   Lab Test 03/31/25  0914 03/30/25  0625 03/28/25  1026 03/27/25  0631 03/25/25  0552 03/24/25  0718   NA  --   --  143  --  144 144   POTASSIUM  --   --  4.3  --  4.2 4.8   CHLORIDE  --   --  105  --  110* 108*   CO2  --   --  27  --  27 28   BUN  --   --  16.0  --  13.8 18.5   CR 1.29* 1.33* 1.14*   < > 0.84 0.84   ANIONGAP  --   --  11  --  7 8   KEELY  --   --  8.7*  --  8.1* 8.5*   GLC  --   --  111*  --  99 102*    < > = values in this interval not displayed.   ,   Results for orders placed or performed during the hospital encounter of 03/21/25   XR Chest Port 1 View    Narrative    EXAM: XR CHEST PORT 1 VIEW  LOCATION: [facility]  DATE: 3/21/2025    INDICATION: sepsis  COMPARISON: None.      Impression    IMPRESSION: Negative chest.   XR Chest Port 1 View    Narrative    EXAM: XR CHEST PORT 1 VIEW  LOCATION: [facility]  DATE: 3/21/2025    INDICATION: acute hypoxia  COMPARISON: None.      Impression    IMPRESSION: Negative chest.       Discharge Medications   Current Discharge Medication List        START taking these medications    Details   albuterol (PROAIR HFA/PROVENTIL HFA/VENTOLIN HFA) 108 (90 Base) MCG/ACT inhaler Inhale 2 puffs into the lungs every 6  hours as needed for wheezing.    Comments: Pharmacy may dispense brand covered by insurance (Proair, or proventil or ventolin or generic albuterol inhaler)  Associated Diagnoses: Chronic obstructive pulmonary disease, unspecified COPD type (H)      ipratropium - albuterol 0.5 mg/2.5 mg/3 mL (DUONEB) 0.5-2.5 (3) MG/3ML neb solution Take 1 vial (3 mLs) by nebulization every 4 hours as needed for wheezing.    Associated Diagnoses: Chronic obstructive pulmonary disease, unspecified COPD type (H)      umeclidinium (INCRUSE ELLIPTA) 62.5 MCG/ACT inhaler Inhale 1 puff into the lungs daily.    Associated Diagnoses: Chronic obstructive pulmonary disease, unspecified COPD type (H)           CONTINUE these medications which have CHANGED    Details   rOPINIRole (REQUIP) 0.5 MG tablet Take 4 tablets (2 mg) by mouth nightly as needed (restless leg syndrome).    Associated Diagnoses: Restless legs syndrome      traZODone (DESYREL) 50 MG tablet Take 2 tablets (100 mg) by mouth nightly as needed for sleep.    Associated Diagnoses: Insomnia due to other mental disorder           CONTINUE these medications which have NOT CHANGED    Details   omeprazole (PRILOSEC) 40 MG DR capsule Take 40 mg by mouth daily.      vibegron (GEMTESA) 75 MG TABS tablet Take 75 mg by mouth daily.           Allergies   Allergies   Allergen Reactions    Erythromycin Shortness Of Breath     Other Reaction(s): Not available    Amoxicillin      Other Reaction(s): Not available    Amoxicillin-Pot Clavulanate Itching

## 2025-04-01 NOTE — PLAN OF CARE
Occupational Therapy Discharge Summary    Reason for therapy discharge:    Discharged to transitional care facility.    Progress towards therapy goal(s). See goals on Care Plan in [facility] electronic health record for goal details.  Goals partially met.  Barriers to achieving goals:   discharge from facility.    Therapy recommendation(s):    Continued therapy is recommended.  Rationale/Recommendations:  To improve IND with ADLs and safety.

## 2025-04-01 NOTE — PLAN OF CARE
Problem: Adult Inpatient Plan of Care  Goal: Readiness for Transition of Care  Outcome: Progressing   Goal Outcome Evaluation:      Plan of Care Reviewed With: patient    Pt denies pain and has been walking independently in the room to use the bathroom. Pt denies pain and takes 100% of food with adequate fluids.

## 2025-04-01 NOTE — PLAN OF CARE
Problem: Infection  Goal: Absence of Infection Signs and Symptoms  Outcome: Progressing  Intervention: Prevent or Manage Infection  Recent Flowsheet Documentation  Taken 4/1/2025 0000 by Nirali Reed RN  Isolation Precautions: contact precautions maintained     Problem: Delirium  Goal: Improved Sleep  Outcome: Progressing   Goal Outcome Evaluation:    Patient is A/O x4 with intermittent forgetfulness and slight word finding difficulty. VSS on RA; CARUSO; C/o 6/10 HA pain, controlled with prn Tylenol x1;  sby assist w walker to toilet; no IV in place; pt slept most of NOC shift; requested ropinerol to be scheduled @ hs for restless legs; contact precautions maintained

## 2025-04-01 NOTE — PROGRESS NOTES
Care Management Discharge Note    Discharge Date: 04/01/2025       Discharge Disposition: Transitional Care    Discharge Services: None    Discharge DME: None    Discharge Transportation: family or friend will provide    Private pay costs discussed: Not applicable    Does the patient's insurance plan have a 3 day qualifying hospital stay waiver?  No    PAS Confirmation Code: [id number]  Patient/family educated on Medicare website which has current facility and service quality ratings: yes    Education Provided on the Discharge Plan: Yes  Persons Notified of Discharge Plans: Pt and son Jose  Patient/Family in Agreement with the Plan: yes    Handoff Referral Completed: No, handoff not indicated or clinically appropriate    Additional Information:  Pt to discharge to [facility] TCU today 4/1 at 2pm. Pt's son Jose will bring portable oxygen tank and transport Pt to TCU. PAS completed. SW confirmed discharge plan with Pt, Pt's son Jose, and TCU; all parties in agreement.     KAY Colindres

## 2025-04-01 NOTE — PLAN OF CARE
Physical Therapy Discharge Summary    Reason for therapy discharge:    Discharged to transitional care facility.    Progress towards therapy goal(s). See goals on Care Plan in [facility] electronic health record for goal details.  Goals partially met.  Barriers to achieving goals:   discharge from facility.    Therapy recommendation(s):    Continued therapy is recommended.  Rationale/Recommendations:  Continue PT at TCU as pt is progressing towards goals.

## 2025-04-02 ENCOUNTER — PATIENT OUTREACH (OUTPATIENT)
Dept: CARE COORDINATION | Facility: CLINIC | Age: 75
End: 2025-04-02

## 2025-04-02 ENCOUNTER — TRANSITIONAL CARE UNIT VISIT (OUTPATIENT)
Dept: GERIATRICS | Facility: CLINIC | Age: 75
End: 2025-04-02
Payer: COMMERCIAL

## 2025-04-02 ENCOUNTER — DOCUMENTATION ONLY (OUTPATIENT)
Dept: GERIATRICS | Facility: CLINIC | Age: 75
End: 2025-04-02

## 2025-04-02 NOTE — LETTER
4/2/2025      Vy Dooley  [address] [facility] [address]        [facility] GERIATRICS    PRIMARY CARE PROVIDER AND CLINIC:  [facility], [address] [facility] [address]***  Chief Complaint   Patient presents with    Hospital F/U      [place] Medical Record Number:  [id number]  Place of Service where encounter took place:  [facility] ([facility]) [[id number]]    Vy Dooley  is a 74 year old  (1950), {fgsinitialoption:732205}.   HPI:    ***    CODE STATUS/ADVANCE DIRECTIVES DISCUSSION:  Full Code  {CODE STATUS:890199}  ALLERGIES:   Allergies   Allergen Reactions    Erythromycin Shortness Of Breath     Other Reaction(s): Not available    Amoxicillin      Other Reaction(s): Not available    Amoxicillin-Pot Clavulanate Itching      PAST MEDICAL HISTORY:   Past Medical History:   Diagnosis Date    COPD (chronic obstructive pulmonary disease) (H)       PAST SURGICAL HISTORY:   has a past surgical history that includes Colonoscopy (9/26/2011); Eye surgery; Phacoemulsification with standard intraocular lens implant (Left, 12/18/2017); and Phacoemulsification with standard intraocular lens implant (Right, 1/22/2018).  FAMILY HISTORY: family history includes Colon Cancer in her father; Diabetes in her brother and sister; LUNG DISEASE in her mother.  SOCIAL HISTORY:   reports that she quit smoking about 10 years ago. Her smoking use included cigarettes. She does not have any smokeless tobacco history on file. She reports current alcohol use.  Patient's living condition: {LIVES WITH (NURSING HOME):081326}    Post Discharge Medication Reconciliation Status:   MED REC REQUIRED{TIP  Click the link below to document or use med rec list, use list to pull in response :155998}  Post Medication Reconciliation Status: {MED REC LIST:353957}       Current Outpatient Medications   Medication Sig Dispense Refill    albuterol (PROAIR HFA/PROVENTIL HFA/VENTOLIN HFA) 108  (90 Base) MCG/ACT inhaler Inhale 2 puffs into the lungs every 6 hours as needed for wheezing.      ipratropium - albuterol 0.5 mg/2.5 mg/3 mL (DUONEB) 0.5-2.5 (3) MG/3ML neb solution Take 1 vial (3 mLs) by nebulization every 4 hours as needed for wheezing.      omeprazole (PRILOSEC) 40 MG DR capsule Take 40 mg by mouth daily.      rOPINIRole (REQUIP) 0.5 MG tablet Take 4 tablets (2 mg) by mouth nightly as needed (restless leg syndrome).      traZODone (DESYREL) 50 MG tablet Take 2 tablets (100 mg) by mouth nightly as needed for sleep.      umeclidinium (INCRUSE ELLIPTA) 62.5 MCG/ACT inhaler Inhale 1 puff into the lungs daily.      vibegron (GEMTESA) 75 MG TABS tablet Take 75 mg by mouth daily.       No current facility-administered medications for this visit.       ROS:  {ROS FGS:419139}    Vitals:  There were no vitals taken for this visit.  Exam:  {residential physical exam :192659}    Lab/Diagnostic data:  {fgslab:800915}    ASSESSMENT/PLAN:    {FGS DX2:746703}    Orders:  {fgsorders:423069}  ***    Electronically signed by:  Mora King ***                     Sincerely,        Nabila Zarate, KINZA    Electronically signed

## 2025-04-02 NOTE — PROGRESS NOTES
[facility] GERIATRICS    PRIMARY CARE PROVIDER AND CLINIC:  [facility], [address] [facility] [address]***  Chief Complaint   Patient presents with    Hospital F/U      [place] Medical Record Number:  [id number]  Place of Service where encounter took place:  [facility] ([facility]) [[id number]]    Vy Dooley  is a 74 year old  (1950), admitted to the above facility from  [facility]. Hospital stay 3/21/25 through 4/1/25..   HPI:    ***    CODE STATUS/ADVANCE DIRECTIVES DISCUSSION:  Full Code  {CODE STATUS:881876}  ALLERGIES:   Allergies   Allergen Reactions    Erythromycin Shortness Of Breath     Other Reaction(s): Not available    Amoxicillin      Other Reaction(s): Not available    Amoxicillin-Pot Clavulanate Itching      PAST MEDICAL HISTORY:   Past Medical History:   Diagnosis Date    COPD (chronic obstructive pulmonary disease) (H)       PAST SURGICAL HISTORY:   has a past surgical history that includes Colonoscopy (9/26/2011); Eye surgery; Phacoemulsification with standard intraocular lens implant (Left, 12/18/2017); and Phacoemulsification with standard intraocular lens implant (Right, 1/22/2018).  FAMILY HISTORY: family history includes Colon Cancer in her father; Diabetes in her brother and sister; LUNG DISEASE in her mother.  SOCIAL HISTORY:   reports that she quit smoking about 10 years ago. Her smoking use included cigarettes. She does not have any smokeless tobacco history on file. She reports current alcohol use.  Patient's living condition: {LIVES WITH (NURSING HOME):337504}    Post Discharge Medication Reconciliation Status:   MED REC REQUIRED{TIP  Click the link below to document or use med rec list, use list to pull in response :675124}  Post Medication Reconciliation Status: {MED REC LIST:361672}       Current Outpatient Medications   Medication Sig Dispense Refill    albuterol (PROAIR HFA/PROVENTIL HFA/VENTOLIN HFA) 108  (90 Base) MCG/ACT inhaler Inhale 2 puffs into the lungs every 6 hours as needed for wheezing.      ipratropium - albuterol 0.5 mg/2.5 mg/3 mL (DUONEB) 0.5-2.5 (3) MG/3ML neb solution Take 1 vial (3 mLs) by nebulization every 4 hours as needed for wheezing.      omeprazole (PRILOSEC) 40 MG DR capsule Take 40 mg by mouth daily.      rOPINIRole (REQUIP) 0.5 MG tablet Take 4 tablets (2 mg) by mouth nightly as needed (restless leg syndrome).      traZODone (DESYREL) 50 MG tablet Take 2 tablets (100 mg) by mouth nightly as needed for sleep.      umeclidinium (INCRUSE ELLIPTA) 62.5 MCG/ACT inhaler Inhale 1 puff into the lungs daily.      vibegron (GEMTESA) 75 MG TABS tablet Take 75 mg by mouth daily.       No current facility-administered medications for this visit.       ROS:  {ROS FGS:840287}    Vitals:  There were no vitals taken for this visit.  Exam:  {FCI physical exam :207157}    Lab/Diagnostic data:  {fgslab:105107}    ASSESSMENT/PLAN:    {FGS DX2:271310}    Orders:  {fgsorders:666043}  ***    Electronically signed by:  Mora King ***

## 2025-04-02 NOTE — PROGRESS NOTES
Connected Care Resource Center: Connected [facility] [place]    Background: Transitional Care Management program identified per system criteria and reviewed by Connected Care Resource Center team for possible outreach.    Assessment: Upon chart review, CCRC Team member will not proceed with patient outreach related to this episode of Transitional Care Management program due to reason below:    Non-[facility] TCU: CCRC team member noted patient discharged to TCU/ARU/LTACH. Patient is not established with a [facility] currently supported by Primary Care-Care Coordination therefore handoff to Primary Care-Care Coordination is not appropriate at this time.    Plan: Transitional Care Management episode addressed appropriately per reason noted above.      TUAN Mcgraw  [facility], [facility]    *Connected Care Resource Team does NOT follow patient ongoing. Referrals are identified based on internal discharge reports and the outreach is to ensure patient has an understanding of their discharge instructions.

## 2025-04-04 PROBLEM — J45.50 SEVERE PERSISTENT ASTHMA WITHOUT COMPLICATION (H): Status: ACTIVE | Noted: 2025-04-04

## 2025-04-07 ENCOUNTER — LAB REQUISITION (OUTPATIENT)
Dept: LAB | Facility: CLINIC | Age: 75
End: 2025-04-07
Payer: COMMERCIAL

## 2025-04-07 ENCOUNTER — TELEPHONE (OUTPATIENT)
Dept: GERIATRICS | Facility: CLINIC | Age: 75
End: 2025-04-07
Payer: COMMERCIAL

## 2025-04-07 DIAGNOSIS — D64.9 ANEMIA, UNSPECIFIED: ICD-10-CM

## 2025-04-07 NOTE — TELEPHONE ENCOUNTER
[facility] Geriatric[facility] Triage Nurse Telephone Encounter    Provider: SHEREE Isaac CNP   Facility: [facility] Facility Type:  TCU    Caller: Charbel  Call Back Number: [phone]    Allergies:    Allergies   Allergen Reactions    Erythromycin Shortness Of Breath     Other Reaction(s): Not available    Amoxicillin      Other Reaction(s): Not available    Amoxicillin-Pot Clavulanate Itching        Reason for call: Nurse is calling to report that patient had a fall this AM when she reported getting dizzy in the bathroom.  She did bump her head slightly.  On-call provider was notified.  Patient is not on anticoagulation.  Orthostatic BP's this AM:  lying-96/59, sitting-96/58, standing-90/56.  Orthostatic BP's this afternoon:  lying-115/63, sitting-99/56, standing-88/43.  Patient denied dizziness during orthostatic VS checks.      Verbal Order/Direction given by Provider: Encourage fluids.  Check CBC and BMP on 4/8/25.  Provider to see patient tomorrow.      Provider giving Order:  SHEREE Isaac CNP     Verbal Order given to: Maine Malave RN

## 2025-04-07 NOTE — TELEPHONE ENCOUNTER
Resident had a fall this morning unwitnessed.  She got herself up and bumped her head in process.  Not other injuries noted.    Update regular provider if issues.    SHEREE Mancia CNP

## 2025-04-08 ENCOUNTER — LAB REQUISITION (OUTPATIENT)
Dept: LAB | Facility: CLINIC | Age: 75
End: 2025-04-08
Payer: COMMERCIAL

## 2025-04-08 ENCOUNTER — TRANSITIONAL CARE UNIT VISIT (OUTPATIENT)
Dept: GERIATRICS | Facility: CLINIC | Age: 75
End: 2025-04-08
Payer: COMMERCIAL

## 2025-04-08 ENCOUNTER — TELEPHONE (OUTPATIENT)
Dept: GERIATRICS | Facility: CLINIC | Age: 75
End: 2025-04-08

## 2025-04-08 VITALS
DIASTOLIC BLOOD PRESSURE: 58 MMHG | OXYGEN SATURATION: 95 % | TEMPERATURE: 98 F | RESPIRATION RATE: 18 BRPM | WEIGHT: 134 LBS | SYSTOLIC BLOOD PRESSURE: 108 MMHG | HEART RATE: 67 BPM | BODY MASS INDEX: 19.85 KG/M2 | HEIGHT: 69 IN

## 2025-04-08 DIAGNOSIS — J44.9 CHRONIC OBSTRUCTIVE PULMONARY DISEASE, UNSPECIFIED COPD TYPE (H): Chronic | ICD-10-CM

## 2025-04-08 DIAGNOSIS — D64.9 ANEMIA, UNSPECIFIED: ICD-10-CM

## 2025-04-08 DIAGNOSIS — E87.5 HYPERKALEMIA: ICD-10-CM

## 2025-04-08 DIAGNOSIS — J96.11 CHRONIC RESPIRATORY FAILURE WITH HYPOXIA (H): ICD-10-CM

## 2025-04-08 DIAGNOSIS — R53.81 PHYSICAL DECONDITIONING: Primary | ICD-10-CM

## 2025-04-08 DIAGNOSIS — I95.1 ORTHOSTATIC HYPOTENSION: ICD-10-CM

## 2025-04-08 DIAGNOSIS — G93.40 ACUTE ENCEPHALOPATHY: ICD-10-CM

## 2025-04-08 LAB
ANION GAP SERPL CALCULATED.3IONS-SCNC: 13 MMOL/L (ref 7–15)
BUN SERPL-MCNC: 28.2 MG/DL (ref 8–23)
CALCIUM SERPL-MCNC: 9.4 MG/DL (ref 8.8–10.4)
CHLORIDE SERPL-SCNC: 105 MMOL/L (ref 98–107)
CREAT SERPL-MCNC: 0.97 MG/DL (ref 0.51–0.95)
EGFRCR SERPLBLD CKD-EPI 2021: 61 ML/MIN/1.73M2
ERYTHROCYTE [DISTWIDTH] IN BLOOD BY AUTOMATED COUNT: 13.4 % (ref 10–15)
GLUCOSE SERPL-MCNC: 107 MG/DL (ref 70–99)
HCO3 SERPL-SCNC: 22 MMOL/L (ref 22–29)
HCT VFR BLD AUTO: 40.1 % (ref 35–47)
HGB BLD-MCNC: 12.5 G/DL (ref 11.7–15.7)
MCH RBC QN AUTO: 32.1 PG (ref 26.5–33)
MCHC RBC AUTO-ENTMCNC: 31.2 G/DL (ref 31.5–36.5)
MCV RBC AUTO: 103 FL (ref 78–100)
PLATELET # BLD AUTO: 348 10E3/UL (ref 150–450)
POTASSIUM SERPL-SCNC: 5.4 MMOL/L (ref 3.4–5.3)
RBC # BLD AUTO: 3.89 10E6/UL (ref 3.8–5.2)
SODIUM SERPL-SCNC: 140 MMOL/L (ref 135–145)
WBC # BLD AUTO: 4.7 10E3/UL (ref 4–11)

## 2025-04-08 PROCEDURE — 36415 COLL VENOUS BLD VENIPUNCTURE: CPT | Mod: ORL | Performed by: INTERNAL MEDICINE

## 2025-04-08 PROCEDURE — P9604 ONE-WAY ALLOW PRORATED TRIP: HCPCS | Mod: ORL | Performed by: INTERNAL MEDICINE

## 2025-04-08 PROCEDURE — 80048 BASIC METABOLIC PNL TOTAL CA: CPT | Performed by: INTERNAL MEDICINE

## 2025-04-08 PROCEDURE — 85027 COMPLETE CBC AUTOMATED: CPT | Mod: ORL | Performed by: INTERNAL MEDICINE

## 2025-04-08 PROCEDURE — 85014 HEMATOCRIT: CPT | Performed by: INTERNAL MEDICINE

## 2025-04-08 PROCEDURE — 80048 BASIC METABOLIC PNL TOTAL CA: CPT | Mod: ORL | Performed by: INTERNAL MEDICINE

## 2025-04-08 PROCEDURE — 99316 NF DSCHRG MGMT 30 MIN+: CPT | Performed by: NURSE PRACTITIONER

## 2025-04-08 NOTE — TELEPHONE ENCOUNTER
[facility] Geriatrics Lab Note     Provider: SHEREE Isaac CNP   Facility: [facility] Facility Type:  TCU    Allergies   Allergen Reactions    Erythromycin Shortness Of Breath     Other Reaction(s): Not available    Amoxicillin      Other Reaction(s): Not available    Amoxicillin-Pot Clavulanate Itching       Labs Reviewed by provider: Heme 2, BMP       Patient is discharging tomorrow.        Verbal Order/Direction given by Provider: TCU staff to make patient an appointment with her PCP before she discharges from the TCU.  Check potassium with home RN at first visit.  Provider already signed orders at the facility today.      Provider giving Order:  SHEREE Isaac CNP     Verbal Order given to: Maine Maalve RN

## 2025-04-08 NOTE — PROGRESS NOTES
[facility] GERIATRICS DISCHARGE SUMMARY  PATIENT'S NAME: Vy Dooley  YOB: 1950  MEDICAL RECORD NUMBER:  [id number]  Place of Service where encounter took place:  [facility] ([facility]) [[id number]]    PRIMARY CARE PROVIDER AND CLINIC RESPONSIBLE AFTER TRANSFER:   [facility], [address] [facility] [address]    Non-FMG Provider     Transferring providers: SHEREE Steven CNP  Recent Hospitalization/ED:  [facility] Hospital stay 3/21/24 to 4/1/25.  Date of SNF Admission: April 01, 2025  Date of SNF (anticipated) Discharge: Insurance/patient driven April 09, 2025  Discharged to: previous independent home with son  Cognitive Scores: MOCA: 17/30  Physical Function: Ambulating 160 ft with walker  DME: No new DME needed    CODE STATUS/ADVANCE DIRECTIVES DISCUSSION:  Full Code   ALLERGIES: Erythromycin, Amoxicillin, and Amoxicillin-pot clavulanate    NURSING FACILITY COURSE   Medication Changes/Rationale:   none    Summary of nursing facility stay:   Vy Dooley is a 74 year old female (1950) who was admitted to this TCU following [facility] hospitalization 3/21/25 to 4/1/25. PMH includes COPD on oxygen, cognitive impairment, RLS, GERD, depression. She presented to the ED after several falls and with increased confusion. She was admitted with sepsis due to E coli UTI.   Insurance issued a last covered day, social work said patient planned to appeal. When provider went to meet with patient, she said she is not appealing and wants to go home.    Physical deconditioning  Improving. Patient feels she is ready to go home. Therapy would have preferred more time, but she is safe with her support at home    Acute encephalopathy  Resolved. Patient is likely at baseline cognition, which is moderate impairment    Orthostatic hypotension  Patient had a fall in the bathroom yesterday morning. She reported feeling dizzy and then her legs  gave out. Later in the day staff checked orthostatic BP readings and she did have a decrease in BP with standing. In discussing this with her today, she does acknowledge feeling dizzy at times at home. She is very vague about how often this happens, but says not that often. Even before provider educates her, she says she needs to remember to move more slowly. Provider urges her to tell her family and her doctor whenever this happens.  ?trazodone reduction    Hyperkalemia  Mild on labs today. This as well as hypotension could be due to mild dehydration. Recommend pushing fluids, home care RN to draw K level at visit    Chronic respiratory failure with hypoxia (H)  Chronic obstructive pulmonary disease, unspecified COPD type (H)  No acute concerns. Has been wearing oxygen regularly      Discharge Medications:  Current Outpatient Medications   Medication Sig Dispense Refill    acetaminophen (TYLENOL) 650 MG CR tablet Take 1 tablet (650 mg) by mouth every 8 hours.      albuterol (PROAIR HFA/PROVENTIL HFA/VENTOLIN HFA) 108 (90 Base) MCG/ACT inhaler Inhale 2 puffs into the lungs every 6 hours as needed for wheezing.      ipratropium - albuterol 0.5 mg/2.5 mg/3 mL (DUONEB) 0.5-2.5 (3) MG/3ML neb solution Take 1 vial (3 mLs) by nebulization every 4 hours as needed for wheezing.      omeprazole (PRILOSEC) 40 MG DR capsule Take 40 mg by mouth daily.      rOPINIRole (REQUIP) 0.5 MG tablet Take 4 tablets (2 mg) by mouth nightly as needed (restless leg syndrome).      traZODone (DESYREL) 50 MG tablet Take 2 tablets (100 mg) by mouth nightly as needed for sleep.      umeclidinium (INCRUSE ELLIPTA) 62.5 MCG/ACT inhaler Inhale 1 puff into the lungs daily.      vibegron (GEMTESA) 75 MG TABS tablet Take 75 mg by mouth daily.          Controlled medications:   not applicable/none     Past Medical History:   Past Medical History:   Diagnosis Date    COPD (chronic obstructive pulmonary disease) (H)      Physical Exam:   Vitals: BP  "108/58   Pulse 67   Temp 98  F (36.7  C)   Resp 18   Ht 1.753 m (5' 9\")   Wt 60.8 kg (134 lb)   SpO2 95%   BMI 19.79 kg/m    BMI: Body mass index is 19.79 kg/m .  GENERAL APPEARANCE:  Alert, in no distress  EYES:  EOM normal, conjunctiva and lids normal  RESP:  no respiratory distress  CV:  no edema  PSYCH:  memory impaired      SNF labs: Labs done in SNF are in [place] EPIC. Please refer to them using [employer]/Care Everywhere.    DISCHARGE PLAN:  Follow up labs: K level to be drawn by home care with results to PCP  Medical Follow Up:      Follow up with primary care provider in 1-2 weeks  Discharge Services: Home Care:  Occupational Therapy, Physical Therapy, Registered Nurse, and Home Health Aide      TOTAL DISCHARGE TIME:   Greater than 30 minutes  Electronically signed by:  SHEREE Steven CNP       Documentation of Face to Face and Certification for Home Health Services    I certify that services are/were furnished while this patient was under the care of a physician and that a physician or an allowed non-physician practitioner (NPP), had a face-to-face encounter that meets the physician face-to-face encounter requirements. The encounter was in whole, or in part, related to the primary reason for home health. The patient is confined to his/her home and needs intermittent skilled nursing, physical therapy, speech-language pathology, or the continued need for occupational therapy. A plan of care has been established by a physician and is periodically reviewed by a physician.  Date of Face-to-Face Encounter: 4/8/2025.    I certify that, based on my findings, the following services are medically necessary home health services: Nursing, Occupational Therapy, and Physical Therapy.    My clinical findings support the need for the above skilled services because: Requires assistance of another person or specialized equipment to access medical services because patient: Is prone to wander/get lost without " assistance...    Patient to re-establish plan of care with their PCP within 7-10 days after leaving the facility to reestablish care.  Medicare certified PECOS provider: SHEREE Steven CNP  Date: April 8, 2025

## 2025-04-08 NOTE — LETTER
4/8/2025      Vy Dooley  [address] [facility] [address]        [facility] GERIATRICS DISCHARGE SUMMARY  PATIENT'S NAME: Vy Dooley  YOB: 1950  MEDICAL RECORD NUMBER:  [id number]  Place of Service where encounter took place:  [facility] ([facility]) [[id number]]    PRIMARY CARE PROVIDER AND CLINIC RESPONSIBLE AFTER TRANSFER:   [facility], [address] [facility] [address]    Non-FMG Provider     Transferring providers: SHEREE Steven CNP  Recent Hospitalization/ED:  [facility] Hospital stay 3/21/24 to 4/1/25.  Date of SNF Admission: April 01, 2025  Date of SNF (anticipated) Discharge: Insurance/patient driven April 09, 2025  Discharged to: previous independent home with son  Cognitive Scores: MOCA: 17/30  Physical Function: Ambulating 160 ft with walker  DME: No new DME needed    CODE STATUS/ADVANCE DIRECTIVES DISCUSSION:  Full Code   ALLERGIES: Erythromycin, Amoxicillin, and Amoxicillin-pot clavulanate    NURSING FACILITY COURSE   Medication Changes/Rationale:   none    Summary of nursing facility stay:   Vy Dooley is a 74 year old female (1950) who was admitted to this TCU following [place] hospitalization 3/21/25 to 4/1/25. PMH includes COPD on oxygen, cognitive impairment, RLS, GERD, depression. She presented to the ED after several falls and with increased confusion. She was admitted with sepsis due to E coli UTI.   Insurance issued a last covered day, social work said patient planned to appeal. When provider went to meet with patient, she said she is not appealing and wants to go home.    Physical deconditioning  Improving. Patient feels she is ready to go home. Therapy would have preferred more time, but she is safe with her support at home    Acute encephalopathy  Resolved. Patient is likely at baseline cognition, which is moderate impairment    Orthostatic hypotension  Patient had a  fall in the bathroom yesterday morning. She reported feeling dizzy and then her legs gave out. Later in the day staff checked orthostatic BP readings and she did have a decrease in BP with standing. In discussing this with her today, she does acknowledge feeling dizzy at times at home. She is very vague about how often this happens, but says not that often. Even before provider educates her, she says she needs to remember to move more slowly. Provider urges her to tell her family and her doctor whenever this happens.  ?trazodone reduction    Hyperkalemia  Mild on labs today. This as well as hypotension could be due to mild dehydration. Recommend pushing fluids, home care RN to draw K level at visit    Chronic respiratory failure with hypoxia (H)  Chronic obstructive pulmonary disease, unspecified COPD type (H)  No acute concerns. Has been wearing oxygen regularly      Discharge Medications:  Current Outpatient Medications   Medication Sig Dispense Refill     acetaminophen (TYLENOL) 650 MG CR tablet Take 1 tablet (650 mg) by mouth every 8 hours.       albuterol (PROAIR HFA/PROVENTIL HFA/VENTOLIN HFA) 108 (90 Base) MCG/ACT inhaler Inhale 2 puffs into the lungs every 6 hours as needed for wheezing.       ipratropium - albuterol 0.5 mg/2.5 mg/3 mL (DUONEB) 0.5-2.5 (3) MG/3ML neb solution Take 1 vial (3 mLs) by nebulization every 4 hours as needed for wheezing.       omeprazole (PRILOSEC) 40 MG DR capsule Take 40 mg by mouth daily.       rOPINIRole (REQUIP) 0.5 MG tablet Take 4 tablets (2 mg) by mouth nightly as needed (restless leg syndrome).       traZODone (DESYREL) 50 MG tablet Take 2 tablets (100 mg) by mouth nightly as needed for sleep.       umeclidinium (INCRUSE ELLIPTA) 62.5 MCG/ACT inhaler Inhale 1 puff into the lungs daily.       vibegron (GEMTESA) 75 MG TABS tablet Take 75 mg by mouth daily.          Controlled medications:   not applicable/none     Past Medical History:   Past Medical History:   Diagnosis  "Date     COPD (chronic obstructive pulmonary disease) (H)      Physical Exam:   Vitals: /58   Pulse 67   Temp 98  F (36.7  C)   Resp 18   Ht 1.753 m (5' 9\")   Wt 60.8 kg (134 lb)   SpO2 95%   BMI 19.79 kg/m    BMI: Body mass index is 19.79 kg/m .  GENERAL APPEARANCE:  Alert, in no distress  EYES:  EOM normal, conjunctiva and lids normal  RESP:  no respiratory distress  CV:  no edema  PSYCH:  memory impaired      SNF labs: Labs done in SNF are in [place] EPIC. Please refer to them using [employer]/Care Everywhere.    DISCHARGE PLAN:  Follow up labs: K level to be drawn by home care with results to PCP  Medical Follow Up:      Follow up with primary care provider in 1-2 weeks  Discharge Services: Home Care:  Occupational Therapy, Physical Therapy, Registered Nurse, and Home Health Aide      TOTAL DISCHARGE TIME:   Greater than 30 minutes  Electronically signed by:  SHEREE Steven CNP       Documentation of Face to Face and Certification for Home Health Services    I certify that services are/were furnished while this patient was under the care of a physician and that a physician or an allowed non-physician practitioner (NPP), had a face-to-face encounter that meets the physician face-to-face encounter requirements. The encounter was in whole, or in part, related to the primary reason for home health. The patient is confined to his/her home and needs intermittent skilled nursing, physical therapy, speech-language pathology, or the continued need for occupational therapy. A plan of care has been established by a physician and is periodically reviewed by a physician.  Date of Face-to-Face Encounter: 4/8/2025.    I certify that, based on my findings, the following services are medically necessary home health services: Nursing, Occupational Therapy, and Physical Therapy.    My clinical findings support the need for the above skilled services because: Requires assistance of another person or specialized " equipment to access medical services because patient: Is prone to wander/get lost without assistance...    Patient to re-establish plan of care with their PCP within 7-10 days after leaving the facility to reestablish care.  Medicare certified PECOS provider: SHEREE Steven CNP  Date: April 8, 2025                  Sincerely,        SHEREE Steven CNP    Electronically signed

## 2025-04-10 DIAGNOSIS — G25.81 RESTLESS LEGS SYNDROME: Chronic | ICD-10-CM

## 2025-04-10 DIAGNOSIS — F99 INSOMNIA DUE TO OTHER MENTAL DISORDER: ICD-10-CM

## 2025-04-10 DIAGNOSIS — J44.9 CHRONIC OBSTRUCTIVE PULMONARY DISEASE, UNSPECIFIED COPD TYPE (H): Chronic | ICD-10-CM

## 2025-04-10 DIAGNOSIS — K21.9 GASTROESOPHAGEAL REFLUX DISEASE WITHOUT ESOPHAGITIS: Primary | Chronic | ICD-10-CM

## 2025-04-10 DIAGNOSIS — N32.81 OVERACTIVE BLADDER: ICD-10-CM

## 2025-04-10 DIAGNOSIS — F51.05 INSOMNIA DUE TO OTHER MENTAL DISORDER: ICD-10-CM

## 2025-04-10 RX ORDER — OMEPRAZOLE 40 MG/1
40 CAPSULE, DELAYED RELEASE ORAL DAILY
Qty: 30 CAPSULE | Refills: 0 | Status: SHIPPED | OUTPATIENT
Start: 2025-04-10

## 2025-04-10 RX ORDER — VIBEGRON 75 MG/1
75 TABLET, FILM COATED ORAL DAILY
Qty: 30 TABLET | Refills: 0 | Status: SHIPPED | OUTPATIENT
Start: 2025-04-10

## 2025-04-10 RX ORDER — ROPINIROLE 0.5 MG/1
2 TABLET, FILM COATED ORAL
Qty: 60 TABLET | Refills: 0 | Status: SHIPPED | OUTPATIENT
Start: 2025-04-10

## 2025-04-10 RX ORDER — TRAZODONE HYDROCHLORIDE 50 MG/1
50-100 TABLET ORAL
Qty: 30 TABLET | Refills: 0 | Status: SHIPPED | OUTPATIENT
Start: 2025-04-10

## 2025-08-21 ENCOUNTER — HOSPITAL ENCOUNTER (EMERGENCY)
Facility: CLINIC | Age: 75
Discharge: HOME | End: 2025-08-21
Attending: PHYSICIAN ASSISTANT
Payer: COMMERCIAL

## 2025-08-21 VITALS
RESPIRATION RATE: 16 BRPM | DIASTOLIC BLOOD PRESSURE: 66 MMHG | TEMPERATURE: 97.3 F | HEART RATE: 95 BPM | SYSTOLIC BLOOD PRESSURE: 99 MMHG | OXYGEN SATURATION: 94 %

## 2025-08-21 DIAGNOSIS — N39.0 URINARY TRACT INFECTION: Primary | ICD-10-CM

## 2025-08-21 LAB
ALBUMIN UR-MCNC: NEGATIVE MG/DL
APPEARANCE UR: ABNORMAL
BACTERIA #/AREA URNS HPF: ABNORMAL /HPF
BILIRUB UR QL STRIP: NEGATIVE
COLOR UR AUTO: ABNORMAL
GLUCOSE UR STRIP-MCNC: NEGATIVE MG/DL
HGB UR QL STRIP: NEGATIVE
HYALINE CASTS: 1 /LPF
KETONES UR STRIP-MCNC: NEGATIVE MG/DL
LEUKOCYTE ESTERASE UR QL STRIP: ABNORMAL
MUCOUS THREADS #/AREA URNS LPF: PRESENT /LPF
NITRATE UR QL: POSITIVE
PH UR STRIP: 6 [PH] (ref 5–7)
RBC URINE: 5 /HPF
SP GR UR STRIP: 1.02 (ref 1–1.03)
SQUAMOUS EPITHELIAL: <1 /HPF
UROBILINOGEN UR STRIP-MCNC: NORMAL MG/DL
WBC URINE: >182 /HPF

## 2025-08-21 PROCEDURE — G0463 HOSPITAL OUTPT CLINIC VISIT: HCPCS | Performed by: PHYSICIAN ASSISTANT

## 2025-08-21 PROCEDURE — 87186 SC STD MICRODIL/AGAR DIL: CPT | Performed by: PHYSICIAN ASSISTANT

## 2025-08-21 PROCEDURE — 99213 OFFICE O/P EST LOW 20 MIN: CPT | Performed by: PHYSICIAN ASSISTANT

## 2025-08-21 PROCEDURE — 81001 URINALYSIS AUTO W/SCOPE: CPT | Performed by: PHYSICIAN ASSISTANT

## 2025-08-21 RX ORDER — CIPROFLOXACIN 500 MG/1
500 TABLET, FILM COATED ORAL 2 TIMES DAILY
Qty: 14 TABLET | Refills: 0 | Status: SHIPPED | OUTPATIENT
Start: 2025-08-21 | End: 2025-08-24 | Stop reason: ALTCHOICE

## 2025-08-21 ASSESSMENT — ENCOUNTER SYMPTOMS
CONSTITUTIONAL NEGATIVE: 1
FREQUENCY: 1
DYSURIA: 0
FEVER: 0

## 2025-08-21 ASSESSMENT — ACTIVITIES OF DAILY LIVING (ADL): ADLS_ACUITY_SCORE: 57

## 2025-08-22 LAB — BACTERIA UR CULT: ABNORMAL

## 2025-08-23 ENCOUNTER — TELEPHONE (OUTPATIENT)
Dept: NURSING | Facility: CLINIC | Age: 75
End: 2025-08-23
Payer: COMMERCIAL

## 2025-08-23 DIAGNOSIS — N39.0 URINARY TRACT INFECTION: Primary | ICD-10-CM

## 2025-08-24 RX ORDER — CEFPODOXIME PROXETIL 100 MG/1
100 TABLET, FILM COATED ORAL 2 TIMES DAILY
Qty: 10 TABLET | Refills: 0 | Status: SHIPPED | OUTPATIENT
Start: 2025-08-24 | End: 2025-08-29

## (undated) DEVICE — SOL WATER IRRIG 1000ML BOTTLE 07139-09

## (undated) DEVICE — PREP PAD ALCOHOL 6818

## (undated) DEVICE — SOL NACL 0.9% IRRIG 1000ML BOTTLE 07138-09

## (undated) RX ORDER — PHENYLEPHRINE HYDROCHLORIDE 25 MG/ML
SOLUTION/ DROPS OPHTHALMIC
Status: DISPENSED
Start: 2017-12-18

## (undated) RX ORDER — CYCLOPENTOLATE HYDROCHLORIDE 10 MG/ML
SOLUTION/ DROPS OPHTHALMIC
Status: DISPENSED
Start: 2018-01-22

## (undated) RX ORDER — TROPICAMIDE 10 MG/ML
SOLUTION/ DROPS OPHTHALMIC
Status: DISPENSED
Start: 2018-01-22

## (undated) RX ORDER — PHENYLEPHRINE HYDROCHLORIDE 25 MG/ML
SOLUTION/ DROPS OPHTHALMIC
Status: DISPENSED
Start: 2018-01-22

## (undated) RX ORDER — CYCLOPENTOLATE HYDROCHLORIDE 10 MG/ML
SOLUTION/ DROPS OPHTHALMIC
Status: DISPENSED
Start: 2017-12-18

## (undated) RX ORDER — TROPICAMIDE 10 MG/ML
SOLUTION/ DROPS OPHTHALMIC
Status: DISPENSED
Start: 2017-12-18